# Patient Record
Sex: MALE | Race: WHITE | NOT HISPANIC OR LATINO | Employment: FULL TIME | URBAN - METROPOLITAN AREA
[De-identification: names, ages, dates, MRNs, and addresses within clinical notes are randomized per-mention and may not be internally consistent; named-entity substitution may affect disease eponyms.]

---

## 2018-03-05 ENCOUNTER — TELEPHONE (OUTPATIENT)
Dept: HISTORICAL DEPT | Facility: CLINIC | Age: 38
End: 2018-03-05

## 2018-03-12 PROBLEM — Z91.89 RISK FACTORS FOR OBSTRUCTIVE SLEEP APNEA: Status: ACTIVE | Noted: 2017-03-23

## 2018-03-12 PROBLEM — M19.90 ARTHRITIS: Status: ACTIVE | Noted: 2017-03-23

## 2018-03-12 PROBLEM — I10 ESSENTIAL HYPERTENSION: Status: ACTIVE | Noted: 2017-03-23

## 2018-03-12 PROBLEM — E88.810 METABOLIC SYNDROME: Status: ACTIVE | Noted: 2017-04-20

## 2018-03-12 PROBLEM — E78.2 MIXED HYPERLIPIDEMIA: Status: ACTIVE | Noted: 2017-04-20

## 2018-03-14 ENCOUNTER — OFFICE VISIT (OUTPATIENT)
Dept: FAMILY MEDICINE | Facility: CLINIC | Age: 38
End: 2018-03-14
Attending: FAMILY MEDICINE
Payer: COMMERCIAL

## 2018-03-14 VITALS
TEMPERATURE: 98.7 F | SYSTOLIC BLOOD PRESSURE: 130 MMHG | BODY MASS INDEX: 36.65 KG/M2 | OXYGEN SATURATION: 97 % | RESPIRATION RATE: 18 BRPM | HEART RATE: 75 BPM | DIASTOLIC BLOOD PRESSURE: 96 MMHG | HEIGHT: 70 IN | WEIGHT: 256 LBS

## 2018-03-14 DIAGNOSIS — I10 ESSENTIAL HYPERTENSION: Primary | ICD-10-CM

## 2018-03-14 DIAGNOSIS — E88.810 METABOLIC SYNDROME: ICD-10-CM

## 2018-03-14 DIAGNOSIS — J45.30 MILD PERSISTENT ASTHMATIC BRONCHITIS WITHOUT COMPLICATION: ICD-10-CM

## 2018-03-14 PROCEDURE — 99213 OFFICE O/P EST LOW 20 MIN: CPT | Performed by: FAMILY MEDICINE

## 2018-03-14 RX ORDER — OSELTAMIVIR PHOSPHATE 75 MG/1
75 CAPSULE ORAL
Refills: 0 | COMMUNITY
Start: 2018-01-22 | End: 2018-11-19 | Stop reason: ENTERED-IN-ERROR

## 2018-03-14 ASSESSMENT — ENCOUNTER SYMPTOMS
BLOOD IN STOOL: 0
FATIGUE: 0
WEAKNESS: 0
UNEXPECTED WEIGHT CHANGE: 0
APPETITE CHANGE: 0
ABDOMINAL PAIN: 0
CHEST TIGHTNESS: 0
PALPITATIONS: 0
EYE PAIN: 0
COUGH: 0
HEADACHES: 0
DYSURIA: 0
DIZZINESS: 0
DIARRHEA: 0
FLANK PAIN: 0
ACTIVITY CHANGE: 0
ARTHRALGIAS: 0
DIAPHORESIS: 0
CONSTIPATION: 0
SORE THROAT: 0
MYALGIAS: 0
ADENOPATHY: 0
NAUSEA: 0
FEVER: 0
SHORTNESS OF BREATH: 0
VOMITING: 0
EYE REDNESS: 0

## 2018-03-14 NOTE — PATIENT INSTRUCTIONS
Although  blood pressures are high today, he was out of medication for 7 days and just resumed last night.  Based on his regular home blood pressure monitoring I believe that the current Edarbychlor dosage is effective and will continue same, but advised patient that if diastolics creep up above 82 on a consistent basis we could add an additional blood pressure lowering agent to the current regimen.    Reviewed dietary guidelines and encourage additional weight loss.  Follow-up in 6 months if all goes well.  Proceed with comprehensive labs as ordered to assess triglycerides/HDL, metabolic syndrome, LFTs.    Body Mass index (BMI) is calculated from height and weight.  It is an easy method of screening for weight categories that may lead to health problems.  It is accurate for most people 20 years old and older.  The standard weight status categories are:    BMI Weight Category   Below 18.5 Underweight   18.5 - 24.9 Normal   25.0 - 29.9 Overweight   Above 30 Obese       An abnormal BMI can increase your risk for chronic illnesses.  If you already have one of these conditions, abnormal BMI can make them worse.  These conditions include: high blood pressure, diabetes, heart disease, sleep apnea, lung disease and certain cancers. Working toward a normal BMI can improve your overall health.     Your PCP can help you make a wellness plan to improve your BMI.  Eating the right foods - in the right amounts - and exercising regularly can help you achieve and maintain a normal BMI.  [facility] also offers specialized weight management services supervised by doctors.  These programs include nutrition counseling, the [facility] Program (a meal replacement program) and bariatric surgery.  Even if you don’t have a specific medical problem related to your BMI, [facility] offers free community education seminars and demonstrations in our hospitals, health centers and other convenient locations in your community.  You  can obtain more information about these programs by calling 4.717.HONP.[facility] ([phone]).    Please talk to your PCP about what treatment approach is right for you.

## 2018-03-14 NOTE — PROGRESS NOTES
"Subjective    Pt returns for BP check: outpt Bps are well controlled 120s/low 80s.(avg diastolic is 82).    He ran out of [employer] all of last week and only resumed it last night, so today's readings are up accordingly.  He remains on Fish oil 2 caps daily, low salt diet, weight is stable.    He remains on Fish oil due tpo metabolic syndrome  Patient ID: Andrew Thrasher is a 37 y.o. male.    HPI    The following have been reviewed and updated as appropriate in this visit:  Tobacco       Review of Systems   Constitutional: Negative for activity change, appetite change, diaphoresis, fatigue, fever and unexpected weight change.   HENT: Negative for congestion and sore throat.    Eyes: Negative for pain, redness and visual disturbance.   Respiratory: Negative for cough, chest tightness and shortness of breath.    Cardiovascular: Negative for chest pain and palpitations.   Gastrointestinal: Negative for abdominal pain, blood in stool, constipation, diarrhea, nausea and vomiting.   Genitourinary: Negative for dysuria and flank pain.   Musculoskeletal: Negative for arthralgias and myalgias.   Skin: Negative for rash.   Neurological: Negative for dizziness, weakness and headaches.   Hematological: Negative for adenopathy.   Psychiatric/Behavioral: Negative for behavioral problems.       Objective     Vitals:    03/14/18 1405 03/14/18 1419 03/14/18 1420   BP: 122/88 (!) 130/92 (!) 130/96   BP Location: Left forearm     Patient Position: Sitting     Pulse: 75     Resp: 18     Temp: 37.1 °C (98.7 °F)     TempSrc: Oral     SpO2: 97%     Weight: 116 kg (256 lb)     Height: 1.778 m (5' 10\")       Body mass index is 36.73 kg/m².    Physical Exam   Constitutional: He appears well-developed and well-nourished. No distress.   HENT:   Head: Normocephalic and atraumatic.   Mouth/Throat: No oropharyngeal exudate.   Otic Canals clear, Tympanic membranes intact  Eyes: HOSSEIN, EOMI, no conjunctival injection  Nose and throat clear, no " inflammation or exudate, no oral mucosal lesions  Neck supple, with intact range of motion, no mass, no lymphadenopathy, no thyromegaly, no thyroid nodule, no carotid bruit, no JVD.   Eyes: Conjunctivae and EOM are normal. Pupils are equal, round, and reactive to light.   Neck: Normal range of motion. Neck supple.   Cardiovascular: Normal rate, regular rhythm and normal heart sounds.  Exam reveals no gallop and no friction rub.    No murmur heard.  Pulmonary/Chest: Effort normal and breath sounds normal. No respiratory distress. He has no wheezes. He has no rales.   Abdominal: Soft. Bowel sounds are normal. There is no tenderness.   Musculoskeletal: He exhibits no edema.   Back no CVA tenderness    Legs: no edema, no lacy venous insufficiency changes   Lymphadenopathy:     He has no cervical adenopathy.   Skin: No rash noted.   Psychiatric: He has a normal mood and affect.       Assessment/Plan   Problem List Items Addressed This Visit     Essential hypertension - Primary      Other Visit Diagnoses     Mild persistent asthmatic bronchitis without complication

## 2018-04-18 LAB
ALBUMIN SERPL-MCNC: 4.2 G/DL (ref 3.6–5.1)
ALBUMIN/GLOB SERPL: 2.1 (CALC) (ref 1–2.5)
ALP SERPL-CCNC: 53 U/L (ref 40–115)
ALT SERPL-CCNC: 54 U/L (ref 9–46)
AST SERPL-CCNC: 22 U/L (ref 10–40)
BILIRUB SERPL-MCNC: 0.3 MG/DL (ref 0.2–1.2)
BUN SERPL-MCNC: 22 MG/DL (ref 7–25)
BUN/CREAT SERPL: ABNORMAL (CALC) (ref 6–22)
CALCIUM SERPL-MCNC: 8.8 MG/DL (ref 8.6–10.3)
CHLORIDE SERPL-SCNC: 108 MMOL/L (ref 98–110)
CHOLEST SERPL-MCNC: 197 MG/DL
CHOLEST/HDLC SERPL: 6.2 (CALC)
CO2 SERPL-SCNC: 29 MMOL/L (ref 20–31)
CREAT SERPL-MCNC: 1.07 MG/DL (ref 0.6–1.35)
GFR SERPL CREATININE-BSD FRML MDRD: 88 ML/MIN/1.73M2
GLOBULIN SER CALC-MCNC: 2 G/DL (CALC) (ref 1.9–3.7)
GLUCOSE SERPL-MCNC: 93 MG/DL (ref 65–99)
HDLC SERPL-MCNC: 32 MG/DL
LDLC SERPL CALC-MCNC: 128 MG/DL (CALC)
NONHDLC SERPL-MCNC: 165 MG/DL (CALC)
POTASSIUM SERPL-SCNC: 4.1 MMOL/L (ref 3.5–5.3)
PROT SERPL-MCNC: 6.2 G/DL (ref 6.1–8.1)
SODIUM SERPL-SCNC: 142 MMOL/L (ref 135–146)
TRIGL SERPL-MCNC: 231 MG/DL

## 2018-04-24 ENCOUNTER — TELEPHONE (OUTPATIENT)
Dept: FAMILY MEDICINE | Facility: CLINIC | Age: 38
End: 2018-04-24

## 2018-04-24 NOTE — TELEPHONE ENCOUNTER
Fwd to alcon    ----- Message from Benja Smith MD sent at 4/24/2018  7:54 AM EDT -----  Notify patient  (age 37) of lab results.  Normal glucose and kidney testing, liver enzymes have actually improved compared to last 2 lab studies with ALT down to 54 (normal is under 46 but previous readings were 81 year ago    Lipid levels are abnormal with high triglycerides at 231, borderline high LDL at 128, but dangerously low HDL at 32, less than 1 6 of the total cholesterol.  This increase increases long-term heart risk substantially.   Cut way down on starches/ in the diet (bread, pasta, pizza, p[otatoes, sweets) to get the triglycerides down and prevent future diabets.       The following measures should help bring the HDL up: Increased cardio exercise 3-6 sessions per week, increase intake of all of oil fish oil and avocado oil in the diet and consider adding 1 or 2 capsules of over-the-counter fish oil, 1000 mg, per day as a long-term supplement.  I would recommend he repeat the lipid panel in 3-6 months to see how these changes are working.

## 2018-10-15 ENCOUNTER — TRANSCRIBE ORDERS (OUTPATIENT)
Dept: SCHEDULING | Age: 38
End: 2018-10-15

## 2018-10-15 DIAGNOSIS — M51.26 OTHER INTERVERTEBRAL DISC DISPLACEMENT, LUMBAR REGION: Primary | ICD-10-CM

## 2018-10-17 ENCOUNTER — HOSPITAL ENCOUNTER (OUTPATIENT)
Dept: RADIOLOGY | Facility: HOSPITAL | Age: 38
Discharge: HOME | End: 2018-10-17
Attending: NURSE PRACTITIONER
Payer: COMMERCIAL

## 2018-10-17 DIAGNOSIS — M51.26 OTHER INTERVERTEBRAL DISC DISPLACEMENT, LUMBAR REGION: ICD-10-CM

## 2018-11-13 ENCOUNTER — TRANSCRIBE ORDERS (OUTPATIENT)
Dept: SCHEDULING | Age: 38
End: 2018-11-13

## 2018-11-13 DIAGNOSIS — M76.62 ACHILLES TENDINITIS OF LEFT LOWER EXTREMITY: Primary | ICD-10-CM

## 2018-11-14 ENCOUNTER — OFFICE VISIT (OUTPATIENT)
Dept: FAMILY MEDICINE | Facility: CLINIC | Age: 38
End: 2018-11-14
Payer: COMMERCIAL

## 2018-11-14 VITALS
HEIGHT: 70 IN | TEMPERATURE: 98.1 F | WEIGHT: 257 LBS | OXYGEN SATURATION: 97 % | SYSTOLIC BLOOD PRESSURE: 125 MMHG | BODY MASS INDEX: 36.79 KG/M2 | HEART RATE: 71 BPM | RESPIRATION RATE: 15 BRPM | DIASTOLIC BLOOD PRESSURE: 75 MMHG

## 2018-11-14 DIAGNOSIS — E88.810 METABOLIC SYNDROME: ICD-10-CM

## 2018-11-14 DIAGNOSIS — J45.30 MILD PERSISTENT ASTHMATIC BRONCHITIS WITHOUT COMPLICATION: ICD-10-CM

## 2018-11-14 DIAGNOSIS — I10 ESSENTIAL HYPERTENSION: Primary | ICD-10-CM

## 2018-11-14 PROBLEM — J45.909 ASTHMATIC BRONCHITIS: Status: ACTIVE | Noted: 2018-11-14

## 2018-11-14 PROCEDURE — 99214 OFFICE O/P EST MOD 30 MIN: CPT | Performed by: FAMILY MEDICINE

## 2018-11-14 RX ORDER — MONTELUKAST SODIUM 10 MG/1
10 TABLET ORAL NIGHTLY
Qty: 90 TABLET | Refills: 1 | Status: SHIPPED | OUTPATIENT
Start: 2018-11-14 | End: 2019-05-08 | Stop reason: SDUPTHER

## 2018-11-14 RX ORDER — ALBUTEROL SULFATE 90 UG/1
2 INHALANT RESPIRATORY (INHALATION) 3 TIMES DAILY PRN
Qty: 1 INHALER | Refills: 3 | Status: SHIPPED | OUTPATIENT
Start: 2018-11-14 | End: 2019-06-13 | Stop reason: SDUPTHER

## 2018-11-14 ASSESSMENT — ENCOUNTER SYMPTOMS
ADENOPATHY: 0
FEVER: 0
FATIGUE: 0
UNEXPECTED WEIGHT CHANGE: 0
HEADACHES: 0
VOMITING: 0
CHEST TIGHTNESS: 0
NAUSEA: 0
DYSURIA: 0
PALPITATIONS: 0
SHORTNESS OF BREATH: 0

## 2018-11-14 NOTE — PATIENT INSTRUCTIONS
Stay on same meds.    Labs soon.    Followup 6 months.    Essential hypertension  Excellent control: Continue current meds follow-up 6 months.    Asthmatic bronchitis  Stable on current management: Refilled meds.    Metabolic syndrome  We discussed his metabolic syndrome lab findings.  Stick with a low-carb diet, low-fat diet.  Reviewed guidelines in details with patient

## 2018-11-14 NOTE — PROGRESS NOTES
"Subjective      Home BPS remain well controlled  at 120s over 80- average.    He remains on a low carb diet.  Cut way down on bread/ starches.    Request an9cjfr asthma meds including prevciously unlisted Singul;air which he finds beneficial.  Escape inhaler use increases to several times weekly (not daily) in winter months.      Patient ID: Andrew Thrasher is a 38 y.o. male.  1980      HPI    The following have been reviewed and updated as appropriate in this visit:  Allergies  Meds  Problems       Review of Systems   Constitutional: Negative for fatigue, fever and unexpected weight change (NO UNEXPLAINED WEIGHT LOSS).   Respiratory: Negative for chest tightness and shortness of breath.    Cardiovascular: Negative for chest pain and palpitations.   Gastrointestinal: Negative for nausea and vomiting.   Genitourinary: Negative for dysuria.   Skin: Negative for rash.   Neurological: Negative for headaches.   Hematological: Negative for adenopathy.       Objective     Vitals:    11/14/18 0858 11/14/18 0917   BP: 120/80 125/75   BP Location: Left upper arm    Patient Position: Sitting    Pulse: 71    Resp: 15    Temp: 36.7 °C (98.1 °F)    SpO2: 97%    Weight: 117 kg (257 lb)    Height: 1.778 m (5' 10\")      Body mass index is 36.88 kg/m².    Physical Exam   Constitutional: He appears well-developed and well-nourished. No distress.   HENT:   Otic Canals clear, Tympanic membranes intact  Eyes: HOSSEIN, EOMI, no conjunctival injection  Nose and throat clear, no inflammation or exudate, no oral mucosal lesions  Neck supple, with intact range of motion, no mass, no lymphadenopathy, no thyromegaly, no thyroid nodule, no carotid bruit, no JVD.   Cardiovascular: Normal rate, regular rhythm and normal heart sounds.  Exam reveals no gallop and no friction rub.    No murmur heard.  Pulmonary/Chest: Effort normal and breath sounds normal. He has no wheezes. He has no rales.   Musculoskeletal: He exhibits no edema.   Back no " CVA tenderness    Legs: no edema, no lacy venous insufficiency changes   Skin: No rash noted.   Psychiatric: He has a normal mood and affect.       Assessment/Plan   Problem List Items Addressed This Visit     Essential hypertension - Primary     Excellent control: Continue current meds follow-up 6 months.         Relevant Medications    azilsartan med-chlorthalidone (EDARBYCLOR) 40-12.5 mg tablet    Metabolic syndrome     We discussed his metabolic syndrome lab findings.  Stick with a low-carb diet, low-fat diet.  Reviewed guidelines in details with patient         Relevant Orders    Lipid panel    ALT    Asthmatic bronchitis     Stable on current management: Refilled meds.         Relevant Medications    albuterol HFA (PROAIR HFA) 90 mcg/actuation inhaler    montelukast (SINGULAIR) 10 mg tablet

## 2018-11-15 NOTE — ASSESSMENT & PLAN NOTE
We discussed his metabolic syndrome lab findings.  Stick with a low-carb diet, low-fat diet.  Reviewed guidelines in details with patient

## 2018-11-19 ENCOUNTER — APPOINTMENT (OUTPATIENT)
Dept: LAB | Facility: HOSPITAL | Age: 38
End: 2018-11-19
Attending: ORTHOPAEDIC SURGERY
Payer: COMMERCIAL

## 2018-11-19 ENCOUNTER — APPOINTMENT (OUTPATIENT)
Dept: PREADMISSION TESTING | Facility: HOSPITAL | Age: 38
End: 2018-11-19
Attending: ORTHOPAEDIC SURGERY
Payer: COMMERCIAL

## 2018-11-19 ENCOUNTER — HOSPITAL ENCOUNTER (OUTPATIENT)
Dept: CARDIOLOGY | Facility: HOSPITAL | Age: 38
Discharge: HOME | End: 2018-11-19
Attending: ORTHOPAEDIC SURGERY
Payer: COMMERCIAL

## 2018-11-19 ENCOUNTER — TRANSCRIBE ORDERS (OUTPATIENT)
Dept: REGISTRATION | Facility: HOSPITAL | Age: 38
End: 2018-11-19

## 2018-11-19 ENCOUNTER — OFFICE VISIT (OUTPATIENT)
Dept: CARDIOLOGY | Facility: CLINIC | Age: 38
End: 2018-11-19
Payer: COMMERCIAL

## 2018-11-19 ENCOUNTER — HOSPITAL ENCOUNTER (OUTPATIENT)
Dept: RADIOLOGY | Facility: HOSPITAL | Age: 38
Discharge: HOME | End: 2018-11-19
Attending: ORTHOPAEDIC SURGERY
Payer: COMMERCIAL

## 2018-11-19 VITALS
HEART RATE: 75 BPM | DIASTOLIC BLOOD PRESSURE: 81 MMHG | BODY MASS INDEX: 34.27 KG/M2 | WEIGHT: 253 LBS | TEMPERATURE: 97.9 F | HEIGHT: 72 IN | SYSTOLIC BLOOD PRESSURE: 118 MMHG

## 2018-11-19 VITALS
BODY MASS INDEX: 34.27 KG/M2 | HEIGHT: 72 IN | DIASTOLIC BLOOD PRESSURE: 81 MMHG | WEIGHT: 253 LBS | HEART RATE: 75 BPM | SYSTOLIC BLOOD PRESSURE: 118 MMHG | OXYGEN SATURATION: 97 %

## 2018-11-19 DIAGNOSIS — M43.17 SPONDYLOLISTHESIS OF LUMBOSACRAL REGION: ICD-10-CM

## 2018-11-19 DIAGNOSIS — Z91.89 RISK FACTORS FOR OBSTRUCTIVE SLEEP APNEA: ICD-10-CM

## 2018-11-19 DIAGNOSIS — E88.810 METABOLIC SYNDROME: ICD-10-CM

## 2018-11-19 DIAGNOSIS — M54.16 RADICULOPATHY OF LUMBAR REGION: ICD-10-CM

## 2018-11-19 DIAGNOSIS — M47.26 OSTEOARTHRITIS OF SPINE WITH RADICULOPATHY, LUMBAR REGION: ICD-10-CM

## 2018-11-19 DIAGNOSIS — I10 ESSENTIAL HYPERTENSION: ICD-10-CM

## 2018-11-19 DIAGNOSIS — Z01.810 PREOP CARDIOVASCULAR EXAM: Primary | ICD-10-CM

## 2018-11-19 DIAGNOSIS — J45.20 MILD INTERMITTENT ASTHMATIC BRONCHITIS WITHOUT COMPLICATION: ICD-10-CM

## 2018-11-19 DIAGNOSIS — M43.17 SPONDYLOLISTHESIS OF LUMBOSACRAL REGION: Primary | ICD-10-CM

## 2018-11-19 PROBLEM — R94.31 ABNORMAL EKG: Status: ACTIVE | Noted: 2018-11-19

## 2018-11-19 PROBLEM — R94.31 ABNORMAL EKG: Status: RESOLVED | Noted: 2018-11-19 | Resolved: 2018-11-19

## 2018-11-19 LAB
ABO + RH BLD: NORMAL
ALBUMIN SERPL-MCNC: 5 G/DL (ref 3.4–5)
ALP SERPL-CCNC: 64 IU/L (ref 35–126)
ALT SERPL-CCNC: 62 IU/L (ref 16–63)
ANION GAP SERPL CALC-SCNC: 10 MEQ/L (ref 3–15)
APTT PPP: 33 SEC (ref 23–35)
AST SERPL-CCNC: 41 IU/L (ref 15–41)
ATRIAL RATE: 66
BASOPHILS # BLD: 0.03 K/UL (ref 0.01–0.1)
BASOPHILS NFR BLD: 0.4 %
BILIRUB SERPL-MCNC: 0.8 MG/DL (ref 0.3–1.2)
BLD GP AB SCN SERPL QL: NEGATIVE
BUN SERPL-MCNC: 26 MG/DL (ref 8–20)
CALCIUM SERPL-MCNC: 9.7 MG/DL (ref 8.9–10.3)
CHLORIDE SERPL-SCNC: 102 MEQ/L (ref 98–109)
CHOLEST SERPL-MCNC: 216 MG/DL
CO2 SERPL-SCNC: 28 MEQ/L (ref 22–32)
CREAT SERPL-MCNC: 1.1 MG/DL (ref 0.8–1.3)
D AG BLD QL: POSITIVE
DIFFERENTIAL METHOD BLD: NORMAL
EOSINOPHIL # BLD: 0.15 K/UL (ref 0.04–0.54)
EOSINOPHIL NFR BLD: 1.9 %
ERYTHROCYTE [DISTWIDTH] IN BLOOD BY AUTOMATED COUNT: 12.4 % (ref 11.6–14.4)
GFR SERPL CREATININE-BSD FRML MDRD: >60 ML/MIN/1.73M*2
GLUCOSE SERPL-MCNC: 93 MG/DL (ref 70–99)
HCT VFR BLDCO AUTO: 44.4 % (ref 40.1–51)
HDLC SERPL-MCNC: 39 MG/DL
HDLC SERPL: 5.5 {RATIO}
HGB BLD-MCNC: 15.6 G/DL (ref 13.7–17.5)
IMM GRANULOCYTES # BLD AUTO: 0.03 K/UL (ref 0–0.08)
IMM GRANULOCYTES NFR BLD AUTO: 0.4 %
INR PPP: 1 INR
LABORATORY COMMENT REPORT: NORMAL
LDLC SERPL CALC-MCNC: 132 MG/DL
LYMPHOCYTES # BLD: 2.23 K/UL (ref 1.2–3.5)
LYMPHOCYTES NFR BLD: 28.3 %
MCH RBC QN AUTO: 29.3 PG (ref 28–33.2)
MCHC RBC AUTO-ENTMCNC: 35.1 G/DL (ref 32.2–36.5)
MCV RBC AUTO: 83.3 FL (ref 83–98)
MONOCYTES # BLD: 0.57 K/UL (ref 0.3–1)
MONOCYTES NFR BLD: 7.2 %
NEUTROPHILS # BLD: 4.88 K/UL (ref 1.7–7)
NEUTS SEG NFR BLD: 61.8 %
NONHDLC SERPL-MCNC: 177 MG/DL
NRBC BLD-RTO: 0 %
P AXIS: -3
PDW BLD AUTO: 9.5 FL (ref 9.4–12.4)
PLATELET # BLD AUTO: 202 K/UL (ref 150–350)
POTASSIUM SERPL-SCNC: 4.3 MEQ/L (ref 3.6–5.1)
PR INTERVAL: 140
PROT SERPL-MCNC: 7.7 G/DL (ref 6–8.2)
PROTHROMBIN TIME: 12.6 SEC (ref 12.2–14.5)
QRS DURATION: 104
QT INTERVAL: 406
QTC CALCULATION(BAZETT): 425
R AXIS: 71
RBC # BLD AUTO: 5.33 M/UL (ref 4.5–5.8)
SODIUM SERPL-SCNC: 140 MEQ/L (ref 136–144)
T WAVE AXIS: 22
TRIGL SERPL-MCNC: 227 MG/DL (ref 30–149)
VENTRICULAR RATE: 66
WBC # BLD AUTO: 7.89 K/UL (ref 3.8–10.5)

## 2018-11-19 PROCEDURE — 85025 COMPLETE CBC W/AUTO DIFF WBC: CPT

## 2018-11-19 PROCEDURE — 99204 OFFICE O/P NEW MOD 45 MIN: CPT | Performed by: INTERNAL MEDICINE

## 2018-11-19 PROCEDURE — 85610 PROTHROMBIN TIME: CPT

## 2018-11-19 PROCEDURE — 36415 COLL VENOUS BLD VENIPUNCTURE: CPT

## 2018-11-19 PROCEDURE — 80053 COMPREHEN METABOLIC PANEL: CPT

## 2018-11-19 PROCEDURE — 80061 LIPID PANEL: CPT

## 2018-11-19 PROCEDURE — 86850 RBC ANTIBODY SCREEN: CPT

## 2018-11-19 PROCEDURE — 93005 ELECTROCARDIOGRAM TRACING: CPT

## 2018-11-19 PROCEDURE — 85730 THROMBOPLASTIN TIME PARTIAL: CPT

## 2018-11-19 PROCEDURE — 71046 X-RAY EXAM CHEST 2 VIEWS: CPT

## 2018-11-19 ASSESSMENT — ENCOUNTER SYMPTOMS
BACK PAIN: 1
FEVER: 0
ABDOMINAL PAIN: 0
VOMITING: 0
FEVER: 0
SHORTNESS OF BREATH: 0
APNEA: 0
PND: 0
DECREASED APPETITE: 0
BRUISES/BLEEDS EASILY: 0
HEMATURIA: 0
NAUSEA: 0
ENDOCRINE NEGATIVE: 1
HEMATURIA: 0
HEMATOLOGIC/LYMPHATIC NEGATIVE: 1
NEAR-SYNCOPE: 0
CHEST TIGHTNESS: 0
BACK PAIN: 1
HEARTBURN: 0
INSOMNIA: 0
DYSURIA: 0
WEAKNESS: 0
SHORTNESS OF BREATH: 0
ORTHOPNEA: 0
LIGHT-HEADEDNESS: 0
DIZZINESS: 0
WOUND: 0
HEMOPTYSIS: 0
PALPITATIONS: 0
PSYCHIATRIC NEGATIVE: 1
SNORING: 1
DYSPNEA ON EXERTION: 0
NUMBNESS: 1
SYNCOPE: 0
COUGH: 0
CHILLS: 0
PALPITATIONS: 0

## 2018-11-19 ASSESSMENT — PAIN SCALES - GENERAL: PAINLEVEL: 3

## 2018-11-19 NOTE — ASSESSMENT & PLAN NOTE
The patient is somewhat obese and is at risk for obstructive sleep apnea.  He should have special attention paid to his respiratory status during the immediate postoperative period.

## 2018-11-19 NOTE — ASSESSMENT & PLAN NOTE
The patient is for lumbar spine surgery by Dr. John Cavanaugh at [facility] on November 21, 2018.

## 2018-11-19 NOTE — ASSESSMENT & PLAN NOTE
The patient has a history of asthma since early childhood.  It is currently quiesced and well controlled on his current medications.  He will continue his current medical regimen throughout the perioperative course.

## 2018-11-19 NOTE — H&P
Chief complaint: low back pain  HPI: 39 yo male with low back pain radiating to both legs for 3 years after lifting something heavy, he had physical therapy and injections with no relief, he saw a chiropractor which gave some relief initially then stopped working, pain has been worse since August 2018, he also has numbness and burning sensation in his feet, he took Aleve or Ibuprofen for pain but stopped for surgery, MRIs x 2 show herniated discs  Allergies:   Allergies   Allergen Reactions   • No Known Allergies      Patient's Meds:   Current Outpatient Prescriptions:   •  ADVAIR DISKUS 250-50 mcg/dose diskus inhaler, INHALE 1 PUFF BY MOUTH TWICE A DAY EVERY DAY IN THE MORNING AND IN THE EVENING APPROX 12HRS APART, Disp: 1 each, Rfl: 2  •  albuterol HFA (PROAIR HFA) 90 mcg/actuation inhaler, Inhale 2 puffs 3 (three) times a day as needed for wheezing., Disp: 1 Inhaler, Rfl: 3  •  azilsartan med-chlorthalidone (EDARBYCLOR) 40-12.5 mg tablet, Take 1 tablet by mouth once daily., Disp: 90 tablet, Rfl: 1  •  montelukast (SINGULAIR) 10 mg tablet, Take 1 tablet (10 mg total) by mouth nightly., Disp: 90 tablet, Rfl: 1  Medical History:   Past Medical History:   Diagnosis Date   • Asthma    • Hypertension    • Lumbar pain with radiation down both legs    • Numbness and tingling of both feet      Surgical History:   Past Surgical History:   Procedure Laterality Date   • EYE SURGERY Right     cataract   • KNEE ARTHROSCOPY Right    • SHOULDER ARTHROSCOPY Left      Social History:   Social History     Social History   • Marital status:      Spouse name: N/A   • Number of children: N/A   • Years of education: N/A     Social History Main Topics   • Smoking status: Never Smoker   • Smokeless tobacco: Never Used   • Alcohol use Yes      Comment: rarely   • Drug use: No   • Sexual activity: Not Asked     Other Topics Concern   • None     Social History Narrative   • None     Family History:   Family History   Problem Relation  "Age of Onset   • Hyperlipidemia Mother    • Hypertension Mother    • Stroke Mother    • Hypertension Father    • Arthritis Father    • Asthma Brother    • Hypertension Paternal Grandmother    • Other Maternal Grandmother         complications of hip fracture   • Throat cancer Maternal Grandmother    • Parkinsonism Maternal Grandfather      Review of Systems   Constitutional: Negative for chills and fever.   HENT: Negative for dental problem and hearing loss.    Eyes: Negative for visual disturbance.        Glasses   Respiratory: Negative for apnea, chest tightness and shortness of breath.         Asthma- controlled by Advair, triggered by exercise and seasonal allergies  Snoring- no known observed apnea, no sleep study   Cardiovascular: Negative for chest pain, palpitations and leg swelling.   Gastrointestinal: Negative for abdominal pain, nausea and vomiting.   Endocrine: Negative.    Genitourinary: Negative for dysuria and hematuria.   Musculoskeletal: Positive for back pain (see HPI).        Left hip \"locked up\" and he limps   Skin: Negative for wound.   Neurological: Positive for numbness (feet). Negative for weakness.   Hematological: Negative.    Psychiatric/Behavioral: Negative.      Vitals:   Vitals:    11/19/18 1301   BP: 118/81   Pulse: 75   Temp: 36.6 °C (97.9 °F)     Body mass index is 34.31 kg/m².  Physical Exam   Constitutional: He is oriented to person, place, and time. He appears well-developed and well-nourished.   HENT:   Head: Normocephalic and atraumatic.   Mouth/Throat: Oropharynx is clear and moist.   Eyes: Conjunctivae are normal. Pupils are equal, round, and reactive to light.   Neck: Normal range of motion. Neck supple. No thyromegaly present.   Cardiovascular: Normal rate, regular rhythm, normal heart sounds and intact distal pulses.    No murmur heard.  +2 pedal pulses   Pulmonary/Chest: Effort normal and breath sounds normal. No respiratory distress.   Abdominal: Soft. Bowel sounds are " normal. He exhibits no mass. There is no tenderness.   Musculoskeletal: Normal range of motion. He exhibits no edema or deformity.   Lymphadenopathy:     He has no cervical adenopathy.   Neurological: He is alert and oriented to person, place, and time.   Skin: Skin is warm and dry.   Psychiatric: He has a normal mood and affect. His behavior is normal. Thought content normal.   Vitals reviewed.  labs CXR pending  EKG  Patient Active Problem List   Diagnosis   • Arthritis   • Essential hypertension   • Metabolic syndrome   • Mixed hyperlipidemia   • Risk factors for obstructive sleep apnea   • Asthmatic bronchitis         Assessment/Plan:  L5-S1 posterior lumbar decompression fusion L 4-5 decompession  Cardiac clearance  NPO for surgery, [facility]

## 2018-11-19 NOTE — PRE-PROCEDURE INSTRUCTIONS
1. We will call you between 4pm to 7pm on the date before your surgery to determine that arrival time for your procedure.   2. Please report to (the hospital tells you when they call) on the day of your procedure.   3. Please follow the following fasting guidelines:   · Nothing to eat or drink 8 hours prior to arrival   4. Early on the morning of the procedure please take your usual dose of the listed medications with a sip of water:  Advair   5. Other Instructions: complete the body wash as directed   6. If you develop a cold, cough, fever, rash, or other symptom prior to the data of the procedure, please report it to your physician immediately.   7. If you need to cancel the procedure for any reason, please contact your physician or call the unit listed above.   8. Make arrangements to have someone drive you home from the procedure. If you have not arranged for transportation home, your surgery may be cancelled.    9. You may not take public transportation unless accompanied by a responsible person.   10. You may not drive a car or operate complex or potentially dangerous machinery for 24 hours following anesthesia and/or sedation.   11. If it is medically necessary for you to have a longer stay, you will be informed as soon as the decision is made.   12. Do not wear or being anything of value to the hospital including jewelry of any kind. Do not wear make-up or contact lenses. DO bring your glasses and hearing aid.   13. Dress in comfortable clothes.   14.  If instructed, please bring a copy of your Advanced Directive (Living Will/Durable Power of ) on the day of your procedure.      Pre operative instructions given as per protocol.  Form explained by: SRUTHI Barreto     I have read and understand the above information. I have had sufficient opportunity to ask questions I might have and they have been answered to my satisfaction. I agree to comply with the Patient Responsibilities listed above  and have received a copy of this form.

## 2018-11-19 NOTE — ASSESSMENT & PLAN NOTE
The patient's blood pressure is well controlled on his current regimen.  He will not take his blood pressure medicine the morning of surgery but he will bring his medication with him, and we will be happy to manage his pressure during the hospitalization

## 2018-11-19 NOTE — PROGRESS NOTES
Pre-Operative   Consult Note         Reason for visit:   Chief Complaint   Patient presents with   • preoperative clearance       HPI     Andrew Thrasher comes to the office today for preoperative cardiac evaluation prior to L5-S1 PLDF surgery with Dr. John Cavanaugh on 11/21/18.    He has had lower back pain for 3 years after lifting a heavy object. It progressively worsened since August. He noticed that his strength has decreased. He continues to work but has had limit his activity due to increased pain. He is not using any assistive devices.      He is able to walk a block and climb a flight of stairs without having any chest pain or shortness of breath.           Outside records reviewed..    Past Medical History:   Diagnosis Date   • Asthma    • Hypertension    • Lumbar pain with radiation down both legs    • Numbness and tingling of both feet        Past Surgical History:   Procedure Laterality Date   • EYE SURGERY Right     cataract   • KNEE ARTHROSCOPY Right    • SHOULDER ARTHROSCOPY Left    • WISDOM TOOTH EXTRACTION         History   Smoking Status   • Never Smoker     Social History   Substance Use Topics   • Smoking status: Never Smoker   • Smokeless tobacco: Never Used   • Alcohol use Yes      Comment: rarely       Family History   Problem Relation Age of Onset   • Hyperlipidemia Mother    • Hypertension Mother    • Stroke Mother    • Heart attack Mother    • Hypertension Father    • Arthritis Father    • Asthma Brother    • Hypertension Paternal Grandmother    • Other Paternal Grandmother         complications from hip fracture   • Throat cancer Maternal Grandmother    • Parkinsonism Maternal Grandfather        Allergies:  No known allergies    Current Outpatient Prescriptions   Medication Sig Dispense Refill   • ADVAIR DISKUS 250-50 mcg/dose diskus inhaler INHALE 1 PUFF BY MOUTH TWICE A DAY EVERY DAY IN THE MORNING AND IN THE EVENING APPROX 12HRS APART 1 each 2   • albuterol HFA (PROAIR HFA) 90  mcg/actuation inhaler Inhale 2 puffs 3 (three) times a day as needed for wheezing. 1 Inhaler 3   • azilsartan med-chlorthalidone (EDARBYCLOR) 40-12.5 mg tablet Take 1 tablet by mouth once daily. 90 tablet 1   • montelukast (SINGULAIR) 10 mg tablet Take 1 tablet (10 mg total) by mouth nightly. 90 tablet 1     No current facility-administered medications for this visit.        Review of Systems   Constitution: Negative for decreased appetite and fever.   Cardiovascular: Negative for chest pain, dyspnea on exertion, leg swelling, near-syncope, orthopnea, palpitations, paroxysmal nocturnal dyspnea and syncope.   Respiratory: Positive for snoring. Negative for cough, hemoptysis and shortness of breath.    Hematologic/Lymphatic: Does not bruise/bleed easily.   Musculoskeletal: Positive for back pain.   Gastrointestinal: Negative for heartburn and melena.   Genitourinary: Negative for hematuria and nocturia.   Neurological: Negative for dizziness and light-headedness.   Psychiatric/Behavioral: The patient does not have insomnia.        Objective     Vitals:    11/19/18 1404   BP: 118/81   Pulse: 75   SpO2: 97%       Wt Readings from Last 1 Encounters:   11/19/18 115 kg (253 lb)       Physical Exam   Constitutional: He is oriented to person, place, and time. He appears well-nourished.   Eyes: Lids are normal.   Wears glasses   Neck: No JVD present.   Cardiovascular: Normal rate, regular rhythm and normal heart sounds.    Pulmonary/Chest: Effort normal and breath sounds normal.   Abdominal: Soft. Bowel sounds are normal.   Musculoskeletal: He exhibits no edema.   Neurological: He is alert and oriented to person, place, and time.   Skin: Skin is warm and dry.   Psychiatric: He has a normal mood and affect. His behavior is normal.        ECG 11/19/18: Normal sinus rhythm, rate 66 bpm. Normal    Labs   Lab Results   Component Value Date    WBC 7.89 11/19/2018    HGB 15.6 11/19/2018    HCT 44.4 11/19/2018     11/19/2018     ALT 62 11/19/2018    AST 41 11/19/2018     11/19/2018    K 4.3 11/19/2018     11/19/2018    CREATININE 1.1 11/19/2018    BUN 26 (H) 11/19/2018    CO2 28 11/19/2018    PT 12.6 11/19/2018       Assessment/Plan     Preop cardiovascular exam  There have been no recent episodes of angina, CHF, or clinical arrhythmias. The overall risk is intermediate but acceptable. Further testing or interventions are unlikely to improve the patient's risk. Routine perioperative care. The patient was instructed to take no medications the morning of surgery. We will resume the routine medications when oral intake is tolerated post-operatively. DVT prophylaxis according to the surgical service's protocol. We will assist you with the management of any medical problems during the hospitalization.        Osteoarthritis of spine with radiculopathy, lumbar region  The patient is for lumbar spine surgery by Dr. John Cavanaugh at [facility] on November 21, 2018.    Asthmatic bronchitis  The patient has a history of asthma since early childhood.  It is currently quiesced and well controlled on his current medications.  He will continue his current medical regimen throughout the perioperative course.    Essential hypertension  The patient's blood pressure is well controlled on his current regimen.  He will not take his blood pressure medicine the morning of surgery but he will bring his medication with him, and we will be happy to manage his pressure during the hospitalization    Risk factors for obstructive sleep apnea  The patient is somewhat obese and is at risk for obstructive sleep apnea.  He should have special attention paid to his respiratory status during the immediate postoperative period.             Luke Melton MD  11/19/2018

## 2018-11-19 NOTE — ASSESSMENT & PLAN NOTE
There have been no recent episodes of angina, CHF, or clinical arrhythmias. The overall risk is intermediate but acceptable. Further testing or interventions are unlikely to improve the patient's risk. Routine perioperative care. The patient was instructed to take no medications the morning of surgery. We will resume the routine medications when oral intake is tolerated post-operatively. DVT prophylaxis according to the surgical service's protocol. We will assist you with the management of any medical problems during the hospitalization.

## 2018-11-21 ENCOUNTER — APPOINTMENT (INPATIENT)
Dept: PHYSICAL THERAPY | Facility: HOSPITAL | Age: 38
DRG: 455 | End: 2018-11-21
Attending: NURSE PRACTITIONER
Payer: COMMERCIAL

## 2018-11-21 ENCOUNTER — APPOINTMENT (OUTPATIENT)
Dept: RADIOLOGY | Facility: HOSPITAL | Age: 38
Setting detail: SURGERY ADMIT
DRG: 455 | End: 2018-11-21
Attending: ORTHOPAEDIC SURGERY
Payer: COMMERCIAL

## 2018-11-21 ENCOUNTER — HOSPITAL ENCOUNTER (INPATIENT)
Facility: HOSPITAL | Age: 38
LOS: 3 days | Discharge: HOME | DRG: 455 | End: 2018-11-24
Attending: ORTHOPAEDIC SURGERY | Admitting: ORTHOPAEDIC SURGERY
Payer: COMMERCIAL

## 2018-11-21 ENCOUNTER — ANESTHESIA (OUTPATIENT)
Dept: OPERATING ROOM | Facility: HOSPITAL | Age: 38
Setting detail: SURGERY ADMIT
DRG: 455 | End: 2018-11-21
Payer: COMMERCIAL

## 2018-11-21 PROBLEM — Z98.1 S/P LUMBAR FUSION: Status: ACTIVE | Noted: 2018-11-21

## 2018-11-21 LAB
ABO + RH BLD: NORMAL
BLD GP AB SCN SERPL QL: NEGATIVE
D AG BLD QL: POSITIVE
LABORATORY COMMENT REPORT: NORMAL

## 2018-11-21 PROCEDURE — 63600000 HC DRUGS/DETAIL CODE: Performed by: ANESTHESIOLOGY

## 2018-11-21 PROCEDURE — 63600000 HC DRUGS/DETAIL CODE: Performed by: ORTHOPAEDIC SURGERY

## 2018-11-21 PROCEDURE — 01NB0ZZ RELEASE LUMBAR NERVE, OPEN APPROACH: ICD-10-PCS | Performed by: ORTHOPAEDIC SURGERY

## 2018-11-21 PROCEDURE — 97161 PT EVAL LOW COMPLEX 20 MIN: CPT | Mod: GP

## 2018-11-21 PROCEDURE — 72100 X-RAY EXAM L-S SPINE 2/3 VWS: CPT

## 2018-11-21 PROCEDURE — 25800000 HC PHARMACY IV SOLUTIONS: Performed by: NURSE PRACTITIONER

## 2018-11-21 PROCEDURE — 86900 BLOOD TYPING SEROLOGIC ABO: CPT

## 2018-11-21 PROCEDURE — 63700000 HC SELF-ADMINISTRABLE DRUG: Performed by: NURSE PRACTITIONER

## 2018-11-21 PROCEDURE — 20600000 HC ROOM AND CARE INTERMEDIATE/TELEMETRY

## 2018-11-21 PROCEDURE — 97116 GAIT TRAINING THERAPY: CPT | Mod: GP

## 2018-11-21 PROCEDURE — 99232 SBSQ HOSP IP/OBS MODERATE 35: CPT | Performed by: INTERNAL MEDICINE

## 2018-11-21 PROCEDURE — 0SG30AJ FUSION OF LUMBOSACRAL JOINT WITH INTERBODY FUSION DEVICE, POSTERIOR APPROACH, ANTERIOR COLUMN, OPEN APPROACH: ICD-10-PCS | Performed by: ORTHOPAEDIC SURGERY

## 2018-11-21 PROCEDURE — 63600000 HC DRUGS/DETAIL CODE: Performed by: NURSE PRACTITIONER

## 2018-11-21 PROCEDURE — 63600000 HC DRUGS/DETAIL CODE: Performed by: NURSE ANESTHETIST, CERTIFIED REGISTERED

## 2018-11-21 PROCEDURE — 72020 X-RAY EXAM OF SPINE 1 VIEW: CPT

## 2018-11-21 PROCEDURE — 36415 COLL VENOUS BLD VENIPUNCTURE: CPT | Performed by: ORTHOPAEDIC SURGERY

## 2018-11-21 PROCEDURE — 0SG3071 FUSION OF LUMBOSACRAL JOINT WITH AUTOLOGOUS TISSUE SUBSTITUTE, POSTERIOR APPROACH, POSTERIOR COLUMN, OPEN APPROACH: ICD-10-PCS | Performed by: ORTHOPAEDIC SURGERY

## 2018-11-21 PROCEDURE — 36000015 HC OR LEVEL 5 EA ADDL MIN: Performed by: ORTHOPAEDIC SURGERY

## 2018-11-21 PROCEDURE — 71000001 HC PACU PHASE 1 INITIAL 30MIN: Performed by: ORTHOPAEDIC SURGERY

## 2018-11-21 PROCEDURE — 27200000 HC STERILE SUPPLY: Performed by: ORTHOPAEDIC SURGERY

## 2018-11-21 PROCEDURE — 25000000 HC PHARMACY GENERAL: Performed by: NURSE ANESTHETIST, CERTIFIED REGISTERED

## 2018-11-21 PROCEDURE — C1713 ANCHOR/SCREW BN/BN,TIS/BN: HCPCS | Performed by: ORTHOPAEDIC SURGERY

## 2018-11-21 PROCEDURE — 36000014 HC OR LEVEL 4 EA ADDL MIN: Performed by: ORTHOPAEDIC SURGERY

## 2018-11-21 PROCEDURE — 27800000 HC SUPPLY/IMPLANTS: Performed by: ORTHOPAEDIC SURGERY

## 2018-11-21 PROCEDURE — 36000004 HC OR LEVEL 4 INITIAL 30MIN: Performed by: ORTHOPAEDIC SURGERY

## 2018-11-21 PROCEDURE — 36000005 HC OR LEVEL 5 INITIAL 30MIN: Performed by: ORTHOPAEDIC SURGERY

## 2018-11-21 PROCEDURE — 71000011 HC PACU PHASE 1 EA ADDL MIN: Performed by: ORTHOPAEDIC SURGERY

## 2018-11-21 PROCEDURE — 25000000 HC PHARMACY GENERAL: Performed by: ORTHOPAEDIC SURGERY

## 2018-11-21 PROCEDURE — 37000001 HC ANESTHESIA GENERAL: Performed by: ORTHOPAEDIC SURGERY

## 2018-11-21 DEVICE — IMPLANTABLE DEVICE: Type: IMPLANTABLE DEVICE | Status: FUNCTIONAL

## 2018-11-21 DEVICE — SCREW EXPEDIUM 7.0 X 45MM: Type: IMPLANTABLE DEVICE | Status: FUNCTIONAL

## 2018-11-21 DEVICE — OSTEOSPONGE 3DEMIN 50X25MM: Type: IMPLANTABLE DEVICE | Status: FUNCTIONAL

## 2018-11-21 DEVICE — ROD EXPEDIUM 40MM: Type: IMPLANTABLE DEVICE | Status: FUNCTIONAL

## 2018-11-21 DEVICE — ROD EXPEDIUM 35MM: Type: IMPLANTABLE DEVICE | Status: FUNCTIONAL

## 2018-11-21 DEVICE — SET SCREWS EXPEDIUM: Type: IMPLANTABLE DEVICE | Status: FUNCTIONAL

## 2018-11-21 DEVICE — BONE CHIPS 30CC FINE FILLER: Type: IMPLANTABLE DEVICE | Status: FUNCTIONAL

## 2018-11-21 RX ORDER — DIPHENHYDRAMINE HCL 50 MG/ML
12.5 VIAL (ML) INJECTION
Status: DISCONTINUED | OUTPATIENT
Start: 2018-11-21 | End: 2018-11-21

## 2018-11-21 RX ORDER — APREPITANT 40 MG/1
40 CAPSULE ORAL
Status: COMPLETED | OUTPATIENT
Start: 2018-11-21 | End: 2018-11-21

## 2018-11-21 RX ORDER — FENTANYL CITRATE 50 UG/ML
50 INJECTION, SOLUTION INTRAMUSCULAR; INTRAVENOUS
Status: DISCONTINUED | OUTPATIENT
Start: 2018-11-21 | End: 2018-11-21

## 2018-11-21 RX ORDER — ONDANSETRON HYDROCHLORIDE 2 MG/ML
4 INJECTION, SOLUTION INTRAVENOUS EVERY 8 HOURS PRN
Status: DISCONTINUED | OUTPATIENT
Start: 2018-11-21 | End: 2018-11-24 | Stop reason: HOSPADM

## 2018-11-21 RX ORDER — LIDOCAINE HCL/PF 100 MG/5ML
SYRINGE (ML) INTRAVENOUS AS NEEDED
Status: DISCONTINUED | OUTPATIENT
Start: 2018-11-21 | End: 2018-11-21 | Stop reason: SURG

## 2018-11-21 RX ORDER — ROCURONIUM BROMIDE 10 MG/ML
INJECTION, SOLUTION INTRAVENOUS AS NEEDED
Status: DISCONTINUED | OUTPATIENT
Start: 2018-11-21 | End: 2018-11-21 | Stop reason: SURG

## 2018-11-21 RX ORDER — AMOXICILLIN 250 MG
1 CAPSULE ORAL 2 TIMES DAILY
Status: DISCONTINUED | OUTPATIENT
Start: 2018-11-22 | End: 2018-11-24 | Stop reason: HOSPADM

## 2018-11-21 RX ORDER — SODIUM CHLORIDE, SODIUM LACTATE, POTASSIUM CHLORIDE, CALCIUM CHLORIDE 600; 310; 30; 20 MG/100ML; MG/100ML; MG/100ML; MG/100ML
INJECTION, SOLUTION INTRAVENOUS CONTINUOUS
Status: DISCONTINUED | OUTPATIENT
Start: 2018-11-21 | End: 2018-11-21

## 2018-11-21 RX ORDER — MONTELUKAST SODIUM 10 MG/1
10 TABLET ORAL NIGHTLY
Status: DISCONTINUED | OUTPATIENT
Start: 2018-11-21 | End: 2018-11-24 | Stop reason: HOSPADM

## 2018-11-21 RX ORDER — BISACODYL 10 MG/1
10 SUPPOSITORY RECTAL DAILY PRN
Status: DISCONTINUED | OUTPATIENT
Start: 2018-11-21 | End: 2018-11-24 | Stop reason: HOSPADM

## 2018-11-21 RX ORDER — FENTANYL CITRATE 50 UG/ML
INJECTION, SOLUTION INTRAMUSCULAR; INTRAVENOUS AS NEEDED
Status: DISCONTINUED | OUTPATIENT
Start: 2018-11-21 | End: 2018-11-21 | Stop reason: SURG

## 2018-11-21 RX ORDER — SODIUM CHLORIDE 9 MG/ML
125 INJECTION, SOLUTION INTRAVENOUS CONTINUOUS
Status: DISCONTINUED | OUTPATIENT
Start: 2018-11-21 | End: 2018-11-22

## 2018-11-21 RX ORDER — MIDAZOLAM HYDROCHLORIDE 2 MG/2ML
INJECTION, SOLUTION INTRAMUSCULAR; INTRAVENOUS AS NEEDED
Status: DISCONTINUED | OUTPATIENT
Start: 2018-11-21 | End: 2018-11-21 | Stop reason: SURG

## 2018-11-21 RX ORDER — DEXAMETHASONE SODIUM PHOSPHATE 4 MG/ML
INJECTION, SOLUTION INTRA-ARTICULAR; INTRALESIONAL; INTRAMUSCULAR; INTRAVENOUS; SOFT TISSUE AS NEEDED
Status: DISCONTINUED | OUTPATIENT
Start: 2018-11-21 | End: 2018-11-21 | Stop reason: SURG

## 2018-11-21 RX ORDER — SODIUM CHLORIDE, SODIUM LACTATE, POTASSIUM CHLORIDE, CALCIUM CHLORIDE 600; 310; 30; 20 MG/100ML; MG/100ML; MG/100ML; MG/100ML
INJECTION, SOLUTION INTRAVENOUS CONTINUOUS
Status: DISCONTINUED | OUTPATIENT
Start: 2018-11-21 | End: 2018-11-21 | Stop reason: HOSPADM

## 2018-11-21 RX ORDER — HYDROMORPHONE HYDROCHLORIDE 1 MG/ML
INJECTION, SOLUTION INTRAMUSCULAR; INTRAVENOUS; SUBCUTANEOUS AS NEEDED
Status: DISCONTINUED | OUTPATIENT
Start: 2018-11-21 | End: 2018-11-21 | Stop reason: SURG

## 2018-11-21 RX ORDER — CEFAZOLIN SODIUM 2 G/50ML
2 SOLUTION INTRAVENOUS
Status: DISCONTINUED | OUTPATIENT
Start: 2018-11-21 | End: 2018-11-24 | Stop reason: HOSPADM

## 2018-11-21 RX ORDER — ONDANSETRON HYDROCHLORIDE 2 MG/ML
INJECTION, SOLUTION INTRAVENOUS AS NEEDED
Status: DISCONTINUED | OUTPATIENT
Start: 2018-11-21 | End: 2018-11-21 | Stop reason: SURG

## 2018-11-21 RX ORDER — ONDANSETRON HYDROCHLORIDE 2 MG/ML
4 INJECTION, SOLUTION INTRAVENOUS
Status: DISCONTINUED | OUTPATIENT
Start: 2018-11-21 | End: 2018-11-21

## 2018-11-21 RX ORDER — CEFAZOLIN SODIUM 2 G/50ML
2 SOLUTION INTRAVENOUS
Status: DISCONTINUED | OUTPATIENT
Start: 2018-11-21 | End: 2018-11-21

## 2018-11-21 RX ORDER — DIAZEPAM 5 MG/1
5 TABLET ORAL EVERY 6 HOURS PRN
Status: DISCONTINUED | OUTPATIENT
Start: 2018-11-21 | End: 2018-11-24 | Stop reason: HOSPADM

## 2018-11-21 RX ORDER — ACETAMINOPHEN 325 MG/1
650 TABLET ORAL EVERY 4 HOURS PRN
Status: DISCONTINUED | OUTPATIENT
Start: 2018-11-21 | End: 2018-11-24 | Stop reason: HOSPADM

## 2018-11-21 RX ORDER — PROPOFOL 10 MG/ML
INJECTION, EMULSION INTRAVENOUS AS NEEDED
Status: DISCONTINUED | OUTPATIENT
Start: 2018-11-21 | End: 2018-11-21 | Stop reason: SURG

## 2018-11-21 RX ORDER — OXYCODONE HYDROCHLORIDE 5 MG/1
10 TABLET ORAL EVERY 4 HOURS PRN
Status: DISCONTINUED | OUTPATIENT
Start: 2018-11-21 | End: 2018-11-24 | Stop reason: HOSPADM

## 2018-11-21 RX ORDER — HYDROMORPHONE HYDROCHLORIDE 1 MG/ML
0.5 INJECTION, SOLUTION INTRAMUSCULAR; INTRAVENOUS; SUBCUTANEOUS
Status: DISCONTINUED | OUTPATIENT
Start: 2018-11-21 | End: 2018-11-21

## 2018-11-21 RX ORDER — ALUMINUM HYDROXIDE, MAGNESIUM HYDROXIDE, AND SIMETHICONE 1200; 120; 1200 MG/30ML; MG/30ML; MG/30ML
30 SUSPENSION ORAL EVERY 4 HOURS PRN
Status: DISCONTINUED | OUTPATIENT
Start: 2018-11-21 | End: 2018-11-24 | Stop reason: HOSPADM

## 2018-11-21 RX ORDER — PHENYLEPHRINE HYDROCHLORIDE 10 MG/ML
INJECTION INTRAVENOUS AS NEEDED
Status: DISCONTINUED | OUTPATIENT
Start: 2018-11-21 | End: 2018-11-21 | Stop reason: SURG

## 2018-11-21 RX ORDER — EPHEDRINE SULFATE 50 MG/ML
INJECTION, SOLUTION INTRAVENOUS AS NEEDED
Status: DISCONTINUED | OUTPATIENT
Start: 2018-11-21 | End: 2018-11-21 | Stop reason: SURG

## 2018-11-21 RX ORDER — OXYCODONE HYDROCHLORIDE 5 MG/1
5 TABLET ORAL EVERY 4 HOURS PRN
Status: DISCONTINUED | OUTPATIENT
Start: 2018-11-21 | End: 2018-11-24 | Stop reason: HOSPADM

## 2018-11-21 RX ORDER — VANCOMYCIN HYDROCHLORIDE 1 G/20ML
INJECTION, POWDER, LYOPHILIZED, FOR SOLUTION INTRAVENOUS AS NEEDED
Status: DISCONTINUED | OUTPATIENT
Start: 2018-11-21 | End: 2018-11-21 | Stop reason: HOSPADM

## 2018-11-21 RX ADMIN — DIAZEPAM 5 MG: 5 TABLET ORAL at 12:25

## 2018-11-21 RX ADMIN — FENTANYL CITRATE 150 MCG: 50 INJECTION, SOLUTION INTRAMUSCULAR; INTRAVENOUS at 08:45

## 2018-11-21 RX ADMIN — FENTANYL CITRATE 50 MCG: 50 INJECTION, SOLUTION INTRAMUSCULAR; INTRAVENOUS at 08:33

## 2018-11-21 RX ADMIN — FENTANYL CITRATE 50 MCG: 50 INJECTION, SOLUTION INTRAMUSCULAR; INTRAVENOUS at 11:56

## 2018-11-21 RX ADMIN — ROCURONIUM BROMIDE 10 MG: 10 INJECTION, SOLUTION INTRAVENOUS at 09:07

## 2018-11-21 RX ADMIN — PROPOFOL 200 MG: 10 INJECTION, EMULSION INTRAVENOUS at 08:45

## 2018-11-21 RX ADMIN — FENTANYL CITRATE 100 MCG: 50 INJECTION, SOLUTION INTRAMUSCULAR; INTRAVENOUS at 08:55

## 2018-11-21 RX ADMIN — EPHEDRINE SULFATE 10 MG: 50 INJECTION, SOLUTION INTRAVENOUS at 09:15

## 2018-11-21 RX ADMIN — ROCURONIUM BROMIDE 40 MG: 10 INJECTION, SOLUTION INTRAVENOUS at 09:25

## 2018-11-21 RX ADMIN — APREPITANT 40 MG: 40 CAPSULE ORAL at 07:44

## 2018-11-21 RX ADMIN — SUGAMMADEX 453.6 MG: 100 INJECTION, SOLUTION INTRAVENOUS at 10:21

## 2018-11-21 RX ADMIN — SODIUM CHLORIDE, SODIUM LACTATE, POTASSIUM CHLORIDE, CALCIUM CHLORIDE: 600; 310; 30; 20 INJECTION, SOLUTION INTRAVENOUS at 07:59

## 2018-11-21 RX ADMIN — PHENYLEPHRINE HYDROCHLORIDE 100 MCG: 10 INJECTION INTRAVENOUS at 09:04

## 2018-11-21 RX ADMIN — MONTELUKAST SODIUM 10 MG: 10 TABLET, FILM COATED ORAL at 21:09

## 2018-11-21 RX ADMIN — ROCURONIUM BROMIDE 20 MG: 10 INJECTION, SOLUTION INTRAVENOUS at 09:15

## 2018-11-21 RX ADMIN — CEFAZOLIN SODIUM 2 G: 2 SOLUTION INTRAVENOUS at 17:12

## 2018-11-21 RX ADMIN — FENTANYL CITRATE 100 MCG: 50 INJECTION, SOLUTION INTRAMUSCULAR; INTRAVENOUS at 09:10

## 2018-11-21 RX ADMIN — HYDROMORPHONE HYDROCHLORIDE 0.5 MG: 1 INJECTION, SOLUTION INTRAMUSCULAR; INTRAVENOUS; SUBCUTANEOUS at 12:21

## 2018-11-21 RX ADMIN — ROCURONIUM BROMIDE 50 MG: 10 INJECTION, SOLUTION INTRAVENOUS at 08:46

## 2018-11-21 RX ADMIN — SODIUM CHLORIDE 125 ML/HR: 9 INJECTION, SOLUTION INTRAVENOUS at 17:14

## 2018-11-21 RX ADMIN — CEFAZOLIN SODIUM 2 G: 2 SOLUTION INTRAVENOUS at 08:55

## 2018-11-21 RX ADMIN — SODIUM CHLORIDE, SODIUM LACTATE, POTASSIUM CHLORIDE, CALCIUM CHLORIDE: 600; 310; 30; 20 INJECTION, SOLUTION INTRAVENOUS at 09:25

## 2018-11-21 RX ADMIN — Medication 1 MG: at 10:06

## 2018-11-21 RX ADMIN — DEXAMETHASONE SODIUM PHOSPHATE 10 MG: 4 INJECTION, SOLUTION INTRA-ARTICULAR; INTRALESIONAL; INTRAMUSCULAR; INTRAVENOUS; SOFT TISSUE at 09:11

## 2018-11-21 RX ADMIN — MIDAZOLAM HYDROCHLORIDE 2 MG: 1 INJECTION, SOLUTION INTRAMUSCULAR; INTRAVENOUS at 08:33

## 2018-11-21 RX ADMIN — LIDOCAINE HYDROCHLORIDE 5 ML: 20 INJECTION, SOLUTION INTRAVENOUS at 08:44

## 2018-11-21 RX ADMIN — SODIUM CHLORIDE, SODIUM LACTATE, POTASSIUM CHLORIDE, CALCIUM CHLORIDE: 600; 310; 30; 20 INJECTION, SOLUTION INTRAVENOUS at 10:15

## 2018-11-21 RX ADMIN — EPHEDRINE SULFATE 15 MG: 50 INJECTION, SOLUTION INTRAVENOUS at 10:23

## 2018-11-21 RX ADMIN — FENTANYL CITRATE 100 MCG: 50 INJECTION, SOLUTION INTRAMUSCULAR; INTRAVENOUS at 09:19

## 2018-11-21 RX ADMIN — EPHEDRINE SULFATE 10 MG: 50 INJECTION, SOLUTION INTRAVENOUS at 10:02

## 2018-11-21 RX ADMIN — VANCOMYCIN HYDROCHLORIDE 1000 MG: 1 INJECTION, POWDER, LYOPHILIZED, FOR SOLUTION INTRAVENOUS at 07:59

## 2018-11-21 RX ADMIN — ONDANSETRON 4 MG: 2 INJECTION INTRAMUSCULAR; INTRAVENOUS at 09:11

## 2018-11-21 RX ADMIN — Medication: at 12:34

## 2018-11-21 RX ADMIN — PROPOFOL 50 MG: 10 INJECTION, EMULSION INTRAVENOUS at 08:47

## 2018-11-21 RX ADMIN — ROCURONIUM BROMIDE 40 MG: 10 INJECTION, SOLUTION INTRAVENOUS at 09:33

## 2018-11-21 RX ADMIN — FENTANYL CITRATE 50 MCG: 50 INJECTION, SOLUTION INTRAMUSCULAR; INTRAVENOUS at 11:41

## 2018-11-21 ASSESSMENT — COGNITIVE AND FUNCTIONAL STATUS - GENERAL
WALKING IN HOSPITAL ROOM: 3 - A LITTLE
HELP NEEDED FOR BATHING: 3 - A LITTLE
CLIMB 3 TO 5 STEPS WITH RAILING: 3 - A LITTLE
WALKING IN HOSPITAL ROOM: 3 - A LITTLE
HELP NEEDED FOR PERSONAL GROOMING: 3 - A LITTLE
CLIMB 3 TO 5 STEPS WITH RAILING: 3 - A LITTLE
EATING MEALS: 4 - NONE
STANDING UP FROM CHAIR USING ARMS: 3 - A LITTLE
STANDING UP FROM CHAIR USING ARMS: 3 - A LITTLE
TOILETING: 3 - A LITTLE
DRESSING REGULAR UPPER BODY CLOTHING: 4 - NONE
MOVING TO AND FROM BED TO CHAIR: 3 - A LITTLE
DRESSING REGULAR LOWER BODY CLOTHING: 3 - A LITTLE
MOVING TO AND FROM BED TO CHAIR: 3 - A LITTLE

## 2018-11-21 NOTE — H&P (VIEW-ONLY)
Pre-Operative   Consult Note         Reason for visit:   Chief Complaint   Patient presents with   • preoperative clearance       HPI     Andrew Thrasher comes to the office today for preoperative cardiac evaluation prior to L5-S1 PLDF surgery with Dr. John Cavanaugh on 11/21/18.    He has had lower back pain for 3 years after lifting a heavy object. It progressively worsened since August. He noticed that his strength has decreased. He continues to work but has had limit his activity due to increased pain. He is not using any assistive devices.      He is able to walk a block and climb a flight of stairs without having any chest pain or shortness of breath.           Outside records reviewed..    Past Medical History:   Diagnosis Date   • Asthma    • Hypertension    • Lumbar pain with radiation down both legs    • Numbness and tingling of both feet        Past Surgical History:   Procedure Laterality Date   • EYE SURGERY Right     cataract   • KNEE ARTHROSCOPY Right    • SHOULDER ARTHROSCOPY Left    • WISDOM TOOTH EXTRACTION         History   Smoking Status   • Never Smoker     Social History   Substance Use Topics   • Smoking status: Never Smoker   • Smokeless tobacco: Never Used   • Alcohol use Yes      Comment: rarely       Family History   Problem Relation Age of Onset   • Hyperlipidemia Mother    • Hypertension Mother    • Stroke Mother    • Heart attack Mother    • Hypertension Father    • Arthritis Father    • Asthma Brother    • Hypertension Paternal Grandmother    • Other Paternal Grandmother         complications from hip fracture   • Throat cancer Maternal Grandmother    • Parkinsonism Maternal Grandfather        Allergies:  No known allergies    Current Outpatient Prescriptions   Medication Sig Dispense Refill   • ADVAIR DISKUS 250-50 mcg/dose diskus inhaler INHALE 1 PUFF BY MOUTH TWICE A DAY EVERY DAY IN THE MORNING AND IN THE EVENING APPROX 12HRS APART 1 each 2   • albuterol HFA (PROAIR HFA) 90  mcg/actuation inhaler Inhale 2 puffs 3 (three) times a day as needed for wheezing. 1 Inhaler 3   • azilsartan med-chlorthalidone (EDARBYCLOR) 40-12.5 mg tablet Take 1 tablet by mouth once daily. 90 tablet 1   • montelukast (SINGULAIR) 10 mg tablet Take 1 tablet (10 mg total) by mouth nightly. 90 tablet 1     No current facility-administered medications for this visit.        Review of Systems   Constitution: Negative for decreased appetite and fever.   Cardiovascular: Negative for chest pain, dyspnea on exertion, leg swelling, near-syncope, orthopnea, palpitations, paroxysmal nocturnal dyspnea and syncope.   Respiratory: Positive for snoring. Negative for cough, hemoptysis and shortness of breath.    Hematologic/Lymphatic: Does not bruise/bleed easily.   Musculoskeletal: Positive for back pain.   Gastrointestinal: Negative for heartburn and melena.   Genitourinary: Negative for hematuria and nocturia.   Neurological: Negative for dizziness and light-headedness.   Psychiatric/Behavioral: The patient does not have insomnia.        Objective     Vitals:    11/19/18 1404   BP: 118/81   Pulse: 75   SpO2: 97%       Wt Readings from Last 1 Encounters:   11/19/18 115 kg (253 lb)       Physical Exam   Constitutional: He is oriented to person, place, and time. He appears well-nourished.   Eyes: Lids are normal.   Wears glasses   Neck: No JVD present.   Cardiovascular: Normal rate, regular rhythm and normal heart sounds.    Pulmonary/Chest: Effort normal and breath sounds normal.   Abdominal: Soft. Bowel sounds are normal.   Musculoskeletal: He exhibits no edema.   Neurological: He is alert and oriented to person, place, and time.   Skin: Skin is warm and dry.   Psychiatric: He has a normal mood and affect. His behavior is normal.        ECG 11/19/18: Normal sinus rhythm, rate 66 bpm. Normal    Labs   Lab Results   Component Value Date    WBC 7.89 11/19/2018    HGB 15.6 11/19/2018    HCT 44.4 11/19/2018     11/19/2018     ALT 62 11/19/2018    AST 41 11/19/2018     11/19/2018    K 4.3 11/19/2018     11/19/2018    CREATININE 1.1 11/19/2018    BUN 26 (H) 11/19/2018    CO2 28 11/19/2018    PT 12.6 11/19/2018       Assessment/Plan     Preop cardiovascular exam  There have been no recent episodes of angina, CHF, or clinical arrhythmias. The overall risk is intermediate but acceptable. Further testing or interventions are unlikely to improve the patient's risk. Routine perioperative care. The patient was instructed to take no medications the morning of surgery. We will resume the routine medications when oral intake is tolerated post-operatively. DVT prophylaxis according to the surgical service's protocol. We will assist you with the management of any medical problems during the hospitalization.        Osteoarthritis of spine with radiculopathy, lumbar region  The patient is for lumbar spine surgery by Dr. John Cavanaugh at [facility] on November 21, 2018.    Asthmatic bronchitis  The patient has a history of asthma since early childhood.  It is currently quiesced and well controlled on his current medications.  He will continue his current medical regimen throughout the perioperative course.    Essential hypertension  The patient's blood pressure is well controlled on his current regimen.  He will not take his blood pressure medicine the morning of surgery but he will bring his medication with him, and we will be happy to manage his pressure during the hospitalization    Risk factors for obstructive sleep apnea  The patient is somewhat obese and is at risk for obstructive sleep apnea.  He should have special attention paid to his respiratory status during the immediate postoperative period.             Luke Melton MD  11/19/2018

## 2018-11-21 NOTE — PLAN OF CARE
Problem: Patient Care Overview  Goal: Plan of Care Review  Outcome: Ongoing (interventions implemented as appropriate)   11/21/18 9762   Coping/Psychosocial   Plan Of Care Reviewed With patient   Plan of Care Review   Progress no change

## 2018-11-21 NOTE — ASSESSMENT & PLAN NOTE
- Azilsartan/Chlorthalidone not available on formulary - resume upon d/c as BP requires  - Na level improving  - Monitor BMP  - K is better, follow with PCP

## 2018-11-21 NOTE — OR SURGEON
Pre-Procedure patient identification:  I am the primary operating surgeon/proceduralist and I have identified the patient on 11/21/18 at 7:48 AM John Cavanaugh MD  Phone Number: [phone]

## 2018-11-21 NOTE — ANESTHESIA PREPROCEDURE EVALUATION
Anesthesia ROS/MED HX    Anesthesia History    Previous anesthetics  No history of anesthetic complications  Pulmonary    asthma  Cardiovascular   hypertension   ECG reviewed  Comments: T  Endo/Other  Body Habitus: Obese    EKG: Normal sinus rhythm  Normal ECG  No previous ECGs available  Confirmed by NANNETTE ARSHAD MD ([id number]) on 11/19/2018 3:54:35 PM  .  Patient Active Problem List   Diagnosis   • Arthritis   • Essential hypertension   • Metabolic syndrome   • Mixed hyperlipidemia   • Risk factors for obstructive sleep apnea   • Asthmatic bronchitis   • Preop cardiovascular exam   • Osteoarthritis of spine with radiculopathy, lumbar region      Past Medical History:   Diagnosis Date   • Asthma    • Hypertension    • Lumbar pain with radiation down both legs    • Numbness and tingling of both feet      Past Surgical History:   Procedure Laterality Date   • EYE SURGERY Right     cataract   • KNEE ARTHROSCOPY Right    • SHOULDER ARTHROSCOPY Left    • WISDOM TOOTH EXTRACTION       Prior to Admission medications    Medication Sig Start Date End Date Taking? Authorizing Provider   ADVAIR DISKUS 250-50 mcg/dose diskus inhaler INHALE 1 PUFF BY MOUTH TWICE A DAY EVERY DAY IN THE MORNING AND IN THE EVENING APPROX 12HRS APART 8/16/18  Yes Michelle Miranda CRNP   albuterol HFA (PROAIR HFA) 90 mcg/actuation inhaler Inhale 2 puffs 3 (three) times a day as needed for wheezing. 11/14/18  Yes Benja Smith MD   azilsartan med-chlorthalidone (EDARBYCLOR) 40-12.5 mg tablet Take 1 tablet by mouth once daily. 11/14/18 5/13/19 Yes Benja Smith MD   montelukast (SINGULAIR) 10 mg tablet Take 1 tablet (10 mg total) by mouth nightly. 11/14/18 5/13/19 Yes Benja Smith MD       CBC Results       11/19/18 03/27/17                       1343           WBC 7.89 6.5          RBC 5.33 5.58          HGB 15.6 15.9          HCT 44.4 45.3          MCV 83.3 81          MCH 29.3 28.5          MCHC 35.1 35.1           153                          BMP Results       11/19/18 04/17/18 03/27/17                    1343 0650           142 140         K 4.3 4.1 4.3         Cl 102 108 99         CO2 28 29 22         Glucose 93 93 93         BUN 26 (H) - 21 (H)         Creatinine 1.1 1.07 0.93         Calcium 9.7 8.8 -         Anion Gap 10 - -         EGFR &gt;60.0 88 105           102 122            Results from last 7 days  Lab Units 11/19/18  1343   INR INR 1.0   PTT sec 33     Physical Exam    Airway   Mallampati: III   TM distance: >3 FB   Neck ROM: full  Cardiovascular    Rhythm: regular   Rate: normal  Pulmonary - normal   clear to auscultation  Other Findings   T  Dental - normal    Anesthesia  Exam Comments:   Exam Airway: Thick neck       Anesthesia Plan    Plan: general    Technique: general endotracheal     Lines and Monitors: PIV     Airway: direct visual laryngoscopy and oral intubation   ASA 2  Blood Products:     Use of Blood Products Discussed: Yes     Consented to blood products  Anesthetic plan and risks discussed with: patient  Postop Plan:   Patient Disposition: inpatient floor planned admission   Pain Management: IV analgesics

## 2018-11-21 NOTE — ASSESSMENT & PLAN NOTE
- DVT prophylaxis and pain management per ortho service  - Pulmonary toilet  - Ambulate  - Bowel regimen  - Hold all natural supplements. Resume post op when ok to do so per ortho     Stable from medical standpoint to d/c, pending clearance from PT and ortho.

## 2018-11-21 NOTE — ANESTHESIA PROCEDURE NOTES
Airway  Urgency: elective    Start Time: 11/21/2018 8:49 AM    General Information and Staff    Patient location during procedure: OR  Anesthesiologist: CHRISSY VAZQUEZ  Resident/CRNA: JOVI JAMES    Indications and Patient Condition  Indications for airway management: anesthesia  Sedation level: deep  Preoxygenated: yes  Patient position: sniffing      Final Airway Details  Final airway type: endotracheal airway      Successful airway: ETT    Successful intubation technique: direct laryngoscopy  Facilitating devices/methods: intubating stylet  Endotracheal tube insertion site: oral  Blade: Jose  Blade size: #4  ETT size: 8.0 mm  Cormack-Lehane Classification: grade IIb - view of arytenoids or posterior of glottis only  Placement verified by: chest auscultation and capnometry   Measured from: lips  ETT to lips (cm): 23  Number of attempts at approach: 1

## 2018-11-21 NOTE — DISCHARGE INSTRUCTIONS
DISCHARGE INSTRUCTIONS:  LUMBAR FUSION  ANTONINA CAVANAUGH MD    [place]  Please make sure your incision is checked at least twice daily for signs and symptoms of infection. If any of the following should occur, please call the office: draining of incisional site, opening of incision,  fevers greater than 101.5 (low grade fevers post op are normal),  flu-like symptoms,  increased redness and/or tenderness around the incision    The incisional sutures will be removed 2 weeks following your surgery at your office follow-up visit.    SHOWERING / DRESSING  Change the gauze dressing over your incision every day and keep the incision  covered with dry sterile gauze and tape.You may stop covering the incision with gauze five days after your surgery, as long as the incision site is not leaking or draining fluid.    You may shower starting five days after your surgery (if incision is dry and intact). After showering, gently dry the incision site.    Hair washing is permissible while in the shower. No tub baths, hot tubs or whirlpools  until seen in the office and ok'd by Dr Cavanaugh. Do not rub cream or lotion on the incision.    EXERCISE  Only lift objects weighing less than 10-15 lbs.Do not bend or twist at the waist. Always bend your knees!!    Limit your sitting to 20-30 minute intervals. You should lie down or walk in between  sitting periods. There are no limitations for sitting in a recliner chair.    Walk as much as possible-let discomfort be your guide.  You may also go up and down  stairs as much as you can tolerate. Walking outside (as long as it is nice weather) or walking on a treadmill is permitted (no incline).    PAIN  Pain is expected after surgery. Take your pain medication as directed. As your pain  level decreases, you may begin to take over-the­ counter Extra Strength Tylenol  (3000mg/day maximum).    DO NOT take any anti-inflammatories (like Motrin, Ibuprofen, Advil, Aleve, Celebrex,  etc) for 12 weeks after  surgery. Taking anti-inflammatories can interfere with fusion  Healing.    Do not resume taking Fosamax or Actonel for 12 weeks after your fusion surgery.    Avoid nicotine exposure for at least 12 weeks after surgery including smoking, the patch, nicotine gum and second hand smoke.        BLOODCLOTS  Some swelling if normal post operatively. If you notice swelling in one or both of your  legs that is painful and/or hot to the touch and/or you have shortness of breath - please  give us a call.    CONSTIPATION  It is normal to be constipated after surgery due to anesthesia and narcotics. Stay  hydrated, eat fiber, get up and walk and use the Colace that is prescribed. You can also  use warm prune juice and get over the counter Miralax, suppositories, enemas and  Magnesium Citrate if constipation persists.    DRIVING  You may not drive a car until told otherwise by your physician. You may be a passenger  for short distances (20-30 minutes).    If you must take a longer trip make sure to make several stops so that you can walk  around and stretch your legs. Reclining the passenger seat seems to be the most  comfortable position for most patients.    FOLLOW-UP APPOINTMENTS  Your first post-op visit will be with Kate Hernandez DNP, ANGELI, ONP-C. This  appointment was made for you when you signed up for surgery. If you do not have a  post op appointment, please call to make one.    If you have any additional questions/concerns please contact Kate Hernandez DNP,  ANGELI, ONP-C., or PRASANTH Zamorano at [phone].

## 2018-11-21 NOTE — ASSESSMENT & PLAN NOTE
- Dyslipidemia, HTN, BMI 34. Glucose appears controlled upon review of labs  .  - BP management as above  - Defer statin initiation to outpatient primary care setting  - Lifestyle modification  - EtC02 monitoring per protocol   - Monitor for signs or symptoms of hypercapnea   - Aggressive pulmonary toilet  - GERD/aspiration precautions

## 2018-11-21 NOTE — PROGRESS NOTES
Pt without new complaints, doing well  AFVSS THIERNO in place  5/5 motor BLE all muscle groups  SILT BLE all sens dist  Dressing dry    A/P Stable post op  -pt, oob  -pain control  -abx/drain  -scds  -med management

## 2018-11-21 NOTE — PROGRESS NOTES
Patient without complaints    VSS; AAO; NAD  Postop dsg CDI, THIERNO intact  BLLE 5/5 motor and SILT  SCDs intact    AP/ Per Dr Cavanaugh:  - admit to med surg unit  - PT/OT per protocol  - dvt prophylax: scds when in bed, mobilize as tolerated  - medical management per [facility]  - see orders

## 2018-11-21 NOTE — PLAN OF CARE
Problem: Patient Care Overview  Goal: Plan of Care Review  Outcome: Ongoing (interventions implemented as appropriate)   11/21/18 6856   Coping/Psychosocial   Plan Of Care Reviewed With patient   Plan of Care Review   Progress progress toward functional goals as expected   Outcome Summary Pt cl S/min A with mobility, will benefit from PT while in the hospital.       Problem: Acute Therapy Services Goal & Intervention Plan  Goal: Bed Mobility Goal  Outcome: Ongoing (interventions implemented as appropriate)    Goal: Gait Training Goal  Outcome: Ongoing (interventions implemented as appropriate)    Goal: Stairs Goal  Outcome: Ongoing (interventions implemented as appropriate)    Goal: Transfer Training Goal  Outcome: Ongoing (interventions implemented as appropriate)

## 2018-11-21 NOTE — ANESTHESIA POSTPROCEDURE EVALUATION
Patient: Andrew Thrasher    Procedure Summary     Date:  11/21/18 Room / Location:   OR 5 / [facility] OR    Anesthesia Start:  0836 Anesthesia Stop:  1126    Procedure:  L5-S1 Posterior Lumbar Decompression Posterior Interbody Fusion Instrumentation Cage Allograft/Autograft L4-5 Decompression (N/A ) Diagnosis:       Acquired spondylolisthesis of lumbosacral region      Lumbar radiculitis      Spinal stenosis of lumbar region without neurogenic claudication      (Spondylolisthesis Neuritis Radiculitis Lumbosacral stenosis)    Surgeon:  John Cavanaugh MD Responsible Provider:  Hira Varma MD    Anesthesia Type:  general ASA Status:  2          Anesthesia Type: general  PACU Vitals  11/21/2018 1117 - 11/21/2018 1141      11/21/2018 1125             BP: 133/76    Temp: 36.2 °C (97.1 °F)    Pulse: (!)  113    Resp: (!)  11    SpO2: 95 %            Anesthesia Post Evaluation    Pain management: adequate  Mode of pain management: IV medication  Patient location during evaluation: PACU  Patient participation: complete - patient participated  Level of consciousness: awake  Cardiovascular status: acceptable  Airway Patency: adequate  Respiratory status: acceptable  Hydration status: acceptable  Anesthetic complications: no

## 2018-11-21 NOTE — PROGRESS NOTES
Inpatient Cardiology   Daily Progress Note       Overview:    - S/P L5- S1 PLDF on 18     Assessment/Plan   Essential hypertension   Assessment & Plan    - Azilsartan/Chlorthalidone not available on formulary.   .  - Hold for now. Resume post op as BP requires, would be able to use his home supply when indicated.        Metabolic syndrome   Assessment & Plan    - Dyslipidemia, HTN, BMI 34. Glucose appears controlled upon review of labs  .  - BP management as above  - Defer statin initiation to outpatient primary care setting  - Lifestyle modification  - EtC02 monitoring per protocol   - Monitor for signs or symptoms of hypercapnea   - Aggressive pulmonary toilet  - GERD/aspiration precautions           * S/P lumbar fusion   Overview    - S/P L5- S1 PLDF on 18. EBL 300cc     Assessment & Plan    - DVT prophylaxis and pain management per ortho service  - Pulmonary toilet  - Ambulate  - Bowel regimen  - Hold all natural supplements. Resume post op when ok to do so per ortho                 Subjective   Pain controlled when seen in PACU. No cp, dyspnea, nausea, dizziness otherwise ROS neg. Has inhalers with him.       Current Medications:    Scheduled:    ceFAZolin in dextrose (iso-os) 2 g intravenous 30 min Pre-Op       Infusions:    lactated ringer's     lactated ringer's  Last Rate: 125 mL/hr at 18 0759   morphine in 0.9 % NaCl         PRN:  diazePAM  •  diphenhydrAMINE  •  fentaNYL  •  HYDROmorphone  •  morphine in 0.9 % NaCl **AND** naloxone (NARCAN) IV syringe 0.04 mg/mL **AND** Vital Signs, Pulse Oximetry, Sedation Score, and End Tidal CO2 (not required for Comfort Care patients) **AND** End Tidal CO2 Monitoring **AND** Notify physician (specify):Systolic blood pressure less than: 100; Respiratory rate less than: 6 **AND** [] For PCA discontinuation: reassess patient and document within a minimum of 1 hour (refer to Patient Controlled Analgesia Policy) **AND** PCA Nursing  Documentation  •  ondansetron **OR** promethazine     Objective   Vital signs in last 24 hours:  Temp:  [36.2 °C (97.1 °F)-36.6 °C (97.8 °F)] 36.2 °C (97.1 °F)  Heart Rate:  [] 66  Resp:  [6-20] 6  BP: (126-146)/(74-90) 131/74    Wt Readings from Last 3 Encounters:   11/21/18 113 kg (250 lb)   11/19/18 115 kg (253 lb)   11/19/18 115 kg (253 lb)       Physical Exam   Constitutional: He appears well-developed and well-nourished.   obese   HENT:   Head: Normocephalic.   Eyes: EOM are normal.   Neck: No JVD present.   Cardiovascular: Normal rate and regular rhythm.  Exam reveals no friction rub.    No murmur heard.  + pedal pulses   Pulmonary/Chest: Effort normal. He has no wheezes. He has no rales.   diminished   Abdominal: Soft. Bowel sounds are normal.   Genitourinary:   Genitourinary Comments: + Chandler    Musculoskeletal: He exhibits no edema.   Neurological:   Drowsy but arousable   Skin: Skin is warm and dry. He is not diaphoretic.   Lumbar drsg not visualized  THIERNO drain bloody    Psychiatric: He has a normal mood and affect.          Labs and Data:      Hematology  Lab Results   Component Value Date    WBC 7.89 11/19/2018    HGB 15.6 11/19/2018    HCT 44.4 11/19/2018     11/19/2018    INR 1.0 11/19/2018       Chemistries  Lab Results   Component Value Date     11/19/2018    K 4.3 11/19/2018     11/19/2018    CREATININE 1.1 11/19/2018    BUN 26 (H) 11/19/2018    CO2 28 11/19/2018    CALCIUM 9.7 11/19/2018    ALT 62 11/19/2018    AST 41 11/19/2018       Biomarkers  No results found for: CKTOTAL, TROPONINI, BNP    Cholesterol  Lab Results   Component Value Date    CHOL 216 (H) 11/19/2018    TRIG 227 (H) 11/19/2018    HDL 39 (L) 11/19/2018    LDLCALC 132 (H) 11/19/2018    NONHDLCALC 177 11/19/2018       Endocrine  No results found for: TSH, FREET4, HGBA1C   Radiology:  I have independently reviewed the pertinent imaging from the last 24 hrs.    X-ray Chest 2 Views    Result Date:  11/19/2018  IMPRESSION: No acute disease.    X-ray Spine 1 View    Result Date: 11/21/2018  IMPRESSION: Intraoperative radiograph showing localizer posteriorly at the level of L5.    X-ray Lumbar Spine 2 Or 3 Views    Result Date: 11/21/2018  IMPRESSION: Postoperative changes related to lumbar fusion at L5-S1.       Cardiology:    Telemetry  sinus rhythm                SRUTHI Pope  11/21/2018

## 2018-11-22 ENCOUNTER — APPOINTMENT (INPATIENT)
Dept: PHYSICAL THERAPY | Facility: HOSPITAL | Age: 38
DRG: 455 | End: 2018-11-22
Payer: COMMERCIAL

## 2018-11-22 LAB
ANION GAP SERPL CALC-SCNC: 10 MEQ/L (ref 3–15)
BUN SERPL-MCNC: 28 MG/DL (ref 8–20)
CALCIUM SERPL-MCNC: 8.4 MG/DL (ref 8.9–10.3)
CHLORIDE SERPL-SCNC: 98 MEQ/L (ref 98–109)
CO2 SERPL-SCNC: 24 MEQ/L (ref 22–32)
CREAT SERPL-MCNC: 1.1 MG/DL (ref 0.8–1.3)
ERYTHROCYTE [DISTWIDTH] IN BLOOD BY AUTOMATED COUNT: 12.7 % (ref 11.6–14.4)
GFR SERPL CREATININE-BSD FRML MDRD: >60 ML/MIN/1.73M*2
GLUCOSE SERPL-MCNC: 121 MG/DL (ref 70–99)
HCT VFR BLDCO AUTO: 34.6 % (ref 40.1–51)
HGB BLD-MCNC: 12.4 G/DL (ref 13.7–17.5)
MCH RBC QN AUTO: 29.9 PG (ref 28–33.2)
MCHC RBC AUTO-ENTMCNC: 35.8 G/DL (ref 32.2–36.5)
MCV RBC AUTO: 83.4 FL (ref 83–98)
PDW BLD AUTO: 10.1 FL (ref 9.4–12.4)
PLATELET # BLD AUTO: 191 K/UL (ref 150–350)
POTASSIUM SERPL-SCNC: 4.3 MEQ/L (ref 3.6–5.1)
RBC # BLD AUTO: 4.15 M/UL (ref 4.5–5.8)
SODIUM SERPL-SCNC: 132 MEQ/L (ref 136–144)
WBC # BLD AUTO: 14.88 K/UL (ref 3.8–10.5)

## 2018-11-22 PROCEDURE — 63700000 HC SELF-ADMINISTRABLE DRUG: Performed by: NURSE PRACTITIONER

## 2018-11-22 PROCEDURE — 99232 SBSQ HOSP IP/OBS MODERATE 35: CPT | Performed by: INTERNAL MEDICINE

## 2018-11-22 PROCEDURE — 63600000 HC DRUGS/DETAIL CODE: Performed by: NURSE PRACTITIONER

## 2018-11-22 PROCEDURE — 20600000 HC ROOM AND CARE INTERMEDIATE/TELEMETRY

## 2018-11-22 PROCEDURE — 97165 OT EVAL LOW COMPLEX 30 MIN: CPT | Mod: GO

## 2018-11-22 PROCEDURE — 97116 GAIT TRAINING THERAPY: CPT | Mod: GP | Performed by: PHYSICAL THERAPIST

## 2018-11-22 PROCEDURE — 97530 THERAPEUTIC ACTIVITIES: CPT | Mod: GP | Performed by: PHYSICAL THERAPIST

## 2018-11-22 PROCEDURE — 85027 COMPLETE CBC AUTOMATED: CPT | Performed by: NURSE PRACTITIONER

## 2018-11-22 PROCEDURE — 80048 BASIC METABOLIC PNL TOTAL CA: CPT | Performed by: NURSE PRACTITIONER

## 2018-11-22 RX ADMIN — OXYCODONE HYDROCHLORIDE 10 MG: 5 TABLET ORAL at 19:52

## 2018-11-22 RX ADMIN — DIAZEPAM 5 MG: 5 TABLET ORAL at 17:29

## 2018-11-22 RX ADMIN — CEFAZOLIN SODIUM 2 G: 2 SOLUTION INTRAVENOUS at 08:35

## 2018-11-22 RX ADMIN — CEFAZOLIN SODIUM 2 G: 2 SOLUTION INTRAVENOUS at 02:32

## 2018-11-22 RX ADMIN — SENNOSIDES AND DOCUSATE SODIUM 1 TABLET: 8.6; 5 TABLET ORAL at 19:52

## 2018-11-22 RX ADMIN — SENNOSIDES AND DOCUSATE SODIUM 1 TABLET: 8.6; 5 TABLET ORAL at 08:35

## 2018-11-22 RX ADMIN — OXYCODONE HYDROCHLORIDE 5 MG: 5 TABLET ORAL at 06:11

## 2018-11-22 RX ADMIN — MONTELUKAST SODIUM 10 MG: 10 TABLET, FILM COATED ORAL at 21:59

## 2018-11-22 RX ADMIN — CEFAZOLIN SODIUM 2 G: 2 SOLUTION INTRAVENOUS at 17:30

## 2018-11-22 RX ADMIN — OXYCODONE HYDROCHLORIDE 10 MG: 5 TABLET ORAL at 16:00

## 2018-11-22 RX ADMIN — OXYCODONE HYDROCHLORIDE 10 MG: 5 TABLET ORAL at 11:29

## 2018-11-22 ASSESSMENT — COGNITIVE AND FUNCTIONAL STATUS - GENERAL
HELP NEEDED FOR BATHING: 3 - A LITTLE
CLIMB 3 TO 5 STEPS WITH RAILING: 4 - NONE
MOVING TO AND FROM BED TO CHAIR: 4 - NONE
WALKING IN HOSPITAL ROOM: 4 - NONE
DRESSING REGULAR UPPER BODY CLOTHING: 4 - NONE
EATING MEALS: 4 - NONE
STANDING UP FROM CHAIR USING ARMS: 4 - NONE
DRESSING REGULAR LOWER BODY CLOTHING: 3 - A LITTLE
TOILETING: 4 - NONE
HELP NEEDED FOR PERSONAL GROOMING: 4 - NONE

## 2018-11-22 NOTE — PLAN OF CARE
Problem: Patient Care Overview  Goal: Plan of Care Review  Outcome: Ongoing (interventions implemented as appropriate)   11/22/18 0523   Coping/Psychosocial   Plan Of Care Reviewed With patient   Plan of Care Review   Progress improving   Outcome Summary Pt comfortable; pain well managed. Pt ambulates w/ supervision. Pt slept well through night.       Problem: Laminectomy/Foraminotomy/Discectomy (Adult)  Goal: Signs and Symptoms of Listed Potential Problems Will be Absent, Minimized or Managed (Laminectomy/Foraminotomy/Discectomy)  Outcome: Ongoing (interventions implemented as appropriate)    Goal: Anesthesia/Sedation Recovery  Outcome: Outcome(s) Achieved Date Met: 11/22/18

## 2018-11-22 NOTE — CONSULTS
Received consult for case management.       Met with patient and his mother at bedside.  Patient lives with spouse and children in a ranch style home with 2-3 steps to enter.        Patient is independent with ADL's.       DME: none; is able to borrow a cane, walker and commode if needed.      Anticipate discharge to home. No skilled needs identified at this time. Cleared by PT.      Will continue to follow

## 2018-11-22 NOTE — PLAN OF CARE
Problem: Patient Care Overview  Goal: Discharge Needs Assessment   11/22/18 1201   DC Needs Assessment   Concerns To Be Addressed no discharge needs identified   Readmission Within The Last 30 Days no previous admission in last 30 days   Anticipated Discharge Disposition home without services   Equipment Needed After Discharge none   Activity/Self Care ROS   Equipment Currently Used at Home none  (can borrow a cane, walker, commode)

## 2018-11-22 NOTE — OP NOTE
REPORT TYPE:  Operative Note    DATE OF OPERATION:  11/21/2018      PREOPERATIVE DIAGNOSES:  Right side L4-L5 and bilateral L5-S1 stenosis with radiculopathy.    POSTOPERATIVE DIAGNOSES:  Right side L4-L5 and bilateral L5-S1 stenosis with radiculopathy.    PROCEDURE:  Right side L4-L5 and bilateral L5-S1 laminectomy, left side L5-S1 facetectomy, partial right side L5-S1 facetectomy, posterior spinal fusion and transforaminal lumbar interbody fusion L5-S1, use of posterior nonsegmental instrumentation, use   of interbody cage, use of local autograft bone, crushed cancellous Allograft bone and Bacterin bone matrix.    SURGEON:  John Cavanaugh MD    ASSISTANT:  Violette Romo.    ANESTHESIA:  General endotracheal anesthesia.    COMPLICATIONS:  None.    INSTRUMENTATION:  DePuy Expedium screws and [place] cage.    SPECIMENS:  None.    INDICATIONS:  For complete discussion of indications of procedure as well as discussion I had with the patient in my office regarding risks, benefits, alternative to treatment, please refer to my office notes and the consent form, which was signed.    DESCRIPTION OF PROCEDURE:  Patient was identified in the holding area.  Operative site was marked.  Patient was taken to the operating room.  Neurophysiologic monitor leads were applied.  General anesthesia administered.  Patient was then prepped and   draped in a prone position on Ronaldo table.  All bony and soft tissue protuberances were well padded throughout the duration of procedure.  Throughout the procedure, the patient had SCDs on his bilateral lower extremities.  Prior to skin incision,   intravenous antibiotics were administered.  A formal timeout was performed.  At the end of procedure, sponge and needle counts were correct x2.    After prepping and draping, an incision was made posteriorly with L4-S1 levels in the midline.  Dissection was carried down through subcutaneous tissue down to the level of the deep fascia.  Deep fascia  incised with electrocautery in subperiosteal   fashion.  Posterior elements on the right side L4-L5, bilateral L5-S1 were exposed.  Intraoperative x-ray was taken to confirm level.  Following this, soft tissue retractors were placed.  A large rongeur was then used to remove the spinous processes of   L5 and S1, a small amount of spinous process of L4.  A large rongeur was then used to thin the lamina at these levels.  A 3 mm Kerrison rongeur was used to remove ligamentum flavum and perform a laminectomy, removing the superior 1/2 of the S1 lamina,   the entire L5 lamina and the inferior portion of the right-side L4 lamina.  Lateral recess decompression was performed using a 3 mm Kerrison rongeur, removing ligamentum flavum and performing a partial medial facetectomy on the right side at L4-L5 and   L5-S1 and on the left side L5-S1.  There is severe foraminal narrowing on the left side L5-S1.  Foraminotomies were performed on bilateral L5-S1 on the right side at L4-L5.  Osteotome and a mallet was used to osteotomize to the left L5 pars.  The left L5   pars and inferior articular process of L5 and the left side was removed using a large rongeur.  A 15 blade scalpel was used to sharply incise the annulus and the left side L5-S1 through the annulotomy.  The L5-S1 interspace was completely debrided free   of all disk and cartilaginous material back to bleeding endplate bone using a series of curved and straight curettes, pituitary rongeurs, and endplate pietro.  The interspace was irrigated and inspected to ensure debris and bony endplates were exposed.    Local autograft bone from laminectomy was morcellized, combined with crushed cancellous allograft bone and Bacterin bone matrix.  This bone graft combination was impacted using a tamp and mallet in the L5-S1 interspace.  Additional amounts of this bone   graft combination were placed within DePuy [place] cage of appropriate size.  The cage was impacted using tamp and  mallet in the L5-S1 interspace.  Secure fit was noted at the cage.  Attention was turned to the instrumentation.  The pedicles were   identified within the spinal canal bilaterally at L5 and S1.  A high speed bur was used to make starting point for the pedicle screws followed by advancement of pedicle probe through the isthmus of the pedicle into the vertebral bodies to an appropriate   depth.  The holes were tapped and felt with a ball tip probe to ensure there were no breaches.  Following this, screws of appropriate width and length were placed bilaterally at L5 and S1.  Secure fixation was noted with each screw purchase.  All screws   were tested well with EMG without a medial breach.  Following this, bilateral transverse processes at L5, bilateral sacral ala and the right side L5-S1 facet joints were decorticated using a high-speed bur.  The remainder of the bone graft combination   was packed over the decorticated bony surfaces bilaterally at L5-S1.  Precut and contoured rods were placed in the screws bilaterally at L5-S1.  Caps were placed over the rods into the screws and finally tightened into place according to the   's recommendation.  Intraoperative AP and lateral x-rays were taken which showed adequate positioning of instrumentation and interbody cage.  The adequacy of decompression was confirmed with a Carlos, which was passed out the foramen along   lateral recess in the right side of L4-L5 and bilaterally at L5-S1.  There is no remaining stenosis or compression noted.  The exiting traversing nerves in the right side L4-L5 and bilateral L5-S1 were fully decompressed.  Hemostasis was obtained using   electrocautery, bipolar and FloSeal.  On a Valsalva maneuver, there was no significant bleeding noted and no evidence of any spinal fluid leakage.  The wound was copiously irrigated with antibiotic-impregnated solution.  Hemostasis was felt to be   adequate.  A drain was placed deep to the deep  fascia.  Deep fascia was closed in interrupted fashion.  Subcutaneous tissue was closed in interrupted fashion.  Skin was closed in running fashion followed by dry dressing and tape.  The patient was turned   supine on the hospital bed, extubated and transferred to the PACU in stable condition.  There were no complications.      ANTONINA SY MD        CC:     DD: 11/22/2018 06:42  DT: 11/22/2018 09:09  Voice ID: [id number]/Report ID: [id number]  [email or web address]

## 2018-11-22 NOTE — PROGRESS NOTES
Patient: Andrew Thrasher  Location: [address]  MRN: [id number]  Today's date: 11/22/2018  Pt returned to room by transport        Therapy Pain/Vitals - 11/22/18 1000        Pain/Comfort/Sleep    Pain Charting Type Pain Assessment    Presence of Pain denies       Vital Signs    Pulse 66    SpO2 98 %    /61          Prior Living Environment  Lives With: spouse, child(bisi), dependent  Living Arrangements: house  Home Accessibility: stairs to enter home  Living Environment Comment: 1SH, 2 PRINCESS with B railing Equipment Currently Used at Home: none       Prior Level of Function  Ambulation: independent  Transferring: independent  Toileting: independent  Bathing: independent  Dressing: independent  Eating: independent  Communication: understands/communicates without difficulty  Swallowing: swallows foods/liquids without difficulty  Equipment Currently Used at Home: none           PT Treatment Summary - 11/22/18 1000        Session Details    Document Type daily treatment    Mode of Treatment individual therapy;physical therapy       Time Calculation    Start Time 0615    Stop Time 0645    Time Calculation (min) 30 min       General Information    Patient Profile Reviewed? yes    Onset of Illness/Injury or Date of Surgery 11/21/18    Referring Physician Mao    Pertinent History of Current Functional Problem PLDF    Existing Precautions/Restrictions fall       Coping Strategies    Trust Relationship/Rapport care explained;questions answered       Orientation Log    City 3-->spontaneous/free recall    Kind of Place 3-->spontaneous/free recall    Name of Hospital 3-->spontaneous/free recall    Month 3-->spontaneous/free recall    Date 3-->spontaneous/free recall    Year 3-->spontaneous/free recall    Day of Week 3-->spontaneous/free recall    Clock Time 3-->spontaneous/free recall    Etiology/Event 3-->spontaneous/free recall    Pathology Deficits 3-->spontaneous/free recall    Total Score 30        Bed Mobility    Bed Mobility bed mobility activities    [place], All Bed Mobility Activities independent       Sit to Stand Transfer    [place], Sit to Stand Transfer independent       Stand to Sit Transfer    [place], Stand to Sit Transfer independent       Gait Training    [place], Gait independent    Distance in Feet 300 feet    Gait Pattern Utilized step-through       Stairs Training    [place], Stairs independent    Assistive Device railing    Handrail Location right side (descending)    Number of Stairs 12    Ascending Stairs Technique step-to-step    Descending Stairs Technique step-to-step    Comment car transfer indep       AM-PAC (TM) - Mobility (Current Function)    Turning from your back to your side while in a flat bed without using bedrails? 4 - None    Moving from lying on your back to sitting on the side of a flat bed without using bedrails? 4 - None    Moving to and from a bed to a chair? 4 - None    Standing up from a chair using your arms? 4 - None    To walk in a hospital room? 4 - None    Climbing 3-5 steps with a railing? 4 - None    AM-PAC (TM) Mobility Score 24       PT Clinical Impression    Plan For Care Reviewed: Physical Therapy PT plan for care discussed with patient       SLP Clinical Impression    Daily Outcome Statement indep for fxn mobility, cleared PT          Pain Assessment/Intervention  Pain Charting Type: Pain Assessment             Education provided this session. See the Patient Education summary report for full details.    PT Care Plan Goals      Most Recent Value   Stair Goal, PT   PT STG: Stairs  modified independence   PT STG: Number of Stairs  4   PT STG Duration: Stairs  3 days or less   PT STG Outcome: Stairs  goal met      PT Care Plan Goals      Most Recent Value   Bed Mobility Goal   Date Goal Established: Bed Mobility  11/21/18   Time to Achieve Goal: Bed Mobility  2 days   Goal Activity: Bed Mobility  all bed mobility activities   Level of  [place] Goal: Bed Mobility  independent   Goal Outcome: Bed Mobility  goal met   Gait Goal   Date Goal Established: Gait Training  11/21/18   Time to Achieve Goal: Gait Training  2 days   Level of [place]  independent   Distance Goal: Gait Training (feet)  200 feet   Goal Outcome: Gait Training  goal met   Transfer Goal   Date Goal Established: Transfer Training  11/21/18   Time to Achieve Goal: Transfer Training  2 days   Goal Activity: Transfer Training  all transfers   Level of [place] Goal: Transfer Training  modified independence   Goal Outcome: Transfer Training  goal met

## 2018-11-22 NOTE — PLAN OF CARE
Problem: Patient Care Overview  Goal: Plan of Care Review  Outcome: Ongoing (interventions implemented as appropriate)   11/22/18 1018   Coping/Psychosocial   Plan Of Care Reviewed With patient   Plan of Care Review   Progress progress toward functional goals as expected   Outcome Summary indep for fxn mobility       Problem: Acute Therapy Services Goal & Intervention Plan  Goal: Bed Mobility Goal  Outcome: Outcome(s) Achieved Date Met: 11/22/18    Goal: Gait Training Goal  Outcome: Outcome(s) Achieved Date Met: 11/22/18    Goal: Stairs Goal  Outcome: Outcome(s) Achieved Date Met: 11/22/18    Goal: Transfer Training Goal  Outcome: Outcome(s) Achieved Date Met: 11/22/18

## 2018-11-22 NOTE — PROGRESS NOTES
Inpatient Cardiology   Daily Progress Note       Overview:    - S/P L5- S1 PLDF on 11/21/18     Assessment/Plan   Metabolic syndrome   Assessment & Plan    - Dyslipidemia, HTN, BMI 34. Glucose appears controlled upon review of labs  .  - BP management as above  - Defer statin initiation to outpatient primary care setting  - Lifestyle modification  - EtC02 monitoring per protocol   - Monitor for signs or symptoms of hypercapnea   - Aggressive pulmonary toilet  - GERD/aspiration precautions           Essential hypertension   Assessment & Plan    - Azilsartan/Chlorthalidone not available on formulary.   .  - Hold for now. Resume post op as BP requires, would be able to use his home supply when indicated.  - Na level 132 from 140, patient drinking large amounts of fluids - I counseled patient to minimize intake of fluids  - Monitor BMP        * S/P lumbar fusion   Overview    - S/P L5- S1 PLDF on 11/21/18. EBL 300cc     Assessment & Plan    - DVT prophylaxis and pain management per ortho service  - Pulmonary toilet  - Ambulate  - Bowel regimen  - Hold all natural supplements. Resume post op when ok to do so per ortho                 Subjective   Denies any CP, SOB, dizziness; otherwise negative ROS.      Current Medications:    Scheduled:    ceFAZolin 2 g intravenous q8h INT   montelukast 10 mg oral Nightly   NON FORMULARY REQUEST 250 mcg inhalation BID   sennosides-docusate sodium 1 tablet oral BID       Infusions:       PRN:  •  acetaminophen  •  alum-mag hydroxide-simeth  •  bisacodyl  •  diazePAM  •  NON FORMULARY REQUEST  •  ondansetron  •  oxyCODONE  •  oxyCODONE     Objective   Vital signs in last 24 hours:  Temp:  [36.1 °C (97 °F)-36.9 °C (98.5 °F)] 36.7 °C (98 °F)  Heart Rate:  [] 73  Resp:  [6-18] 16  BP: (108-146)/() 131/74    Wt Readings from Last 3 Encounters:   11/21/18 113 kg (250 lb)   11/19/18 115 kg (253 lb)   11/19/18 115 kg (253 lb)       Physical Exam   Constitutional: He is  oriented to person, place, and time. He appears well-developed and well-nourished.   obese   HENT:   Head: Normocephalic.   Eyes: EOM are normal.   Neck: No JVD present.   Cardiovascular: Normal rate and regular rhythm.  Exam reveals no friction rub.    No murmur heard.  + pedal pulses   Pulmonary/Chest: Effort normal. He has no wheezes. He has no rales.   Abdominal: Soft. Bowel sounds are normal.   Musculoskeletal: He exhibits no edema.   Neurological: He is alert and oriented to person, place, and time.   Skin: Skin is warm and dry. He is not diaphoretic.   Psychiatric: He has a normal mood and affect.          Labs and Data:      Hematology  Lab Results   Component Value Date    WBC 14.88 (H) 11/22/2018    HGB 12.4 (L) 11/22/2018    HCT 34.6 (L) 11/22/2018     11/22/2018    INR 1.0 11/19/2018       Chemistries  Lab Results   Component Value Date     (L) 11/22/2018    K 4.3 11/22/2018    CL 98 11/22/2018    CREATININE 1.1 11/22/2018    BUN 28 (H) 11/22/2018    CO2 24 11/22/2018    CALCIUM 8.4 (L) 11/22/2018    ALT 62 11/19/2018    AST 41 11/19/2018       Biomarkers  No results found for: CKTOTAL, TROPONINI, BNP    Cholesterol  Lab Results   Component Value Date    CHOL 216 (H) 11/19/2018    TRIG 227 (H) 11/19/2018    HDL 39 (L) 11/19/2018    LDLCALC 132 (H) 11/19/2018    NONHDLCALC 177 11/19/2018       Endocrine  No results found for: TSH, FREET4, HGBA1C   Radiology:  I have independently reviewed the pertinent imaging from the last 24 hrs.    X-ray Chest 2 Views    Result Date: 11/19/2018  IMPRESSION: No acute disease.    X-ray Spine 1 View    Result Date: 11/21/2018  IMPRESSION: Intraoperative radiograph showing localizer posteriorly at the level of L5.    X-ray Lumbar Spine 2 Or 3 Views    Result Date: 11/21/2018  IMPRESSION: Postoperative changes related to lumbar fusion at L5-S1.                 Efrain Gonzáles MD  11/22/2018

## 2018-11-22 NOTE — PROGRESS NOTES
Patient: Andrew Thrasher  Location: [address]  MRN: [id number]  Today's date: 11/22/2018    Pt received and left in chair on sheet and incontinence pad.  Needs in place and call bell in reach.  RN aware.          Therapy Pain/Vitals - 11/22/18 1000        Pain/Comfort/Sleep    Pain Charting Type Pain Assessment    Presence of Pain denies       Vital Signs    Pulse 66    SpO2 98 %    /61          Prior Living Environment  Lives With: child(bisi), dependent  Living Arrangements: house  Home Accessibility: stairs to enter home  Living Environment Comment: 1SH, 2STE, tub/shower Equipment Currently Used at Home: none       Prior Level of Function  Ambulation: independent  Transferring: independent  Toileting: independent  Bathing: independent  Dressing: independent  Eating: independent  Communication: understands/communicates without difficulty  Swallowing: swallows foods/liquids without difficulty  Equipment Currently Used at Home: none           OT Evaluation - 11/22/18 1008        Session Details    Document Type initial evaluation    Mode of Treatment occupational therapy       Time Calculation    Start Time 1000    Stop Time 1020    Time Calculation (min) 20 min       General Information    Patient Profile Reviewed? yes    Onset of Illness/Injury or Date of Surgery 11/21/18    Referring Physician Dr. Cavanaugh    Pertinent History of Current Functional Problem 38 y.o. male s/p PLDF    Existing Precautions/Restrictions fall;spinal       Coping Strategies    Trust Relationship/Rapport care explained       Orientation Log    City 3-->spontaneous/free recall    Kind of Place 3-->spontaneous/free recall    Name of [facility] 3-->spontaneous/free recall    Month 3-->spontaneous/free recall    Date 3-->spontaneous/free recall    Year 3-->spontaneous/free recall    Day of Week 3-->spontaneous/free recall    Clock Time 3-->spontaneous/free recall    Etiology/Event 3-->spontaneous/free recall    Pathology  Deficits 3-->spontaneous/free recall    Total Score 30       Cognition/Psychosocial    Safety Awareness intact       Attention    Behavioral Observations WFL       Sensory    Sensory General Assessment no sensation deficits identified   BUEs WFL       Range of Motion (ROM)    General Range of Motion no range of motion deficits identified    Comment, General Range of Motion BUEs WFL       Manual Muscle Testing (MMT)    General MMT Assessment no strength deficits identified    Comment BUEs WFL       Sit to Stand Transfer    [place], Sit to Stand Transfer independent    Verbal Cues safety;technique       Stand to Sit Transfer    [place], Stand to Sit Transfer independent       Toilet Transfer    [place], Toilet Transfer modified independence    Assistive Device grab bars/safety frame    Transfer Techniques standing pivot       Tub Transfer Training    [place], Tub Transfer modified independence       Lower Body Dressing    Lower Body Dressing [place] not tested    Comment Pt declined and reports wife will assist at home       AM-PAC (TM) - ADL (Current Function)    Putting on and taking off regular lower body clothing? 3 - A Little    Bathing? 3 - A Little    Toileting? 4 - None    Putting on/taking off regular upper body clothing? 4 - None    How much help for taking care of personal grooming? 4 - None    Eating meals? 4 - None    AM-PAC (TM) ADL Score 22       OT Clinical Impression    Patient's Goals For Discharge return home    Daily Outcome Statement Pt is able to complete all transfers and functional mobility with [place]/Mod I without AD taking an increased amount of time to complete.  Pt reports [place] with UB ADLs and declines further education on LB ADLs reporting wife will assist.  Pt has no further OT needs.  D/C OT and rec home when medically stable.                        Education provided this session. See the Patient Education summary report for full details.    OT  Care Plan Goals      Most Recent Value   Bed Mobility Goal   Date Goal Established: Bed Mobility  11/21/18   Time to Achieve Goal: Bed Mobility  2 days   Goal Activity: Bed Mobility  all bed mobility activities   Level of [place] Goal: Bed Mobility  independent   Goal Outcome: Bed Mobility  goal met   Transfer Goal   Date Goal Established: Transfer Training  11/21/18   Time to Achieve Goal: Transfer Training  2 days   Goal Activity: Transfer Training  all transfers   Level of [place] Goal: Transfer Training  modified independence   Goal Outcome: Transfer Training  goal met

## 2018-11-22 NOTE — PLAN OF CARE
Problem: Patient Care Overview  Goal: Plan of Care Review  Outcome: Ongoing (interventions implemented as appropriate)   11/22/18 1043   Coping/Psychosocial   Plan Of Care Reviewed With patient   Plan of Care Review   Progress progress toward functional goals as expected   Outcome Summary Pt is able to complete all transfers and functional mobility with [place]/Mod I without AD taking an increased amount of time to complete. Pt reports [place] with UB ADLs and declines further education on LB ADLs reporting wife will assist. Pt has no further OT needs. D/C OT and rec home when medically stable.       Problem: Laminectomy/Foraminotomy/Discectomy (Adult)  Goal: Signs and Symptoms of Listed Potential Problems Will be Absent, Minimized or Managed (Laminectomy/Foraminotomy/Discectomy)  Outcome: Ongoing (interventions implemented as appropriate)

## 2018-11-22 NOTE — PROGRESS NOTES
Pt without new complaints, doing well  AFVSS THIERNO in place  5/5 motor BLE all muscle groups  SILT BLE all sens dist  Dressing dry     A/P Stable post op day 1  -pt, oob  -pain control  -abx/drain  -scds  -med management

## 2018-11-23 LAB
ANION GAP SERPL CALC-SCNC: 7 MEQ/L (ref 3–15)
BUN SERPL-MCNC: 23 MG/DL (ref 8–20)
CALCIUM SERPL-MCNC: 8.6 MG/DL (ref 8.9–10.3)
CHLORIDE SERPL-SCNC: 96 MEQ/L (ref 98–109)
CO2 SERPL-SCNC: 30 MEQ/L (ref 22–32)
CREAT SERPL-MCNC: 1 MG/DL (ref 0.8–1.3)
ERYTHROCYTE [DISTWIDTH] IN BLOOD BY AUTOMATED COUNT: 12.6 % (ref 11.6–14.4)
GFR SERPL CREATININE-BSD FRML MDRD: >60 ML/MIN/1.73M*2
GLUCOSE SERPL-MCNC: 108 MG/DL (ref 70–99)
HCT VFR BLDCO AUTO: 34 % (ref 40.1–51)
HGB BLD-MCNC: 12.1 G/DL (ref 13.7–17.5)
MCH RBC QN AUTO: 29.6 PG (ref 28–33.2)
MCHC RBC AUTO-ENTMCNC: 35.6 G/DL (ref 32.2–36.5)
MCV RBC AUTO: 83.1 FL (ref 83–98)
PDW BLD AUTO: 9.8 FL (ref 9.4–12.4)
PLATELET # BLD AUTO: 158 K/UL (ref 150–350)
POTASSIUM SERPL-SCNC: 3.5 MEQ/L (ref 3.6–5.1)
RBC # BLD AUTO: 4.09 M/UL (ref 4.5–5.8)
SODIUM SERPL-SCNC: 133 MEQ/L (ref 136–144)
WBC # BLD AUTO: 10.49 K/UL (ref 3.8–10.5)

## 2018-11-23 PROCEDURE — 80048 BASIC METABOLIC PNL TOTAL CA: CPT | Performed by: NURSE PRACTITIONER

## 2018-11-23 PROCEDURE — 85027 COMPLETE CBC AUTOMATED: CPT | Performed by: NURSE PRACTITIONER

## 2018-11-23 PROCEDURE — 63600000 HC DRUGS/DETAIL CODE: Performed by: NURSE PRACTITIONER

## 2018-11-23 PROCEDURE — 63700000 HC SELF-ADMINISTRABLE DRUG: Performed by: INTERNAL MEDICINE

## 2018-11-23 PROCEDURE — 20600000 HC ROOM AND CARE INTERMEDIATE/TELEMETRY

## 2018-11-23 PROCEDURE — 99232 SBSQ HOSP IP/OBS MODERATE 35: CPT | Performed by: INTERNAL MEDICINE

## 2018-11-23 PROCEDURE — 63700000 HC SELF-ADMINISTRABLE DRUG: Performed by: NURSE PRACTITIONER

## 2018-11-23 RX ORDER — POTASSIUM CHLORIDE 20 MEQ/1
40 TABLET, EXTENDED RELEASE ORAL ONCE
Status: COMPLETED | OUTPATIENT
Start: 2018-11-23 | End: 2018-11-23

## 2018-11-23 RX ORDER — SYRING-NEEDL,DISP,INSUL,0.3 ML 29 G X1/2"
148 SYRINGE, EMPTY DISPOSABLE MISCELLANEOUS ONCE
Status: COMPLETED | OUTPATIENT
Start: 2018-11-23 | End: 2018-11-23

## 2018-11-23 RX ADMIN — OXYCODONE HYDROCHLORIDE 5 MG: 5 TABLET ORAL at 21:53

## 2018-11-23 RX ADMIN — SENNOSIDES AND DOCUSATE SODIUM 1 TABLET: 8.6; 5 TABLET ORAL at 20:03

## 2018-11-23 RX ADMIN — CEFAZOLIN SODIUM 2 G: 2 SOLUTION INTRAVENOUS at 09:17

## 2018-11-23 RX ADMIN — SENNOSIDES AND DOCUSATE SODIUM 1 TABLET: 8.6; 5 TABLET ORAL at 07:49

## 2018-11-23 RX ADMIN — CEFAZOLIN SODIUM 2 G: 2 SOLUTION INTRAVENOUS at 02:31

## 2018-11-23 RX ADMIN — OXYCODONE HYDROCHLORIDE 10 MG: 5 TABLET ORAL at 00:33

## 2018-11-23 RX ADMIN — OXYCODONE HYDROCHLORIDE 5 MG: 5 TABLET ORAL at 17:53

## 2018-11-23 RX ADMIN — MAGNESIUM CITRATE 148 ML: 1.75 LIQUID ORAL at 09:17

## 2018-11-23 RX ADMIN — MONTELUKAST SODIUM 10 MG: 10 TABLET, FILM COATED ORAL at 21:54

## 2018-11-23 RX ADMIN — OXYCODONE HYDROCHLORIDE 5 MG: 5 TABLET ORAL at 07:49

## 2018-11-23 RX ADMIN — DIAZEPAM 5 MG: 5 TABLET ORAL at 00:33

## 2018-11-23 RX ADMIN — CEFAZOLIN SODIUM 2 G: 2 SOLUTION INTRAVENOUS at 17:52

## 2018-11-23 RX ADMIN — POTASSIUM CHLORIDE 40 MEQ: 1500 TABLET, EXTENDED RELEASE ORAL at 07:49

## 2018-11-23 NOTE — PLAN OF CARE
Problem: Patient Care Overview  Goal: Plan of Care Review  Outcome: Ongoing (interventions implemented as appropriate)   11/23/18 0252   Coping/Psychosocial   Plan Of Care Reviewed With patient   Plan of Care Review   Progress progress toward functional goals as expected       Problem: Laminectomy/Foraminotomy/Discectomy (Adult)  Goal: Signs and Symptoms of Listed Potential Problems Will be Absent, Minimized or Managed (Laminectomy/Foraminotomy/Discectomy)  Outcome: Ongoing (interventions implemented as appropriate)

## 2018-11-23 NOTE — PROGRESS NOTES
Discussed plan of care during care progression rounds, THIERNO still in, plan is d/c to home in am after BM. No skilled care needs are anticipated at this time.

## 2018-11-23 NOTE — PLAN OF CARE
Problem: Patient Care Overview  Goal: Plan of Care Review  Outcome: Ongoing (interventions implemented as appropriate)   11/23/18 1807   Coping/Psychosocial   Plan Of Care Reviewed With patient   Plan of Care Review   Progress improving       Problem: Laminectomy/Foraminotomy/Discectomy (Adult)  Goal: Signs and Symptoms of Listed Potential Problems Will be Absent, Minimized or Managed (Laminectomy/Foraminotomy/Discectomy)  Outcome: Ongoing (interventions implemented as appropriate)   11/23/18 1807   Laminectomy/Foraminotomy/Discectomy   Problems Assessed (Laminectomy/Laminotomy/Discectomy) all   Problems Present (Laminectomy/Laminotomy/Discectomy) pain;fluid/electrolyte imbalance

## 2018-11-23 NOTE — PROGRESS NOTES
Inpatient Cardiology   Daily Progress Note       Overview:    - S/P L5- S1 PLDF on 11/21/18     Assessment/Plan   Metabolic syndrome   Assessment & Plan    - Dyslipidemia, HTN, BMI 34. Glucose appears controlled upon review of labs  .  - BP management as above  - Defer statin initiation to outpatient primary care setting  - Lifestyle modification  - EtC02 monitoring per protocol   - Monitor for signs or symptoms of hypercapnea   - Aggressive pulmonary toilet  - GERD/aspiration precautions           Essential hypertension   Assessment & Plan    - Azilsartan/Chlorthalidone not available on formulary.   .  - Hold for now. Resume post op as BP requires, would be able to use his home supply when indicated.  - Na level improving - I counseled patient to continue to minimize intake of fluids  - Monitor BMP  - K 3.5 today - will give KCL 40 mEq x 1        * S/P lumbar fusion   Overview    - S/P L5- S1 PLDF on 11/21/18. EBL 300cc     Assessment & Plan    - DVT prophylaxis and pain management per ortho service  - Pulmonary toilet  - Ambulate  - Bowel regimen  - Hold all natural supplements. Resume post op when ok to do so per ortho                 Subjective   Denies any CP, SOB, dizziness; otherwise negative ROS.      Current Medications:    Scheduled:    ceFAZolin 2 g intravenous q8h INT   montelukast 10 mg oral Nightly   NON FORMULARY REQUEST 250 mcg inhalation BID   sennosides-docusate sodium 1 tablet oral BID       Infusions:       PRN:  •  acetaminophen  •  alum-mag hydroxide-simeth  •  bisacodyl  •  diazePAM  •  NON FORMULARY REQUEST  •  ondansetron  •  oxyCODONE  •  oxyCODONE     Objective   Vital signs in last 24 hours:  Temp:  [36.8 °C (98.3 °F)-37.4 °C (99.4 °F)] 36.8 °C (98.3 °F)  Heart Rate:  [66-86] 86  Resp:  [16-18] 18  BP: (113-151)/(59-74) 122/74    Wt Readings from Last 3 Encounters:   11/21/18 113 kg (250 lb)   11/19/18 115 kg (253 lb)   11/19/18 115 kg (253 lb)       Physical Exam   Constitutional:  He is oriented to person, place, and time. He appears well-developed and well-nourished.   obese   HENT:   Head: Normocephalic.   Eyes: EOM are normal.   Neck: No JVD present.   Cardiovascular: Normal rate and regular rhythm.  Exam reveals no friction rub.    No murmur heard.  + pedal pulses   Pulmonary/Chest: Effort normal. He has no wheezes. He has no rales.   Abdominal: Soft. Bowel sounds are normal.   Musculoskeletal: He exhibits no edema.   Neurological: He is alert and oriented to person, place, and time.   Skin: Skin is warm and dry. He is not diaphoretic.   Psychiatric: He has a normal mood and affect.          Labs and Data:      Hematology  Lab Results   Component Value Date    WBC 10.49 11/23/2018    HGB 12.1 (L) 11/23/2018    HCT 34.0 (L) 11/23/2018     11/23/2018    INR 1.0 11/19/2018       Chemistries  Lab Results   Component Value Date     (L) 11/23/2018    K 3.5 (L) 11/23/2018    CL 96 (L) 11/23/2018    CREATININE 1.0 11/23/2018    BUN 23 (H) 11/23/2018    CO2 30 11/23/2018    CALCIUM 8.6 (L) 11/23/2018    ALT 62 11/19/2018    AST 41 11/19/2018       Biomarkers  No results found for: CKTOTAL, TROPONINI, BNP    Cholesterol  Lab Results   Component Value Date    CHOL 216 (H) 11/19/2018    TRIG 227 (H) 11/19/2018    HDL 39 (L) 11/19/2018    LDLCALC 132 (H) 11/19/2018    NONHDLCALC 177 11/19/2018       Endocrine  No results found for: TSH, FREET4, HGBA1C   Radiology:  I have independently reviewed the pertinent imaging from the last 24 hrs.    X-ray Chest 2 Views    Result Date: 11/19/2018  IMPRESSION: No acute disease.    X-ray Spine 1 View    Result Date: 11/21/2018  IMPRESSION: Intraoperative radiograph showing localizer posteriorly at the level of L5.    X-ray Lumbar Spine 2 Or 3 Views    Result Date: 11/21/2018  IMPRESSION: Postoperative changes related to lumbar fusion at L5-S1.                 Efrain Gonzáles MD  11/23/2018

## 2018-11-23 NOTE — PLAN OF CARE
Problem: Patient Care Overview  Goal: Plan of Care Review  Outcome: Ongoing (interventions implemented as appropriate)   11/23/18 0822   Coping/Psychosocial   Plan Of Care Reviewed With patient   Plan of Care Review   Progress improving       Problem: Laminectomy/Foraminotomy/Discectomy (Adult)  Goal: Signs and Symptoms of Listed Potential Problems Will be Absent, Minimized or Managed (Laminectomy/Foraminotomy/Discectomy)  Outcome: Ongoing (interventions implemented as appropriate)   11/23/18 0822   Laminectomy/Foraminotomy/Discectomy   Problems Assessed (Laminectomy/Laminotomy/Discectomy) all   Problems Present (Laminectomy/Laminotomy/Discectomy) fluid/electrolyte imbalance;pain      11/23/18 0822   Laminectomy/Foraminotomy/Discectomy   Problems Assessed (Laminectomy/Laminotomy/Discectomy) all   Problems Present (Laminectomy/Laminotomy/Discectomy) fluid/electrolyte imbalance;pain

## 2018-11-23 NOTE — PROGRESS NOTES
Patient without complaints; +flatus; no BM yet postop     VSS; AAO; NAD  Postop dsg changed->surgical site normal; THIERNO intact=40cc/8 hrs  BLLE 5/5 motor and SILT  Abdomen soft/ND  SCDs intact     AP/ 38YOM stable POD#2    - continue bowel regimen (add mag citrate)-see orders  - PT/OT/OOB per protocol  - dvt prophylax:per Dr Cavanaugh, maintain scds when in bed and mobilize as tolerated  - medical management per [facility]  - see orders for detail  - plan for discharge home tomorrow if BM

## 2018-11-24 VITALS
SYSTOLIC BLOOD PRESSURE: 132 MMHG | HEIGHT: 72 IN | OXYGEN SATURATION: 100 % | RESPIRATION RATE: 16 BRPM | WEIGHT: 250 LBS | BODY MASS INDEX: 33.86 KG/M2 | HEART RATE: 86 BPM | DIASTOLIC BLOOD PRESSURE: 76 MMHG | TEMPERATURE: 97.6 F

## 2018-11-24 LAB
ANION GAP SERPL CALC-SCNC: 10 MEQ/L (ref 3–15)
BUN SERPL-MCNC: 20 MG/DL (ref 8–20)
CALCIUM SERPL-MCNC: 8.6 MG/DL (ref 8.9–10.3)
CHLORIDE SERPL-SCNC: 95 MEQ/L (ref 98–109)
CO2 SERPL-SCNC: 29 MEQ/L (ref 22–32)
CREAT SERPL-MCNC: 1.1 MG/DL (ref 0.8–1.3)
ERYTHROCYTE [DISTWIDTH] IN BLOOD BY AUTOMATED COUNT: 12.6 % (ref 11.6–14.4)
GFR SERPL CREATININE-BSD FRML MDRD: >60 ML/MIN/1.73M*2
GLUCOSE SERPL-MCNC: 107 MG/DL (ref 70–99)
HCT VFR BLDCO AUTO: 36.1 % (ref 40.1–51)
HGB BLD-MCNC: 12.7 G/DL (ref 13.7–17.5)
MCH RBC QN AUTO: 29.5 PG (ref 28–33.2)
MCHC RBC AUTO-ENTMCNC: 35.2 G/DL (ref 32.2–36.5)
MCV RBC AUTO: 83.8 FL (ref 83–98)
PDW BLD AUTO: 9.7 FL (ref 9.4–12.4)
PLATELET # BLD AUTO: 206 K/UL (ref 150–350)
POTASSIUM SERPL-SCNC: 3.7 MEQ/L (ref 3.6–5.1)
RBC # BLD AUTO: 4.31 M/UL (ref 4.5–5.8)
SODIUM SERPL-SCNC: 134 MEQ/L (ref 136–144)
WBC # BLD AUTO: 10.69 K/UL (ref 3.8–10.5)

## 2018-11-24 PROCEDURE — 63700000 HC SELF-ADMINISTRABLE DRUG: Performed by: NURSE PRACTITIONER

## 2018-11-24 PROCEDURE — 63600000 HC DRUGS/DETAIL CODE: Performed by: NURSE PRACTITIONER

## 2018-11-24 PROCEDURE — 85027 COMPLETE CBC AUTOMATED: CPT | Performed by: NURSE PRACTITIONER

## 2018-11-24 PROCEDURE — 80048 BASIC METABOLIC PNL TOTAL CA: CPT | Performed by: NURSE PRACTITIONER

## 2018-11-24 PROCEDURE — 99232 SBSQ HOSP IP/OBS MODERATE 35: CPT | Performed by: INTERNAL MEDICINE

## 2018-11-24 RX ORDER — OXYCODONE HYDROCHLORIDE 5 MG/1
5 TABLET ORAL EVERY 4 HOURS PRN
Qty: 30 TABLET | Refills: 0 | Status: SHIPPED | OUTPATIENT
Start: 2018-11-24 | End: 2019-05-28

## 2018-11-24 RX ORDER — DIAZEPAM 5 MG/1
5 TABLET ORAL EVERY 6 HOURS PRN
Qty: 30 TABLET | Refills: 0 | Status: SHIPPED | OUTPATIENT
Start: 2018-11-24 | End: 2019-05-28

## 2018-11-24 RX ADMIN — CEFAZOLIN SODIUM 2 G: 2 SOLUTION INTRAVENOUS at 02:37

## 2018-11-24 RX ADMIN — OXYCODONE HYDROCHLORIDE 5 MG: 5 TABLET ORAL at 03:56

## 2018-11-24 RX ADMIN — CEFAZOLIN SODIUM 2 G: 2 SOLUTION INTRAVENOUS at 11:33

## 2018-11-24 RX ADMIN — SENNOSIDES AND DOCUSATE SODIUM 1 TABLET: 8.6; 5 TABLET ORAL at 08:35

## 2018-11-24 NOTE — PROGRESS NOTES
Inpatient Cardiology   Daily Progress Note       Overview:    - S/P L5- S1 PLDF on 11/21/18     Assessment/Plan   Metabolic syndrome   Assessment & Plan    - Dyslipidemia, HTN, BMI 34. Glucose appears controlled upon review of labs  .  - BP management as above  - Defer statin initiation to outpatient primary care setting  - Lifestyle modification  - EtC02 monitoring per protocol   - Monitor for signs or symptoms of hypercapnea   - Aggressive pulmonary toilet  - GERD/aspiration precautions           Essential hypertension   Assessment & Plan    - Azilsartan/Chlorthalidone not available on formulary - resume upon d/c as BP requires  - Na level improving  - Monitor BMP  - K is better, follow with PCP         * S/P lumbar fusion   Overview    - S/P L5- S1 PLDF on 11/21/18. EBL 300cc     Assessment & Plan    - DVT prophylaxis and pain management per ortho service  - Pulmonary toilet  - Ambulate  - Bowel regimen  - Hold all natural supplements. Resume post op when ok to do so per ortho     Stable from medical standpoint to d/c, pending clearance from PT and ortho.                Subjective   Denies any CP, SOB, dizziness; had a few BMs; otherwise negative ROS.      Current Medications:    Scheduled:    ceFAZolin 2 g intravenous q8h INT   montelukast 10 mg oral Nightly   NON FORMULARY REQUEST 250 mcg inhalation BID   sennosides-docusate sodium 1 tablet oral BID       Infusions:       PRN:  •  acetaminophen  •  alum-mag hydroxide-simeth  •  bisacodyl  •  diazePAM  •  NON FORMULARY REQUEST  •  ondansetron  •  oxyCODONE  •  oxyCODONE     Objective   Vital signs in last 24 hours:  Temp:  [36.4 °C (97.6 °F)-37.1 °C (98.7 °F)] 36.4 °C (97.6 °F)  Heart Rate:  [81-93] 86  Resp:  [16-18] 16  BP: (129-149)/(63-76) 132/76    Wt Readings from Last 3 Encounters:   11/21/18 113 kg (250 lb)   11/19/18 115 kg (253 lb)   11/19/18 115 kg (253 lb)       Physical Exam   Constitutional: He is oriented to person, place, and time. He  appears well-developed and well-nourished.   obese   HENT:   Head: Normocephalic.   Eyes: EOM are normal.   Neck: No JVD present.   Cardiovascular: Normal rate and regular rhythm.  Exam reveals no friction rub.    No murmur heard.  + pedal pulses   Pulmonary/Chest: Effort normal. He has no wheezes. He has no rales.   Abdominal: Soft. Bowel sounds are normal.   Musculoskeletal: He exhibits no edema.   Neurological: He is alert and oriented to person, place, and time.   Skin: Skin is warm and dry. He is not diaphoretic.   Psychiatric: He has a normal mood and affect.          Labs and Data:      Hematology  Lab Results   Component Value Date    WBC 10.69 (H) 11/24/2018    HGB 12.7 (L) 11/24/2018    HCT 36.1 (L) 11/24/2018     11/24/2018    INR 1.0 11/19/2018       Chemistries  Lab Results   Component Value Date     (L) 11/24/2018    K 3.7 11/24/2018    CL 95 (L) 11/24/2018    CREATININE 1.1 11/24/2018    BUN 20 11/24/2018    CO2 29 11/24/2018    CALCIUM 8.6 (L) 11/24/2018    ALT 62 11/19/2018    AST 41 11/19/2018       Biomarkers  No results found for: CKTOTAL, TROPONINI, BNP    Cholesterol  Lab Results   Component Value Date    CHOL 216 (H) 11/19/2018    TRIG 227 (H) 11/19/2018    HDL 39 (L) 11/19/2018    LDLCALC 132 (H) 11/19/2018    NONHDLCALC 177 11/19/2018       Endocrine  No results found for: TSH, FREET4, HGBA1C   Radiology:  I have independently reviewed the pertinent imaging from the last 24 hrs.    X-ray Chest 2 Views    Result Date: 11/19/2018  IMPRESSION: No acute disease.    X-ray Spine 1 View    Result Date: 11/21/2018  IMPRESSION: Intraoperative radiograph showing localizer posteriorly at the level of L5.    X-ray Lumbar Spine 2 Or 3 Views    Result Date: 11/21/2018  IMPRESSION: Postoperative changes related to lumbar fusion at L5-S1.                 Efrain Gonzáles MD  11/24/2018

## 2018-11-24 NOTE — DISCHARGE SUMMARY
Inpatient Discharge Summary    BRIEF OVERVIEW  Admitting Provider:  H&P Notes 10/25/2018 to 11/24/2018     Date of Service Author Author Type Status Note Type File Time    11/19/18 1215 Peggy Gallego CRNP Nurse Practitioner Signed H&P 11/19/18 1328          Attending Provider: John Cavanaugh MD Attending phys phone: [phone]  Primary Care Physician at Discharge: Benja Smith [phone]    Admission Date: 11/21/2018     Discharge Date: 11/24/2018    Primary Discharge Diagnosis  S/P lumbar fusion    Secondary Discharge Diagnosis  Active Hospital Problems    Diagnosis Date Noted   • S/P lumbar fusion 11/21/2018   • Asthmatic bronchitis 11/14/2018   • Metabolic syndrome 04/20/2017   • Essential hypertension 03/23/2017      Resolved Hospital Problems    Diagnosis Date Noted Date Resolved   No resolved problems to display.       DETAILS OF HOSPITAL STAY    Operative Procedures Performed  Procedure(s):  L5-S1 Posterior Lumbar Decompression Posterior Interbody Fusion Instrumentation Cage Allograft/Autograft L4-5 Decompression    Consults:   Consult Notes 10/25/2018 to 11/24/2018     Date of Service Author Author Type Status Note Type File Time    11/22/18 1222 Violette Tayolr RN Case Manager Signed Consults 11/22/18 1224          Consult Orders During Admission:  IP CONSULT TO CARDIOLOGY  IP CONSULT TO CASE MANAGEMENT     Imaging  X-ray Chest 2 Views    Result Date: 11/19/2018  IMPRESSION: No acute disease.    X-ray Spine 1 View    Result Date: 11/21/2018  IMPRESSION: Intraoperative radiograph showing localizer posteriorly at the level of L5.    X-ray Lumbar Spine 2 Or 3 Views    Result Date: 11/21/2018  IMPRESSION: Postoperative changes related to lumbar fusion at L5-S1.        Presenting Problem/History of Present Illness  S/P lumbar fusion       Exam on Day of Discharge  Patient seen and examined on day of discharge.     Hospital Course    Pt is a 38 y.o. male who presented a history of back and  leg pain. Workup revealed Right side L4-L5 and bilateral L5-S1 stenosis with radiculopathy. Dr. Cavanaugh recommended L4-S1 decompression, and L5-S1 fusion. The patient underwent the procedure on 11/21/2018. Patient tolerated the procedure well. The patient's post-operative course was unremarkable. The patient was seen by the orthopaedic and hospital medicine service and progressed to the point that on the day of discharge they were voiding without difficulty, tolerating a house diet, and was comfortable with all post-operative activity restrictions. At discharge the patient's vital signs were stable and the surgical incision site was clean, dry and intact. The patient was discharged on 11/24/2018 and told to continue pain medications and bowel regimen as needed, resume home medications except for those marked as held on discharge instructions, and to follow up with their surgeon. The patient was advised to call the office with any problems, including drainage fromthe incision, redness around the incision, worsening pain, fever greater than 101 degrees F, new numbness, weakness, or tingling in the extremities, numbness of the genital or saddle region, or incontinence of bowel or bladder.        Discharge Orders     Medication List      CONTINUE taking these medications    ADVAIR DISKUS 250-50 mcg/dose diskus inhaler  Generic drug:  fluticasone-salmeterol  INHALE 1 PUFF BY MOUTH TWICE A DAY EVERY DAY IN THE MORNING AND IN THE EVENING APPROX 12HRS APART     albuterol HFA 90 mcg/actuation inhaler  Commonly known as:  PROAIR HFA  Inhale 2 puffs 3 (three) times a day as needed for wheezing.     azilsartan med-chlorthalidone 40-12.5 mg tablet  Commonly known as:  EDARBYCLOR  Take 1 tablet by mouth once daily.     montelukast 10 mg tablet  Commonly known as:  SINGULAIR  Take 1 tablet (10 mg total) by mouth nightly.              Outpatient Follow-Ups            In 3 weeks [facility] US2 IP [facility] Radiology - Ultrasound     In 5 months Benja Smith MD [facility] in [place]        Referrals:  No orders of the defined types were placed in this encounter.      Active Issues Requiring Follow-up  Issue: NA  What is Needed: NA      Test Results Pending at Discharge  Unresulted Labs     Start     Ordered    11/22/18 0600  Basic metabolic panel  Daily     Start Status   11/25/18 0600 Scheduled   11/26/18 0600 Scheduled       11/21/18 1455    11/22/18 0600  CBC  Daily     Start Status   11/25/18 0600 Scheduled   11/26/18 0600 Scheduled       11/21/18 1455            Discharge Disposition  Home   Code Status at Discharge: Full Code  Physician Order for Life-Sustaining Treatment Document Status      No documents found

## 2018-11-24 NOTE — PROGRESS NOTES
Patient without complaints     VSS; AAO; NAD  Postop dsg changed->surgical site normal; THIERNO intact=40cc/8 hrs  BLLE 5/5 motor and SILT  Abdomen soft/ND  SCDs intact     AP/ 38YOM stable POD#3    - continue bowel regimen (add mag citrate)-see orders  - PT/OT/OOB per protocol  - dvt prophylax:per Dr Cavanaugh, maintain scds when in bed and mobilize as tolerated  - medical management per [facility]  - see orders for detail  - plan for discharge home today after THIERNO discontinued    Dante Kramer MD

## 2018-11-24 NOTE — PLAN OF CARE
Problem: Patient Care Overview  Goal: Plan of Care Review  Outcome: Ongoing (interventions implemented as appropriate)   11/23/18 1807 11/23/18 2004 11/24/18 0140   Coping/Psychosocial   Plan Of Care Reviewed With --  patient --    Plan of Care Review   Progress improving --  --    Outcome Summary --  --  Pain well controlled. Patient OOB idependently. SCDs in place. Will continue to monitor.       Problem: Laminectomy/Foraminotomy/Discectomy (Adult)  Goal: Signs and Symptoms of Listed Potential Problems Will be Absent, Minimized or Managed (Laminectomy/Foraminotomy/Discectomy)  Outcome: Ongoing (interventions implemented as appropriate)

## 2018-12-06 ENCOUNTER — TELEPHONE (OUTPATIENT)
Dept: FAMILY MEDICINE | Facility: CLINIC | Age: 38
End: 2018-12-06

## 2018-12-06 NOTE — TELEPHONE ENCOUNTER
One should never, ever do a lipid panel  On anything but an empty stomach: eating renders the test useless.  I will leave it up to him whether to repeat this or just wait until next year's physical!!

## 2018-12-06 NOTE — TELEPHONE ENCOUNTER
Spoke to pt states these lab results were from testing he did last month for pre op ordered and done at [facility]. States they told him he did not have to fast and he didn't so labs are not accurate.  Pt wondering when he should go get your order done since they already letitia lipids and pt does not want a bill

## 2018-12-06 NOTE — TELEPHONE ENCOUNTER
----- Message from Benja Smith MD sent at 12/6/2018 12:49 PM EST -----  Notify patient of lab results.  Triglycerides remain high at 227, cholesterol is borderline high with low HDL at 39, though improved from 34 1 year ago.  Try to increase exercise, increase intake of all of oil and fish oil to help boost the HDL while reducing all white starches especially bread pasta potatoes sugary drinks and alcohol to get the triglycerides down.

## 2018-12-07 NOTE — TELEPHONE ENCOUNTER
Lmom for pt with advice. Told pt to call back if he decides to have test repeated and we would order

## 2018-12-10 RX ORDER — FLUTICASONE PROPIONATE AND SALMETEROL 50; 250 UG/1; UG/1
POWDER RESPIRATORY (INHALATION)
Qty: 60 EACH | Refills: 2 | Status: SHIPPED | OUTPATIENT
Start: 2018-12-10 | End: 2019-04-04 | Stop reason: SDUPTHER

## 2019-04-05 RX ORDER — FLUTICASONE PROPIONATE AND SALMETEROL 250; 50 UG/1; UG/1
POWDER RESPIRATORY (INHALATION)
Qty: 60 EACH | Refills: 2 | Status: SHIPPED | OUTPATIENT
Start: 2019-04-05 | End: 2019-09-29 | Stop reason: SDUPTHER

## 2019-05-28 ENCOUNTER — OFFICE VISIT (OUTPATIENT)
Dept: FAMILY MEDICINE | Facility: CLINIC | Age: 39
End: 2019-05-28
Payer: COMMERCIAL

## 2019-05-28 VITALS
OXYGEN SATURATION: 98 % | HEIGHT: 72 IN | SYSTOLIC BLOOD PRESSURE: 121 MMHG | BODY MASS INDEX: 33.18 KG/M2 | DIASTOLIC BLOOD PRESSURE: 76 MMHG | RESPIRATION RATE: 18 BRPM | WEIGHT: 245 LBS | TEMPERATURE: 99.3 F | HEART RATE: 84 BPM

## 2019-05-28 DIAGNOSIS — J45.30 MILD PERSISTENT ASTHMATIC BRONCHITIS WITHOUT COMPLICATION: ICD-10-CM

## 2019-05-28 DIAGNOSIS — I10 ESSENTIAL HYPERTENSION: Primary | ICD-10-CM

## 2019-05-28 DIAGNOSIS — L91.8 SKIN TAG: ICD-10-CM

## 2019-05-28 PROCEDURE — 11200 RMVL SKIN TAGS UP TO&INC 15: CPT | Performed by: FAMILY MEDICINE

## 2019-05-28 PROCEDURE — 99213 OFFICE O/P EST LOW 20 MIN: CPT | Mod: 25 | Performed by: FAMILY MEDICINE

## 2019-05-28 RX ORDER — ALBUTEROL SULFATE 90 UG/1
INHALANT RESPIRATORY (INHALATION)
COMMUNITY
Start: 2019-02-21 | End: 2019-05-28

## 2019-05-28 RX ORDER — AZILSARTAN KAMEDOXOMIL AND CHLORTHALIDONE 40; 12.5 MG/1; MG/1
1 TABLET ORAL
Qty: 90 TABLET | Refills: 1 | Status: SHIPPED | OUTPATIENT
Start: 2019-05-28 | End: 2019-11-24 | Stop reason: SDUPTHER

## 2019-05-28 RX ORDER — ALBUTEROL SULFATE 90 UG/1
INHALANT RESPIRATORY (INHALATION)
Refills: 3 | COMMUNITY
Start: 2019-02-21 | End: 2019-05-28

## 2019-05-28 RX ORDER — AZILSARTAN KAMEDOXOMIL AND CHLORTHALIDONE 40; 12.5 MG/1; MG/1
1 TABLET ORAL
Refills: 1 | COMMUNITY
Start: 2019-05-20 | End: 2019-05-28 | Stop reason: SDUPTHER

## 2019-05-28 RX ORDER — MONTELUKAST SODIUM 10 MG/1
TABLET ORAL
Qty: 90 TABLET | Refills: 1 | Status: SHIPPED | OUTPATIENT
Start: 2019-05-28 | End: 2019-12-17 | Stop reason: SDUPTHER

## 2019-05-28 ASSESSMENT — ENCOUNTER SYMPTOMS
COUGH: 0
DIAPHORESIS: 0
NAUSEA: 0
FLANK PAIN: 0
MYALGIAS: 0
HEADACHES: 0
CONSTIPATION: 0
SHORTNESS OF BREATH: 0
DIARRHEA: 0
UNEXPECTED WEIGHT CHANGE: 0
CHEST TIGHTNESS: 0
DYSURIA: 0
PALPITATIONS: 0
WEAKNESS: 0
BLOOD IN STOOL: 0
APPETITE CHANGE: 0
ADENOPATHY: 0
ARTHRALGIAS: 0
FEVER: 0
EYE REDNESS: 0
ACTIVITY CHANGE: 0
FATIGUE: 0
DIZZINESS: 0
SORE THROAT: 0
EYE PAIN: 0
ABDOMINAL PAIN: 0
VOMITING: 0

## 2019-05-28 NOTE — PROGRESS NOTES
"Subjective  Patient reports excellent outpatient blood pressure control and denies adverse side effects including cough dizziness headache fatigue or easy fatigability.    His asthma is under excellent control on Singulair plus Advair inhaler daily.  His need for escape albuterol inhaler is infrequent.    He requests elective excision of bothersome skin tag of the left lateral upper eyelid as previously discussed.     Patient ID: Andrew Thrasher is a 39 y.o. male.  1980      HPI    The following have been reviewed and updated as appropriate in this visit:  Allergies  Meds  Problems       Review of Systems   Constitutional: Negative for activity change, appetite change, diaphoresis, fatigue, fever and unexpected weight change.   HENT: Negative for congestion and sore throat.    Eyes: Negative for pain, redness and visual disturbance.   Respiratory: Negative for cough, chest tightness and shortness of breath.    Cardiovascular: Negative for chest pain and palpitations.   Gastrointestinal: Negative for abdominal pain, blood in stool, constipation, diarrhea, nausea and vomiting.   Genitourinary: Negative for dysuria and flank pain.   Musculoskeletal: Negative for arthralgias and myalgias.   Skin: Negative for rash.   Neurological: Negative for dizziness, weakness and headaches.   Hematological: Negative for adenopathy.   Psychiatric/Behavioral: Negative for behavioral problems.       Objective     Vitals:    05/28/19 1555 05/28/19 1622 05/28/19 1623   BP: 110/70 118/77 121/76   BP Location: Left upper arm Right upper arm Left upper arm   Patient Position: Sitting     Pulse: 84     Resp: 18     Temp: 37.4 °C (99.3 °F)     TempSrc: Oral     SpO2: 98%     Weight: 111 kg (245 lb)     Height: 1.829 m (6' 0.01\")       Body mass index is 33.22 kg/m².    Physical Exam   Constitutional: He appears well-developed and well-nourished. No distress.   HENT:   Otic Canals clear, Tympanic membranes intact  Eyes: KELLY CATALAN, " no conjunctival injection  Nose and throat clear, no inflammation or exudate, no oral mucosal lesions  Neck supple, with intact range of motion, no mass, no lymphadenopathy, no thyromegaly, no thyroid nodule, no carotid bruit, no JVD.   Cardiovascular: Normal rate, regular rhythm and normal heart sounds.  Exam reveals no gallop and no friction rub.    No murmur heard.  Pulmonary/Chest: Effort normal and breath sounds normal. He has no wheezes. He has no rales.    vs predicted  645   Musculoskeletal: He exhibits no edema.   Back no CVA tenderness    Legs: no edema, no lacy venous insufficiency changes   Skin: No rash noted.   There is a 4 mm long pedunculated skin tag on a 2 mm base the lateral edge of the left upper eyelid.  He requests excision today and reaffirmed the request after detailed discussion on risks and obtaining informed consent.   Psychiatric: He has a normal mood and affect.       Assessment/Plan   Diagnoses and all orders for this visit:    Essential hypertension (Primary)  Assessment & Plan:  Blood pressures are optimal.  Observe for hypotension.  Continue current meds, maintain good hydration.  Follow-up in 6 months.      Mild persistent asthmatic bronchitis without complication  Assessment & Plan:  Well-controlled, see peak flow readings under exam.  Continue current meds.      Skin tag  Assessment & Plan:  After obtaining informed consent, the skin tag was excised under local anesthesia per procedure note.  Wound dressed, patient tolerated procedure well, follow-up PRN.  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      Other orders  -     EDARBYCLOR 40-12.5 mg tablet; Take 1 tablet by mouth once daily.

## 2019-05-28 NOTE — ASSESSMENT & PLAN NOTE
Blood pressures are optimal.  Observe for hypotension.  Continue current meds, maintain good hydration.  Follow-up in 6 months.

## 2019-05-28 NOTE — ASSESSMENT & PLAN NOTE
After obtaining informed consent, the skin tag was excised under local anesthesia per procedure note.  Wound dressed, patient tolerated procedure well, follow-up PRN.  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

## 2019-05-28 NOTE — PROCEDURES
Procedures   After discussion and obtaining informed consent, the pedunculated skin tag on the left lateral margin of the upper eyelid was thoroughly prepped with Betadine and alcohol, base infiltrated with 1% Xylocaine without epinephrine, buffered, 0.2 cc, then resected the skin tag using radiofrequency loop technique.  Base was radio cauterized.  Neosporin and dressing applied, care instructed in detail.  Follow-up PRN

## 2019-05-28 NOTE — PATIENT INSTRUCTIONS
Essential hypertension  Blood pressures are optimal.  Observe for hypotension.  Continue current meds, maintain good hydration.  Follow-up in 6 months.    Asthmatic bronchitis  Well-controlled, see peak flow readings under exam.  Continue current meds.    Skin tag  After obtaining informed consent, the skin tag was excised under local anesthesia per procedure note.  Wound dressed, patient tolerated procedure well, follow-up PRN.  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

## 2019-12-17 DIAGNOSIS — J45.30 MILD PERSISTENT ASTHMATIC BRONCHITIS WITHOUT COMPLICATION: ICD-10-CM

## 2019-12-17 RX ORDER — MONTELUKAST SODIUM 10 MG/1
TABLET ORAL
Qty: 30 TABLET | Refills: 1 | Status: SHIPPED | OUTPATIENT
Start: 2019-12-17 | End: 2020-03-05

## 2019-12-26 RX ORDER — FLUTICASONE PROPIONATE AND SALMETEROL 250; 50 UG/1; UG/1
POWDER RESPIRATORY (INHALATION)
Qty: 60 EACH | Refills: 0 | Status: SHIPPED | OUTPATIENT
Start: 2019-12-26 | End: 2020-01-29

## 2019-12-26 NOTE — TELEPHONE ENCOUNTER
..  Medicine Refill Request    Last Office Visit: 5/28/2019  Next Office Visit: 1/28/2020        Current Outpatient Medications:   •  montelukast (SINGULAIR) 10 mg tablet, TAKE 1 TABLET BY MOUTH EVERY DAY AT NIGHT, Disp: 30 tablet, Rfl: 1  •  albuterol HFA (VENTOLIN HFA) 90 mcg/actuation inhaler, INHALE 2 PUFFS 3 TIMES A DAY AS NEEDED FOR WHEEZING, Disp: 1 Inhaler, Rfl: 3  •  EDARBYCLOR 40-12.5 mg tablet, TAKE 1 TABLET BY MOUTH EVERY DAY, Disp: 30 tablet, Rfl: 0  •  WIXELA INHUB 250-50 mcg/dose diskus inhaler, INHALE 1 PUFF BY MOUTH TWICE A DAY EVERY DAY IN THE MORNING & IN THE EVENING APPROX 12HRS APART, Disp: 60 each, Rfl: 0      BP Readings from Last 3 Encounters:   05/28/19 121/76   11/24/18 132/76   11/19/18 118/81       Recent Lab results:  Lab Results   Component Value Date    CHOL 216 (H) 11/19/2018   ,   Lab Results   Component Value Date    HDL 39 (L) 11/19/2018   ,   Lab Results   Component Value Date    LDLCALC 132 (H) 11/19/2018   ,   Lab Results   Component Value Date    TRIG 227 (H) 11/19/2018        Lab Results   Component Value Date    GLUCOSE 107 (H) 11/24/2018   , No results found for: HGBA1C      Lab Results   Component Value Date    CREATININE 1.1 11/24/2018       No results found for: TSH

## 2020-01-29 ENCOUNTER — OFFICE VISIT (OUTPATIENT)
Dept: FAMILY MEDICINE | Facility: CLINIC | Age: 40
End: 2020-01-29
Payer: COMMERCIAL

## 2020-01-29 VITALS
HEIGHT: 72 IN | DIASTOLIC BLOOD PRESSURE: 74 MMHG | SYSTOLIC BLOOD PRESSURE: 116 MMHG | WEIGHT: 254 LBS | HEART RATE: 80 BPM | RESPIRATION RATE: 18 BRPM | BODY MASS INDEX: 34.4 KG/M2 | TEMPERATURE: 98.2 F | OXYGEN SATURATION: 98 %

## 2020-01-29 DIAGNOSIS — N52.9 ERECTILE DYSFUNCTION, UNSPECIFIED ERECTILE DYSFUNCTION TYPE: ICD-10-CM

## 2020-01-29 DIAGNOSIS — I10 ESSENTIAL HYPERTENSION: Primary | ICD-10-CM

## 2020-01-29 PROCEDURE — 99213 OFFICE O/P EST LOW 20 MIN: CPT | Performed by: FAMILY MEDICINE

## 2020-01-29 RX ORDER — SILDENAFIL 100 MG/1
100 TABLET, FILM COATED ORAL DAILY PRN
Qty: 10 TABLET | Refills: 6 | Status: SHIPPED | OUTPATIENT
Start: 2020-01-29 | End: 2021-03-31

## 2020-01-29 ASSESSMENT — ENCOUNTER SYMPTOMS
FATIGUE: 0
HEADACHES: 0
PALPITATIONS: 0
CHEST TIGHTNESS: 0
VOMITING: 0
ADENOPATHY: 0
FEVER: 0
DYSURIA: 0
SHORTNESS OF BREATH: 0
UNEXPECTED WEIGHT CHANGE: 0
NAUSEA: 0

## 2020-01-29 NOTE — ASSESSMENT & PLAN NOTE
We had detailed discussion on his symptoms, possibly med related but cannot be certain.  For now, Rx with Viagra is appropriate, call if any side effect issues.  Had detailed discussion on risks side effects nitroglycerin interaction etc.

## 2020-01-29 NOTE — PATIENT INSTRUCTIONS
Essential hypertension  Great control: Stick with current meds follow-up 6 months.    Erectile dysfunction  We had detailed discussion on his symptoms, possibly med related but cannot be certain.  For now, Rx with Viagra is appropriate, call if any side effect issues.  Had detailed discussion on risks side effects nitroglycerin interaction etc.

## 2020-01-29 NOTE — PROGRESS NOTES
"Subjective  Patient reports ongoing good blood pressure control on Edarbi chlor.  He is feeling well overall except new complaint over the past 6 months of some early fade on erectile function and would like to try Viagra.  Denies loss and libido.  Symptoms did come on since establishing current antihypertensive regimen and could be partially or wholly side effect related.Denies any other concerns.     Patient ID: Andrew Thrasher is a 39 y.o. male.  1980      HPI    The following have been reviewed and updated as appropriate in this visit:  Allergies  Meds  Problems       Review of Systems   Constitutional: Negative for fatigue, fever and unexpected weight change (NO UNEXPLAINED WEIGHT LOSS).   Respiratory: Negative for chest tightness and shortness of breath.    Cardiovascular: Negative for chest pain and palpitations.   Gastrointestinal: Negative for nausea and vomiting.   Genitourinary: Negative for dysuria.   Skin: Negative for rash.   Neurological: Negative for headaches.   Hematological: Negative for adenopathy.       Objective     Vitals:    01/29/20 1124   BP: 116/74   BP Location: Left upper arm   Patient Position: Sitting   Pulse: 80   Resp: 18   Temp: 36.8 °C (98.2 °F)   TempSrc: Oral   SpO2: 98%   Weight: 115 kg (254 lb)   Height: 1.829 m (6' 0.01\")     Body mass index is 34.44 kg/m².    Physical Exam   Constitutional: He appears well-developed and well-nourished. No distress.   HENT:   Otic Canals clear, Tympanic membranes intact  Eyes: HOSSEIN, EOMI, no conjunctival injection  Nose and throat clear, no inflammation or exudate, no oral mucosal lesions  Neck supple, with intact range of motion, no mass, no lymphadenopathy, no thyromegaly, no thyroid nodule, no carotid bruit, no JVD.   Cardiovascular: Normal rate, regular rhythm and normal heart sounds. Exam reveals no gallop and no friction rub.   No murmur heard.  Pulmonary/Chest: Effort normal and breath sounds normal. He has no wheezes. He has " no rales.   Musculoskeletal: He exhibits no edema.   Back no CVA tenderness    Legs: no edema, no lacy venous insufficiency changes   Skin: No rash noted.   Psychiatric: He has a normal mood and affect.       Assessment/Plan   Diagnoses and all orders for this visit:    Essential hypertension (Primary)  Assessment & Plan:  Great control: Stick with current meds follow-up 6 months.    Orders:  -     azilsartan med-chlorthalidone (EDARBYCLOR) 40-12.5 mg tablet; Take 1 tablet by mouth once daily.  -     Comprehensive metabolic panel; Future  -     Lipid panel; Future  -     CBC and Differential; Future  -     Urinalysis with Reflex Culture (ED and Outpatient only); Future    Erectile dysfunction, unspecified erectile dysfunction type  Assessment & Plan:  We had detailed discussion on his symptoms, possibly med related but cannot be certain.  For now, Rx with Viagra is appropriate, call if any side effect issues.  Had detailed discussion on risks side effects nitroglycerin interaction etc.      Other orders  -     sildenafil (VIAGRA) 100 mg tablet; Take 1 tablet (100 mg total) by mouth daily as needed for erectile dysfunction.

## 2020-03-01 DIAGNOSIS — J45.30 MILD PERSISTENT ASTHMATIC BRONCHITIS WITHOUT COMPLICATION: ICD-10-CM

## 2020-03-01 RX ORDER — ALBUTEROL SULFATE 90 UG/1
INHALANT RESPIRATORY (INHALATION)
Qty: 8.5 INHALER | Refills: 3 | Status: SHIPPED | OUTPATIENT
Start: 2020-03-01 | End: 2020-07-07

## 2020-06-22 RX ORDER — FLUTICASONE PROPIONATE AND SALMETEROL 250; 50 UG/1; UG/1
1 POWDER RESPIRATORY (INHALATION) 2 TIMES DAILY
COMMUNITY
End: 2020-09-10

## 2020-06-22 RX ORDER — FLUTICASONE PROPIONATE AND SALMETEROL 250; 50 UG/1; UG/1
POWDER RESPIRATORY (INHALATION)
Qty: 60 EACH | Refills: 5 | Status: CANCELLED | OUTPATIENT
Start: 2020-06-22

## 2020-06-22 NOTE — TELEPHONE ENCOUNTER
Medicine Refill Request    Last Office Visit: 1/29/2020  Last Telemedicine Visit: Visit date not found    Next Office Visit: 7/29/2020  Next Telemedicine Visit: Visit date not found         Current Outpatient Medications:   •  fluticasone propion-salmeteroL (ADVAIR DISKUS) 250-50 mcg/dose diskus inhaler, Inhale 1 puff 2 (two) times a day. Rinse mouth with water after use to reduce aftertaste and incidence of candidiasis. Do not swallow., Disp: , Rfl:   •  albuterol HFA (VENTOLIN HFA) 90 mcg/actuation inhaler, INHALE 2 PUFFS 3 TIMES A DAY AS NEEDED FOR WHEEZING, Disp: 8.5 Inhaler, Rfl: 3  •  azilsartan med-chlorthalidone (EDARBYCLOR) 40-12.5 mg tablet, Take 1 tablet by mouth once daily., Disp: 30 tablet, Rfl: 5  •  montelukast (SINGULAIR) 10 mg tablet, TAKE 1 TABLET BY MOUTH EVERY DAY AT NIGHT, Disp: 30 tablet, Rfl: 3  •  sildenafil (VIAGRA) 100 mg tablet, Take 1 tablet (100 mg total) by mouth daily as needed for erectile dysfunction., Disp: 10 tablet, Rfl: 6  •  WIXELA INHUB 250-50 mcg/dose diskus inhaler, INHALE 1 PUFF BY MOUTH TWICE A DAY EVERY DAY IN THE MORNING & IN THE EVENING APPROX 12HRS APART, Disp: 60 each, Rfl: 5      BP Readings from Last 3 Encounters:   01/29/20 116/74   05/28/19 121/76   11/24/18 132/76       Recent Lab results:  Lab Results   Component Value Date    CHOL 216 (H) 11/19/2018   ,   Lab Results   Component Value Date    HDL 39 (L) 11/19/2018   ,   Lab Results   Component Value Date    LDLCALC 132 (H) 11/19/2018   ,   Lab Results   Component Value Date    TRIG 227 (H) 11/19/2018        Lab Results   Component Value Date    GLUCOSE 107 (H) 11/24/2018   , No results found for: HGBA1C      Lab Results   Component Value Date    CREATININE 1.1 11/24/2018       No results found for: TSH

## 2020-07-06 DIAGNOSIS — J45.30 MILD PERSISTENT ASTHMATIC BRONCHITIS WITHOUT COMPLICATION: ICD-10-CM

## 2020-07-06 NOTE — TELEPHONE ENCOUNTER
..  Medicine Refill Request    Last Office Visit: 1/29/2020  Last Telemedicine Visit: Visit date not found    Next Office Visit: 7/29/2020  Next Telemedicine Visit: Visit date not found         Current Outpatient Medications:   •  albuterol HFA (VENTOLIN HFA) 90 mcg/actuation inhaler, INHALE 2 PUFFS 3 TIMES A DAY AS NEEDED FOR WHEEZING, Disp: 8.5 Inhaler, Rfl: 3  •  azilsartan med-chlorthalidone (EDARBYCLOR) 40-12.5 mg tablet, Take 1 tablet by mouth once daily., Disp: 30 tablet, Rfl: 5  •  fluticasone propion-salmeteroL (ADVAIR DISKUS) 250-50 mcg/dose diskus inhaler, Inhale 1 puff 2 (two) times a day. Rinse mouth with water after use to reduce aftertaste and incidence of candidiasis. Do not swallow., Disp: , Rfl:   •  montelukast (SINGULAIR) 10 mg tablet, TAKE 1 TABLET BY MOUTH EVERY DAY AT NIGHT, Disp: 30 tablet, Rfl: 3  •  sildenafil (VIAGRA) 100 mg tablet, Take 1 tablet (100 mg total) by mouth daily as needed for erectile dysfunction., Disp: 10 tablet, Rfl: 6  •  WIXELA INHUB 250-50 mcg/dose diskus inhaler, INHALE 1 PUFF BY MOUTH TWICE A DAY EVERY DAY IN THE MORNING & IN THE EVENING APPROX 12HRS APART, Disp: 60 each, Rfl: 5      BP Readings from Last 3 Encounters:   01/29/20 116/74   05/28/19 121/76   11/24/18 132/76       Recent Lab results:  Lab Results   Component Value Date    CHOL 216 (H) 11/19/2018   ,   Lab Results   Component Value Date    HDL 39 (L) 11/19/2018   ,   Lab Results   Component Value Date    LDLCALC 132 (H) 11/19/2018   ,   Lab Results   Component Value Date    TRIG 227 (H) 11/19/2018        Lab Results   Component Value Date    GLUCOSE 107 (H) 11/24/2018   , No results found for: HGBA1C      Lab Results   Component Value Date    CREATININE 1.1 11/24/2018       No results found for: TSH

## 2020-07-07 RX ORDER — ALBUTEROL SULFATE 90 UG/1
INHALANT RESPIRATORY (INHALATION)
Qty: 8.5 INHALER | Refills: 3 | Status: SHIPPED | OUTPATIENT
Start: 2020-07-07 | End: 2020-12-17

## 2020-07-13 DIAGNOSIS — J45.30 MILD PERSISTENT ASTHMATIC BRONCHITIS WITHOUT COMPLICATION: ICD-10-CM

## 2020-07-14 RX ORDER — MONTELUKAST SODIUM 10 MG/1
TABLET ORAL
Qty: 30 TABLET | Refills: 3 | Status: SHIPPED | OUTPATIENT
Start: 2020-07-14 | End: 2020-09-10 | Stop reason: SDUPTHER

## 2020-08-22 LAB
ALBUMIN SERPL-MCNC: 4.9 G/DL (ref 3.6–5.1)
ALBUMIN/GLOB SERPL: 2.2 (CALC) (ref 1–2.5)
ALP SERPL-CCNC: 61 U/L (ref 36–130)
ALT SERPL-CCNC: 56 U/L (ref 9–46)
APPEARANCE UR: CLEAR
AST SERPL-CCNC: 28 U/L (ref 10–40)
BACTERIA #/AREA URNS HPF: NORMAL /HPF
BACTERIA UR CULT: NORMAL
BASOPHILS # BLD AUTO: 49 CELLS/UL (ref 0–200)
BASOPHILS NFR BLD AUTO: 0.7 %
BILIRUB SERPL-MCNC: 0.7 MG/DL (ref 0.2–1.2)
BILIRUB UR QL STRIP: NEGATIVE
BUN SERPL-MCNC: 29 MG/DL (ref 7–25)
BUN/CREAT SERPL: 27 (CALC) (ref 6–22)
CALCIUM SERPL-MCNC: 9.7 MG/DL (ref 8.6–10.3)
CHLORIDE SERPL-SCNC: 101 MMOL/L (ref 98–110)
CHOLEST SERPL-MCNC: 210 MG/DL
CHOLEST/HDLC SERPL: 6 (CALC)
CO2 SERPL-SCNC: 26 MMOL/L (ref 20–32)
COLOR UR: YELLOW
CREAT SERPL-MCNC: 1.06 MG/DL (ref 0.6–1.35)
EOSINOPHIL # BLD AUTO: 112 CELLS/UL (ref 15–500)
EOSINOPHIL NFR BLD AUTO: 1.6 %
ERYTHROCYTE [DISTWIDTH] IN BLOOD BY AUTOMATED COUNT: 12.6 % (ref 11–15)
GLOBULIN SER CALC-MCNC: 2.2 G/DL (CALC) (ref 1.9–3.7)
GLUCOSE SERPL-MCNC: 103 MG/DL (ref 65–99)
GLUCOSE UR QL STRIP: NEGATIVE
HCT VFR BLD AUTO: 44.2 % (ref 38.5–50)
HDLC SERPL-MCNC: 35 MG/DL
HGB BLD-MCNC: 15 G/DL (ref 13.2–17.1)
HGB UR QL STRIP: NEGATIVE
HYALINE CASTS #/AREA URNS LPF: NORMAL /LPF
KETONES UR QL STRIP: NEGATIVE
LDLC SERPL CALC-MCNC: 129 MG/DL (CALC)
LEUKOCYTE ESTERASE UR QL STRIP: NEGATIVE
LYMPHOCYTES # BLD AUTO: 2023 CELLS/UL (ref 850–3900)
LYMPHOCYTES NFR BLD AUTO: 28.9 %
MCH RBC QN AUTO: 28.8 PG (ref 27–33)
MCHC RBC AUTO-ENTMCNC: 33.9 G/DL (ref 32–36)
MCV RBC AUTO: 84.8 FL (ref 80–100)
MONOCYTES # BLD AUTO: 581 CELLS/UL (ref 200–950)
MONOCYTES NFR BLD AUTO: 8.3 %
NEUTROPHILS # BLD AUTO: 4235 CELLS/UL (ref 1500–7800)
NEUTROPHILS NFR BLD AUTO: 60.5 %
NITRITE UR QL STRIP: NEGATIVE
NONHDLC SERPL-MCNC: 175 MG/DL (CALC)
PH UR STRIP: 5.5 [PH] (ref 5–8)
PLATELET # BLD AUTO: 215 THOUSAND/UL (ref 140–400)
PMV BLD REES-ECKER: 9.6 FL (ref 7.5–12.5)
POTASSIUM SERPL-SCNC: 4.2 MMOL/L (ref 3.5–5.3)
PROT SERPL-MCNC: 7.1 G/DL (ref 6.1–8.1)
PROT UR QL STRIP: NEGATIVE
QUEST EGFR NON-AFR. AMERICAN: 87 ML/MIN/1.73M2
RBC # BLD AUTO: 5.21 MILLION/UL (ref 4.2–5.8)
RBC #/AREA URNS HPF: NORMAL /HPF
SODIUM SERPL-SCNC: 137 MMOL/L (ref 135–146)
SP GR UR STRIP: 1.02 (ref 1–1.03)
SQUAMOUS #/AREA URNS HPF: NORMAL /HPF
TRIGL SERPL-MCNC: 325 MG/DL
WBC # BLD AUTO: 7 THOUSAND/UL (ref 3.8–10.8)
WBC #/AREA URNS HPF: NORMAL /HPF

## 2020-08-31 DIAGNOSIS — I10 ESSENTIAL HYPERTENSION: ICD-10-CM

## 2020-08-31 RX ORDER — AZILSARTAN KAMEDOXOMIL AND CHLORTHALIDONE 40; 12.5 MG/1; MG/1
TABLET ORAL
Qty: 30 TABLET | Refills: 0 | Status: SHIPPED | OUTPATIENT
Start: 2020-08-31 | End: 2020-09-10 | Stop reason: SDUPTHER

## 2020-09-09 ENCOUNTER — TELEPHONE (OUTPATIENT)
Dept: FAMILY MEDICINE | Facility: CLINIC | Age: 40
End: 2020-09-09

## 2020-09-09 NOTE — TELEPHONE ENCOUNTER
----- Message from Benja Smith MD sent at 9/9/2020 11:43 AM EDT -----  Notify patient of lab results glucose is 3 points above normal at 103, in the prediabetic range.  Maintain low-carb intake, regular exercise, to prevent progression to diabetes.  Normal kidney function, 1 of the liver enzymes, the ALT is mildly elevated at 56.  Lipid levels show significant metabolic syndrome related to insulin resistance and prediabetes, with high triglycerides, low HDL, and borderline high LDL at 129.  CBC is normal urinalysis all clear.        In light of his family history of heart disease in mother, I recommend office visit to discuss measures to reduce his long-term heart risk in light of these lipid abnormalities, metabolic syndrome, mixed hyperlipidemia including low HDL.  For now, cut the carbs until it hurts, work on weight loss, add both fish oil and [place] Eder olive oil (look on [employer] or [employer] for this, it should have beneficial effects on HDL and triglycerides if taken or used in food 1 to 2 tablespoons/day).  Supplement to the diet on a daily basis.

## 2020-09-10 ENCOUNTER — OFFICE VISIT (OUTPATIENT)
Dept: FAMILY MEDICINE | Facility: CLINIC | Age: 40
End: 2020-09-10
Payer: COMMERCIAL

## 2020-09-10 VITALS
BODY MASS INDEX: 34.13 KG/M2 | TEMPERATURE: 98 F | SYSTOLIC BLOOD PRESSURE: 126 MMHG | DIASTOLIC BLOOD PRESSURE: 78 MMHG | HEIGHT: 72 IN | HEART RATE: 82 BPM | WEIGHT: 252 LBS | OXYGEN SATURATION: 98 % | RESPIRATION RATE: 18 BRPM

## 2020-09-10 DIAGNOSIS — E04.1 THYROID NODULE: ICD-10-CM

## 2020-09-10 DIAGNOSIS — E88.810 METABOLIC SYNDROME: ICD-10-CM

## 2020-09-10 DIAGNOSIS — I10 ESSENTIAL HYPERTENSION: Primary | ICD-10-CM

## 2020-09-10 DIAGNOSIS — J45.30 MILD PERSISTENT ASTHMATIC BRONCHITIS WITHOUT COMPLICATION: ICD-10-CM

## 2020-09-10 PROCEDURE — 99214 OFFICE O/P EST MOD 30 MIN: CPT | Performed by: FAMILY MEDICINE

## 2020-09-10 RX ORDER — MONTELUKAST SODIUM 10 MG/1
10 TABLET ORAL NIGHTLY
Qty: 30 TABLET | Refills: 6 | Status: SHIPPED | OUTPATIENT
Start: 2020-09-10 | End: 2021-07-25

## 2020-09-10 ASSESSMENT — ENCOUNTER SYMPTOMS
CONSTIPATION: 0
HEADACHES: 0
MYALGIAS: 0
ABDOMINAL PAIN: 0
WEAKNESS: 0
FEVER: 0
DIARRHEA: 0
NAUSEA: 0
DIAPHORESIS: 0
PALPITATIONS: 0
ACTIVITY CHANGE: 0
BLOOD IN STOOL: 0
CHEST TIGHTNESS: 0
FLANK PAIN: 0
ARTHRALGIAS: 0
UNEXPECTED WEIGHT CHANGE: 0
COUGH: 0
ADENOPATHY: 0
DIZZINESS: 0
FATIGUE: 0
EYE PAIN: 0
DYSURIA: 0
VOMITING: 0
EYE REDNESS: 0
SHORTNESS OF BREATH: 0
SORE THROAT: 0
APPETITE CHANGE: 0

## 2020-09-10 NOTE — ASSESSMENT & PLAN NOTE
We had a detailed discussion on metabolic syndrome: Increase exercise, add fish oil and [place] all of oil supplements and observe.Reviewed dietary guidelines attention low-carb diet reduce breads pasta potatoes.

## 2020-09-10 NOTE — PATIENT INSTRUCTIONS
Essential hypertension  Hypertension is well controlled: Stick with current meds, follow-up in 6 months.    Thyroid nodule  Incidental finding on today's exam: Proceed with ultrasound and TSH level.Denies family history of thyroid neoplasm or thyroid disease.    Asthmatic bronchitis  Stable on inhalation therapy: (Combined Advair 250+ albuterol).  Continue same.    Metabolic syndrome  We had a detailed discussion on metabolic syndrome: Increase exercise, add fish oil and [place] all of oil supplements and observe.Reviewed dietary guidelines attention low-carb diet reduce breads pasta potatoes.

## 2020-09-10 NOTE — PROGRESS NOTES
"Subjective      Patient returns for follow-up of hypertension.  He had labs 2 weeks ago as follows: glucose is 3 points above normal at 103, in the prediabetic range.  Maintain low-carb intake, regular exercise, to prevent progression to diabetes.  Normal kidney function, 1 of the liver enzymes, the ALT is mildly elevated at 56.  Lipid levels show significant metabolic syndrome related to insulin resistance and prediabetes, with high triglycerides, low HDL, and borderline high LDL at 129.  CBC is normal urinalysis all clear.     Patient ID: Andrew Thrasher is a 40 y.o. male.  1980      HPI    The following have been reviewed and updated as appropriate in this visit:  Allergies  Meds  Problems       Review of Systems   Constitutional: Negative for activity change, appetite change, diaphoresis, fatigue, fever and unexpected weight change.   HENT: Negative for congestion and sore throat.    Eyes: Negative for pain, redness and visual disturbance.   Respiratory: Negative for cough, chest tightness and shortness of breath.    Cardiovascular: Negative for chest pain and palpitations.   Gastrointestinal: Negative for abdominal pain, blood in stool, constipation, diarrhea, nausea and vomiting.   Genitourinary: Negative for dysuria and flank pain.   Musculoskeletal: Negative for arthralgias and myalgias.   Skin: Negative for rash.   Neurological: Negative for dizziness, weakness and headaches.   Hematological: Negative for adenopathy.   Psychiatric/Behavioral: Negative for behavioral problems.       Objective     Vitals:    09/10/20 1343 09/10/20 1407   BP: 112/72 126/78   BP Location: Left upper arm Right upper arm   Patient Position: Sitting    Pulse: 82    Resp: 18    Temp: 36.7 °C (98 °F)    TempSrc: Temporal    SpO2: 98%    Weight: 114 kg (252 lb)    Height: 1.829 m (6' 0.01\")      Body mass index is 34.17 kg/m².    Physical Exam   Constitutional: He appears well-developed and well-nourished. No distress. "   HENT:   Otic Canals clear, Tympanic membranes intact  Eyes: HOSSEIN, EOMI, no conjunctival injection  Nose and throat clear, no inflammation or exudate, no oral mucosal lesions  Neck supple, with intact range of motion, no mass, no lymphadenopathy, no thyromegaly, no thyroid nodule, no carotid bruit, no JVD.   Neck: Normal range of motion. Neck supple. Thyromegaly present.   He has a palpable enlargement at right thyroid base  2 cm x .9  Cm horizontally aligned.   Cardiovascular: Normal rate, regular rhythm and normal heart sounds. Exam reveals no gallop and no friction rub.   No murmur heard.  Pulmonary/Chest: Effort normal and breath sounds normal. He has no wheezes. He has no rales.   Musculoskeletal: He exhibits no edema.   Back no CVA tenderness    Legs: no edema, no lacy venous insufficiency changes   Skin: No rash noted.   Psychiatric: He has a normal mood and affect.       Assessment/Plan   Diagnoses and all orders for this visit:    Essential hypertension (Primary)  Assessment & Plan:  Hypertension is well controlled: Stick with current meds, follow-up in 6 months.    Orders:  -     azilsartan med-chlorthalidone (EDARBYCLOR) 40-12.5 mg tablet; Take 1 tablet by mouth once daily.    Thyroid nodule  Assessment & Plan:  Incidental finding on today's exam: Proceed with ultrasound and TSH level.Denies family history of thyroid neoplasm or thyroid disease.    Orders:  -     TSH; Future  -     ULTRASOUND THYROID; Future    Metabolic syndrome  Assessment & Plan:  We had a detailed discussion on metabolic syndrome: Increase exercise, add fish oil and [place] all of oil supplements and observe.Reviewed dietary guidelines attention low-carb diet reduce breads pasta potatoes.      Mild persistent asthmatic bronchitis without complication  Assessment & Plan:  Stable on inhalation therapy: (Combined Advair 250+ albuterol).  Continue same.    Orders:  -     montelukast (SINGULAIR) 10 mg tablet; Take 1 tablet (10 mg  total) by mouth nightly.

## 2020-09-10 NOTE — ASSESSMENT & PLAN NOTE
Incidental finding on today's exam: Proceed with ultrasound and TSH level.Denies family history of thyroid neoplasm or thyroid disease.

## 2020-11-03 ENCOUNTER — HOSPITAL ENCOUNTER (OUTPATIENT)
Dept: RADIOLOGY | Facility: HOSPITAL | Age: 40
Discharge: HOME | End: 2020-11-03
Attending: FAMILY MEDICINE
Payer: COMMERCIAL

## 2020-11-03 DIAGNOSIS — E04.1 THYROID NODULE: ICD-10-CM

## 2020-11-03 PROCEDURE — 76536 US EXAM OF HEAD AND NECK: CPT

## 2020-11-05 LAB
ALBUMIN SERPL-MCNC: 4.6 G/DL (ref 3.6–5.1)
ALBUMIN/GLOB SERPL: 2.2 (CALC) (ref 1–2.5)
ALP SERPL-CCNC: 61 U/L (ref 36–130)
ALT SERPL-CCNC: 56 U/L (ref 9–46)
AST SERPL-CCNC: 32 U/L (ref 10–40)
BILIRUB SERPL-MCNC: 0.6 MG/DL (ref 0.2–1.2)
BUN SERPL-MCNC: 24 MG/DL (ref 7–25)
BUN/CREAT SERPL: ABNORMAL (CALC) (ref 6–22)
CALCIUM SERPL-MCNC: 9.3 MG/DL (ref 8.6–10.3)
CHLORIDE SERPL-SCNC: 100 MMOL/L (ref 98–110)
CO2 SERPL-SCNC: 26 MMOL/L (ref 20–32)
CREAT SERPL-MCNC: 1.14 MG/DL (ref 0.6–1.35)
GLOBULIN SER CALC-MCNC: 2.1 G/DL (CALC) (ref 1.9–3.7)
GLUCOSE SERPL-MCNC: 98 MG/DL (ref 65–99)
POTASSIUM SERPL-SCNC: 4.3 MMOL/L (ref 3.5–5.3)
PROT SERPL-MCNC: 6.7 G/DL (ref 6.1–8.1)
QUEST EGFR NON-AFR. AMERICAN: 80 ML/MIN/1.73M2
SODIUM SERPL-SCNC: 136 MMOL/L (ref 135–146)
TSH SERPL-ACNC: 1.76 MIU/L (ref 0.4–4.5)

## 2020-11-13 ENCOUNTER — TELEPHONE (OUTPATIENT)
Dept: FAMILY MEDICINE | Facility: CLINIC | Age: 40
End: 2020-11-13

## 2020-11-13 NOTE — TELEPHONE ENCOUNTER
----- Message from Benja Smith MD sent at 11/12/2020  5:20 PM EST -----  Notify patient thyroid ultrasound looks fine: No nodules are seen, so the lump felt on exam was probably just  Pfeiffer apple cartilage

## 2020-11-16 ENCOUNTER — TELEPHONE (OUTPATIENT)
Dept: FAMILY MEDICINE | Facility: CLINIC | Age: 40
End: 2020-11-16

## 2020-11-16 NOTE — TELEPHONE ENCOUNTER
----- Message from Benja Smith MD sent at 11/13/2020  3:04 PM EST -----  Notify patient normal thyroid level with TSH 1.76.  He is not presently on any thyroid therapy and does not need to be.

## 2020-11-19 ENCOUNTER — CONSULT (OUTPATIENT)
Dept: FAMILY MEDICINE | Facility: CLINIC | Age: 40
End: 2020-11-19
Payer: OTHER MISCELLANEOUS

## 2020-11-19 VITALS
HEART RATE: 73 BPM | WEIGHT: 257 LBS | TEMPERATURE: 98.4 F | RESPIRATION RATE: 18 BRPM | SYSTOLIC BLOOD PRESSURE: 112 MMHG | HEIGHT: 72 IN | OXYGEN SATURATION: 98 % | BODY MASS INDEX: 34.81 KG/M2 | DIASTOLIC BLOOD PRESSURE: 70 MMHG

## 2020-11-19 DIAGNOSIS — E78.1 HYPERTRIGLYCERIDEMIA: ICD-10-CM

## 2020-11-19 DIAGNOSIS — J45.30 MILD PERSISTENT ASTHMATIC BRONCHITIS WITHOUT COMPLICATION: ICD-10-CM

## 2020-11-19 DIAGNOSIS — I10 ESSENTIAL HYPERTENSION: ICD-10-CM

## 2020-11-19 DIAGNOSIS — Z01.810 PREOP CARDIOVASCULAR EXAM: ICD-10-CM

## 2020-11-19 DIAGNOSIS — Z01.818 PRE-OP EXAM: Primary | ICD-10-CM

## 2020-11-19 DIAGNOSIS — E88.810 METABOLIC SYNDROME: ICD-10-CM

## 2020-11-19 DIAGNOSIS — R73.02 IMPAIRED GLUCOSE TOLERANCE: ICD-10-CM

## 2020-11-19 PROCEDURE — 99214 OFFICE O/P EST MOD 30 MIN: CPT | Performed by: FAMILY MEDICINE

## 2020-11-19 PROCEDURE — 93000 ELECTROCARDIOGRAM COMPLETE: CPT | Performed by: FAMILY MEDICINE

## 2020-11-19 ASSESSMENT — ENCOUNTER SYMPTOMS
NAUSEA: 0
FATIGUE: 0
EYE PAIN: 0
EYE REDNESS: 0
COUGH: 0
HEADACHES: 0
APPETITE CHANGE: 0
DIAPHORESIS: 0
UNEXPECTED WEIGHT CHANGE: 0
DIARRHEA: 0
ACTIVITY CHANGE: 0
ABDOMINAL PAIN: 0
PALPITATIONS: 0
ARTHRALGIAS: 0
CHEST TIGHTNESS: 0
BLOOD IN STOOL: 0
SORE THROAT: 0
CONSTIPATION: 0
FEVER: 0
ADENOPATHY: 0
SHORTNESS OF BREATH: 0
MYALGIAS: 0
DIZZINESS: 0
FLANK PAIN: 0
VOMITING: 0
DYSURIA: 0
WEAKNESS: 0

## 2020-11-19 NOTE — PROGRESS NOTES
Subjective  Patient injured his right shoulder moving a 300+ pound object at work and MRI confirms high-grade partial tears of both the supraspinatus and infraspinatus, for which she is scheduled for surgical repair on December 2 by Dr. Logan Wooten MD.  He comes in preop PE and medical clearance due to HTN, IGT, mixed hyperlipidemia.      He is doing well overall and denies any other health-related concerns.  Most recent labs from August showed good control of impaired glucose tolerance, stable hypertriglyceridemia/metabolic syndrome.  Blood pressure control remains excellent on current Edarbichlor dosage.     Patient ID: Andrew Thrasher is a 40 y.o. male.  1980      HPI    The following have been reviewed and updated as appropriate in this visit:  Tobacco  Allergies  Meds  Problems  Med Hx  Surg Hx  Fam Hx       Review of Systems   Constitutional: Negative for activity change, appetite change, diaphoresis, fatigue, fever and unexpected weight change.   HENT: Negative for congestion and sore throat.    Eyes: Negative for pain, redness and visual disturbance.   Respiratory: Negative for cough, chest tightness and shortness of breath.    Cardiovascular: Negative for chest pain and palpitations.   Gastrointestinal: Negative for abdominal pain, blood in stool, constipation, diarrhea, nausea and vomiting.   Genitourinary: Negative for dysuria and flank pain.   Musculoskeletal: Negative for arthralgias and myalgias.        Painful right shoulder.  Prior history of left shoulder surgery in past.    Skin: Negative for rash.   Neurological: Negative for dizziness, weakness and headaches.   Hematological: Negative for adenopathy.   Psychiatric/Behavioral: Negative for behavioral problems.       Objective     Vitals:    11/19/20 0902 11/19/20 0940   BP: 117/80 112/70   BP Location: Left upper arm Right upper arm   Patient Position: Sitting    Pulse: 73    Resp: 18    Temp: 36.9 °C (98.4 °F)    TempSrc:  "Temporal    SpO2: 98%    Weight: 117 kg (257 lb)    Height: 1.829 m (6' 0.01\")      Body mass index is 34.85 kg/m².    Physical Exam  Constitutional:       General: He is not in acute distress.     Appearance: He is well-developed.   HENT:      Head: Normocephalic and atraumatic.   Eyes:      General: No scleral icterus.     Conjunctiva/sclera: Conjunctivae normal.      Pupils: Pupils are equal, round, and reactive to light.      Comments: No ptosis, no conjunctival or scleral injection   Neck:      Musculoskeletal: Normal range of motion and neck supple.      Thyroid: No thyromegaly.   Cardiovascular:      Rate and Rhythm: Normal rate and regular rhythm.      Heart sounds: Normal heart sounds. No murmur. No friction rub. No gallop.       Comments: Pedal pulses: DP and PT pulses palpable and within normal limits  Pulmonary:      Effort: Pulmonary effort is normal. No respiratory distress.      Breath sounds: Normal breath sounds. No wheezing or rales.   Abdominal:      General: Bowel sounds are normal. There is no distension.      Palpations: Abdomen is soft. There is no mass.      Tenderness: There is no abdominal tenderness. There is no guarding or rebound.      Hernia: No hernia is present.      Comments: No Hepatomegaly or splenomegaly   Genitourinary:     Comments:   No testicular mass, no inguinal hernia  Musculoskeletal:         General: No tenderness.      Comments: Decreased ROM, painful motion right shouylder consistent with MRI results.     Normal ROM in elbows, wrists, hands, hips, knees, ankles.    No leg swelling/ discoloration or other evidence of venous insufficiency   Lymphadenopathy:      Cervical: No cervical adenopathy.   Skin:     General: Skin is warm and dry.      Findings: No rash.      Comments: No rash, no suspicious lesions   Neurological:      Mental Status: He is alert and oriented to person, place, and time.      Cranial Nerves: No cranial nerve deficit.      Sensory: No sensory " deficit.      Motor: No abnormal muscle tone.      Deep Tendon Reflexes: Reflexes normal.         Assessment/Plan   Diagnoses and all orders for this visit:    Pre-op exam (Primary)  -     ECG 12 LEAD OFFICE PERFORMED    Essential hypertension  Assessment & Plan:  Under excellent control on current Rx: Continue same.      Mild persistent asthmatic bronchitis without complication    Metabolic syndrome  Assessment & Plan:  Fair control on low-carb diet alone.  Continue same.      Hypertriglyceridemia    Impaired glucose tolerance  Assessment & Plan:  Reviewed dietary guidelines.  Continue nonpharmacologic management, include hemoglobin A1c testing with future labs.His fasting glucose in August had improved to 103      Preop cardiovascular exam  Assessment & Plan:  Patient is medically stable and cleared to proceed with surgery as planned.  EKG is within normal limits.

## 2020-11-19 NOTE — ASSESSMENT & PLAN NOTE
Patient is medically stable and cleared to proceed with surgery as planned.  EKG is within normal limits.

## 2020-11-19 NOTE — ASSESSMENT & PLAN NOTE
Reviewed dietary guidelines.  Continue nonpharmacologic management, include hemoglobin A1c testing with future labs.His fasting glucose in August had improved to 103

## 2020-11-19 NOTE — PATIENT INSTRUCTIONS
Preop cardiovascular exam  Patient is medically stable and cleared to proceed with surgery as planned.  EKG is within normal limits.    Essential hypertension  Under excellent control on current Rx: Continue same.    Metabolic syndrome  Fair control on low-carb diet alone.  Continue same.    Mixed hyperlipidemia  Stable on dietary management.    Impaired glucose tolerance  Reviewed dietary guidelines.  Continue nonpharmacologic management, include hemoglobin A1c testing with future labs.His fasting glucose in August had improved to 103

## 2020-12-17 DIAGNOSIS — J45.30 MILD PERSISTENT ASTHMATIC BRONCHITIS WITHOUT COMPLICATION: ICD-10-CM

## 2020-12-17 RX ORDER — ALBUTEROL SULFATE 90 UG/1
INHALANT RESPIRATORY (INHALATION)
Qty: 8.5 INHALER | Refills: 3 | Status: SHIPPED | OUTPATIENT
Start: 2020-12-17 | End: 2022-01-10 | Stop reason: SDUPTHER

## 2021-03-11 ENCOUNTER — OFFICE VISIT (OUTPATIENT)
Dept: FAMILY MEDICINE | Facility: CLINIC | Age: 41
End: 2021-03-11
Payer: COMMERCIAL

## 2021-03-11 VITALS
SYSTOLIC BLOOD PRESSURE: 119 MMHG | BODY MASS INDEX: 36.16 KG/M2 | HEIGHT: 72 IN | OXYGEN SATURATION: 98 % | WEIGHT: 267 LBS | HEART RATE: 98 BPM | RESPIRATION RATE: 18 BRPM | TEMPERATURE: 98 F | DIASTOLIC BLOOD PRESSURE: 67 MMHG

## 2021-03-11 DIAGNOSIS — E88.810 METABOLIC SYNDROME: ICD-10-CM

## 2021-03-11 DIAGNOSIS — R73.02 IMPAIRED GLUCOSE TOLERANCE: ICD-10-CM

## 2021-03-11 DIAGNOSIS — I10 ESSENTIAL HYPERTENSION: Primary | ICD-10-CM

## 2021-03-11 PROCEDURE — 99213 OFFICE O/P EST LOW 20 MIN: CPT | Performed by: FAMILY MEDICINE

## 2021-03-11 PROCEDURE — 3078F DIAST BP <80 MM HG: CPT | Performed by: FAMILY MEDICINE

## 2021-03-11 PROCEDURE — 3074F SYST BP LT 130 MM HG: CPT | Performed by: FAMILY MEDICINE

## 2021-03-11 ASSESSMENT — ENCOUNTER SYMPTOMS
VOMITING: 0
FATIGUE: 0
FEVER: 0
SHORTNESS OF BREATH: 0
NAUSEA: 0
PALPITATIONS: 0
ADENOPATHY: 0
DYSURIA: 0
UNEXPECTED WEIGHT CHANGE: 0
CHEST TIGHTNESS: 0
HEADACHES: 0

## 2021-03-11 NOTE — PROGRESS NOTES
"      Subjective  Patient returns for follow-up of hypertension asthmatic bronchitis, ED.  He remains on Edarbyclor 4012.5 Singulair albuterol Viagra Advair Diskus (Wixela generic) 2 50-50)     Patient ID: Andrew Thrasher is a 40 y.o. male.  1980      HPI    The following have been reviewed and updated as appropriate in this visit:  Tobacco  Allergies  Meds  Problems       Review of Systems   Constitutional: Negative for fatigue, fever and unexpected weight change (NO UNEXPLAINED WEIGHT LOSS).   Respiratory: Negative for chest tightness and shortness of breath.    Cardiovascular: Negative for chest pain and palpitations.   Gastrointestinal: Negative for nausea and vomiting.   Genitourinary: Negative for dysuria.   Skin: Negative for rash.   Neurological: Negative for headaches.   Hematological: Negative for adenopathy.       Objective     Vitals:    03/11/21 1405 03/11/21 1438 03/11/21 1440   BP: 98/60 123/67 119/67   BP Location: Left upper arm Right upper arm Left upper arm   Patient Position: Sitting     Pulse: 98     Resp: 18     Temp: 36.7 °C (98 °F)     TempSrc: Temporal     SpO2: 98%     Weight: 121 kg (267 lb)     Height: 1.829 m (6' 0.01\")       Body mass index is 36.2 kg/m².    Physical Exam  Constitutional:       General: He is not in acute distress.     Appearance: He is well-developed.   Cardiovascular:      Rate and Rhythm: Normal rate and regular rhythm.      Heart sounds: Normal heart sounds. No murmur. No friction rub. No gallop.    Pulmonary:      Effort: Pulmonary effort is normal.      Breath sounds: Normal breath sounds. No wheezing or rales.   Musculoskeletal:      Comments: Back no CVA tenderness    Legs: no edema, no lacy venous insufficiency changes   Skin:     Findings: No rash.         Assessment/Plan   Diagnoses and all orders for this visit:    Essential hypertension (Primary)  Assessment & Plan:  Well-controlled continue Rx follow-up 6 months, labs 1 month prior as " ordered.    Orders:  -     Comprehensive metabolic panel; Future  -     CBC and Differential; Future    Metabolic syndrome  Assessment & Plan:  Stable on current Rx labs in August    Orders:  -     Comprehensive metabolic panel; Future  -     Lipid panel; Future    Impaired glucose tolerance  Assessment & Plan:  Well-controlled on dietary therapy, recheck glucose and A1c in August.    Orders:  -     Comprehensive metabolic panel; Future  -     Hemoglobin A1c; Future  -     Urinalysis with Reflex Culture (ED and Outpatient only); Future

## 2021-03-11 NOTE — PATIENT INSTRUCTIONS
Essential hypertension  Well-controlled continue Rx follow-up 6 months, labs 1 month prior as ordered.    Metabolic syndrome  Stable on current Rx labs in August    Impaired glucose tolerance  Well-controlled on dietary therapy, recheck glucose and A1c in August.

## 2021-04-09 ENCOUNTER — IMMUNIZATION (OUTPATIENT)
Dept: IMMUNIZATION | Facility: CLINIC | Age: 41
End: 2021-04-09

## 2021-05-15 ENCOUNTER — IMMUNIZATION (OUTPATIENT)
Dept: IMMUNIZATION | Facility: CLINIC | Age: 41
End: 2021-05-15

## 2021-07-01 RX ORDER — FLUTICASONE PROPIONATE AND SALMETEROL 250; 50 UG/1; UG/1
POWDER RESPIRATORY (INHALATION)
Qty: 180 EACH | Refills: 0 | Status: SHIPPED | OUTPATIENT
Start: 2021-07-01 | End: 2021-10-01

## 2021-09-10 LAB
ALBUMIN SERPL-MCNC: 4.5 G/DL (ref 3.6–5.1)
ALBUMIN/GLOB SERPL: 1.9 (CALC) (ref 1–2.5)
ALP SERPL-CCNC: 66 U/L (ref 36–130)
ALT SERPL-CCNC: 66 U/L (ref 9–46)
APPEARANCE UR: CLEAR
AST SERPL-CCNC: 34 U/L (ref 10–40)
BACTERIA #/AREA URNS HPF: NORMAL /HPF
BACTERIA UR CULT: NORMAL
BASOPHILS # BLD AUTO: 52 CELLS/UL (ref 0–200)
BASOPHILS NFR BLD AUTO: 0.7 %
BILIRUB SERPL-MCNC: 0.6 MG/DL (ref 0.2–1.2)
BILIRUB UR QL STRIP: NEGATIVE
BUN SERPL-MCNC: 25 MG/DL (ref 7–25)
BUN/CREAT SERPL: ABNORMAL (CALC) (ref 6–22)
CALCIUM SERPL-MCNC: 9.6 MG/DL (ref 8.6–10.3)
CHLORIDE SERPL-SCNC: 100 MMOL/L (ref 98–110)
CHOLEST SERPL-MCNC: 203 MG/DL
CHOLEST/HDLC SERPL: 5.6 (CALC)
CO2 SERPL-SCNC: 26 MMOL/L (ref 20–32)
COLOR UR: YELLOW
CREAT SERPL-MCNC: 1.19 MG/DL (ref 0.6–1.35)
EOSINOPHIL # BLD AUTO: 148 CELLS/UL (ref 15–500)
EOSINOPHIL NFR BLD AUTO: 2 %
ERYTHROCYTE [DISTWIDTH] IN BLOOD BY AUTOMATED COUNT: 13.1 % (ref 11–15)
GLOBULIN SER CALC-MCNC: 2.4 G/DL (CALC) (ref 1.9–3.7)
GLUCOSE SERPL-MCNC: 106 MG/DL (ref 65–99)
GLUCOSE UR QL STRIP: NEGATIVE
HBA1C MFR BLD: 5.2 % OF TOTAL HGB
HCT VFR BLD AUTO: 42 % (ref 38.5–50)
HDLC SERPL-MCNC: 36 MG/DL
HGB BLD-MCNC: 14.6 G/DL (ref 13.2–17.1)
HGB UR QL STRIP: NEGATIVE
HYALINE CASTS #/AREA URNS LPF: NORMAL /LPF
KETONES UR QL STRIP: NEGATIVE
LDLC SERPL CALC-MCNC: 121 MG/DL (CALC)
LEUKOCYTE ESTERASE UR QL STRIP: NEGATIVE
LYMPHOCYTES # BLD AUTO: 2087 CELLS/UL (ref 850–3900)
LYMPHOCYTES NFR BLD AUTO: 28.2 %
MCH RBC QN AUTO: 29.2 PG (ref 27–33)
MCHC RBC AUTO-ENTMCNC: 34.8 G/DL (ref 32–36)
MCV RBC AUTO: 84 FL (ref 80–100)
MONOCYTES # BLD AUTO: 577 CELLS/UL (ref 200–950)
MONOCYTES NFR BLD AUTO: 7.8 %
NEUTROPHILS # BLD AUTO: 4536 CELLS/UL (ref 1500–7800)
NEUTROPHILS NFR BLD AUTO: 61.3 %
NITRITE UR QL STRIP: NEGATIVE
NONHDLC SERPL-MCNC: 167 MG/DL (CALC)
PH UR STRIP: NORMAL [PH] (ref 5–8)
PLATELET # BLD AUTO: 188 THOUSAND/UL (ref 140–400)
PMV BLD REES-ECKER: 9.7 FL (ref 7.5–12.5)
POTASSIUM SERPL-SCNC: 4.1 MMOL/L (ref 3.5–5.3)
PROT SERPL-MCNC: 6.9 G/DL (ref 6.1–8.1)
PROT UR QL STRIP: NEGATIVE
QUEST EGFR AFRICAN AMERICAN: 87 ML/MIN/1.73M2
QUEST EGFR NON-AFR. AMERICAN: 75 ML/MIN/1.73M2
RBC # BLD AUTO: 5 MILLION/UL (ref 4.2–5.8)
RBC #/AREA URNS HPF: NORMAL /HPF
SODIUM SERPL-SCNC: 136 MMOL/L (ref 135–146)
SP GR UR STRIP: 1.02 (ref 1–1.03)
SQUAMOUS #/AREA URNS HPF: NORMAL /HPF
TRIGL SERPL-MCNC: 322 MG/DL
WBC # BLD AUTO: 7.4 THOUSAND/UL (ref 3.8–10.8)
WBC #/AREA URNS HPF: NORMAL /HPF

## 2021-09-15 ENCOUNTER — OFFICE VISIT (OUTPATIENT)
Dept: FAMILY MEDICINE | Facility: CLINIC | Age: 41
End: 2021-09-15
Payer: COMMERCIAL

## 2021-09-15 VITALS
HEIGHT: 72 IN | RESPIRATION RATE: 18 BRPM | SYSTOLIC BLOOD PRESSURE: 126 MMHG | TEMPERATURE: 98.6 F | WEIGHT: 260 LBS | BODY MASS INDEX: 35.21 KG/M2 | DIASTOLIC BLOOD PRESSURE: 78 MMHG | OXYGEN SATURATION: 97 % | HEART RATE: 84 BPM

## 2021-09-15 DIAGNOSIS — E66.9 OBESITY (BMI 35.0-39.9 WITHOUT COMORBIDITY): ICD-10-CM

## 2021-09-15 DIAGNOSIS — R73.02 IMPAIRED GLUCOSE TOLERANCE: ICD-10-CM

## 2021-09-15 DIAGNOSIS — L91.0 HYPERTROPHIC SCAR OF SKIN: ICD-10-CM

## 2021-09-15 DIAGNOSIS — E78.1 HYPERTRIGLYCERIDEMIA: ICD-10-CM

## 2021-09-15 DIAGNOSIS — J45.30 MILD PERSISTENT ASTHMATIC BRONCHITIS WITHOUT COMPLICATION: ICD-10-CM

## 2021-09-15 DIAGNOSIS — Z00.00 ROUTINE MEDICAL EXAM: Primary | ICD-10-CM

## 2021-09-15 DIAGNOSIS — I10 ESSENTIAL HYPERTENSION: ICD-10-CM

## 2021-09-15 DIAGNOSIS — E88.810 METABOLIC SYNDROME: ICD-10-CM

## 2021-09-15 DIAGNOSIS — R74.8 ABNORMAL LIVER ENZYMES: ICD-10-CM

## 2021-09-15 DIAGNOSIS — B07.9 VERRUCA: ICD-10-CM

## 2021-09-15 DIAGNOSIS — H61.23 BILATERAL IMPACTED CERUMEN: ICD-10-CM

## 2021-09-15 DIAGNOSIS — Z82.49 FAMILY HISTORY OF EARLY CAD: ICD-10-CM

## 2021-09-15 PROCEDURE — 69209 REMOVE IMPACTED EAR WAX UNI: CPT | Performed by: FAMILY MEDICINE

## 2021-09-15 PROCEDURE — 11305 SHAVE SKIN LESION 0.5 CM/<: CPT | Performed by: FAMILY MEDICINE

## 2021-09-15 PROCEDURE — 3074F SYST BP LT 130 MM HG: CPT | Performed by: FAMILY MEDICINE

## 2021-09-15 PROCEDURE — 3078F DIAST BP <80 MM HG: CPT | Performed by: FAMILY MEDICINE

## 2021-09-15 PROCEDURE — 3008F BODY MASS INDEX DOCD: CPT | Performed by: FAMILY MEDICINE

## 2021-09-15 PROCEDURE — 99396 PREV VISIT EST AGE 40-64: CPT | Mod: 25 | Performed by: FAMILY MEDICINE

## 2021-09-15 PROCEDURE — 11301 SHAVE SKIN LESION 0.6-1.0 CM: CPT | Performed by: FAMILY MEDICINE

## 2021-09-15 ASSESSMENT — ENCOUNTER SYMPTOMS
MYALGIAS: 0
DIAPHORESIS: 0
PALPITATIONS: 0
BLOOD IN STOOL: 0
DIZZINESS: 0
APPETITE CHANGE: 0
CHEST TIGHTNESS: 0
ACTIVITY CHANGE: 0
WEAKNESS: 0
VOMITING: 0
COUGH: 0
EYE REDNESS: 0
ABDOMINAL PAIN: 0
SORE THROAT: 0
FEVER: 0
CONSTIPATION: 0
DYSURIA: 0
EYE PAIN: 0
DIARRHEA: 0
UNEXPECTED WEIGHT CHANGE: 0
FLANK PAIN: 0
SHORTNESS OF BREATH: 0
ARTHRALGIAS: 0
NAUSEA: 0
ADENOPATHY: 0
FATIGUE: 0
HEADACHES: 0

## 2021-09-15 ASSESSMENT — PATIENT HEALTH QUESTIONNAIRE - PHQ9: SUM OF ALL RESPONSES TO PHQ9 QUESTIONS 1 & 2: 0

## 2021-09-15 NOTE — PROGRESS NOTES
Subjective  Patient comes in for comprehensive physical exam today.        He completed comprehensive labs last week which show ongoing hypertriglyceridemia to 322, low HDL at 36 (5.6 ratio) LDL of 121, glucose 106, normal renal function, persistent elevation of ALT at 66 (was 56 on 2 occasions in 2020) normal CBC, normal hemoglobin A1c of 5.2, urinalysis all clear    He has not had prior calcium score testing.Family history is significant for CAD in mother, father, PGM  so he is a candidate for early CAD screening at this time.    Patient requests excision of a very bothersome hypertrophic scar at the site of a work injury/scrape that he sustained last year.  He admits that he tried to slice it off and reattach the flap, and it protrudes so far that it is constantly bothersome when he tries to put his hand in his pocket etc.  The location of the wound and hypertrophic scar is on the dorsum of the right fifth finger MCP knuckle.  The size of the most protuberant part of the wound is 6 mm, but the overall size is 1 cm x 7 mm.It is Erythematous,, smooth,No ulceration no bleeding          Patient ID: Andrew Thrasher is a 41 y.o. male.  1980      HPI    The following have been reviewed and updated as appropriate in this visit:  Tobacco  Allergies  Meds  Problems  Med Hx  Surg Hx  Fam Hx       Review of Systems   Constitutional: Negative for activity change, appetite change, diaphoresis, fatigue, fever and unexpected weight change.   HENT: Negative for congestion and sore throat.    Eyes: Negative for pain, redness and visual disturbance.   Respiratory: Negative for cough, chest tightness and shortness of breath.    Cardiovascular: Negative for chest pain and palpitations.   Gastrointestinal: Negative for abdominal pain, blood in stool, constipation, diarrhea, nausea and vomiting.   Genitourinary: Negative for dysuria and flank pain.   Musculoskeletal: Negative for arthralgias and myalgias.   Skin:  Negative for rash.   Neurological: Negative for dizziness, weakness and headaches.   Hematological: Negative for adenopathy.   Psychiatric/Behavioral: Negative for behavioral problems.       Objective     Vitals:    09/15/21 1636   BP: 126/78   BP Location: Left upper arm   Patient Position: Sitting   Pulse: 84   Resp: 18   Temp: 37 °C (98.6 °F)   TempSrc: Temporal   SpO2: 97%   Weight: 118 kg (260 lb)   Height: 1.829 m (6')     Body mass index is 35.26 kg/m².    Physical Exam  Constitutional:       General: He is not in acute distress.     Appearance: He is well-developed.   HENT:      Head: Normocephalic and atraumatic.      Ears:      Comments: 100% occlusive bilateral cerumen impactions were present, successfully irrigated clear by the nurse, revealing normal tympanic membranes.     Nose: Nose normal.      Mouth/Throat:      Pharynx: Oropharynx is clear.   Eyes:      General: No scleral icterus.     Conjunctiva/sclera: Conjunctivae normal.      Pupils: Pupils are equal, round, and reactive to light.      Comments: No ptosis, no conjunctival or scleral injection   Neck:      Thyroid: No thyromegaly.   Cardiovascular:      Rate and Rhythm: Normal rate and regular rhythm.      Heart sounds: Normal heart sounds. No murmur heard.   No friction rub. No gallop.       Comments: Pedal pulses: DP and PT pulses palpable and within normal limits  Pulmonary:      Effort: Pulmonary effort is normal. No respiratory distress.      Breath sounds: Normal breath sounds. No wheezing or rales.   Abdominal:      General: Bowel sounds are normal. There is no distension.      Palpations: Abdomen is soft. There is no mass.      Tenderness: There is no abdominal tenderness. There is no guarding or rebound.      Hernia: No hernia is present.      Comments: No Hepatomegaly or splenomegaly   Genitourinary:     Comments: AMADOU was not carried out.  No testicular mass, no inguinal hernia  Musculoskeletal:         General: No tenderness.       Cervical back: Normal range of motion and neck supple.      Comments: Intact ROM all tested joints (neck, back, shoulders, elbows, wrists, hands, hips, knees, ankles.    No leg swelling/ discoloration or other evidence of venous insufficiency   Lymphadenopathy:      Cervical: No cervical adenopathy.   Skin:     General: Skin is warm and dry.      Findings: No rash.      Comments: No rash, no suspicious lesions    The lesion of his concern overlying the right fifth finger MCP knuckle is a protuberant erythematous firm lesion consistent with either keloid scar or verruca though the surface is more smooth than typical verruca would be.  It is nontender there is no ulceration, no pigment.   Neurological:      Mental Status: He is alert and oriented to person, place, and time.      Cranial Nerves: No cranial nerve deficit.      Sensory: No sensory deficit.      Motor: No abnormal muscle tone.      Deep Tendon Reflexes: Reflexes normal.   Psychiatric:         Mood and Affect: Mood normal.         Assessment/Plan   Diagnoses and all orders for this visit:    Routine medical exam (Primary)    Essential hypertension    Impaired glucose tolerance    Hypertriglyceridemia    Metabolic syndrome  Assessment & Plan:  We had a very detailed discussion regarding metabolic syndrome and I am concerned about the possibility of premature cardiac disease.  Proceeding with calcium score test per below, repeat labs, aggressive lipid management as indicated.  Add [place] all of oil supplement as previously recommended but not carried out by patient.  Increase exercise.  Cut the carbs until it hurts!Work on meaningful weight loss.    Orders:  -     CT HEART CORONARY ARTERY CALCIUM SCORE WITHOUT IV CONTRAST; Future    Mild persistent asthmatic bronchitis without complication  Assessment & Plan:  Well-controlled on current inhalation therapy: May call for refills as needed.      Abnormal liver enzymes  -     ULTRASOUND ABDOMEN COMPLETE;  Future    Family history of early CAD  Assessment & Plan:  Get cardiac CT for Ca score as ordered:  this will help us not only to assess overall long term cardiac risk in concrete terms, but also help to establish a firm LDL target for this patient, and discern the potential benefit of aspirin prophylaxis.  We discussed it in detail, and the patient understands the rationale for testing, benefits of testing, and agrees to proceed.    Orders:  -     CT HEART CORONARY ARTERY CALCIUM SCORE WITHOUT IV CONTRAST; Future    Verruca    Hypertrophic scar of skin  Assessment & Plan:  After discussion and obtaining informed consent, elective excision/shave biopsy was carried out today see procedure note.    Orders:  -     Pathology Tissue Exam    Bilateral impacted cerumen  Assessment & Plan:  Ears were irrigated clear by nurse revealing normal underlying TMs.    Orders:  -     Ear cerumen removal    Obesity (BMI 35.0-39.9 without comorbidity)  Assessment & Plan:  Obesity with BMI of 35.26: Strongly encouraged weight loss.He has a normal hemoglobin A1c at 5.2 but long history of impaired glucose tolerance, recent fasting glucose of 106, and most certainly has significant insulin resistance as an underlying metabolic defect which is promoting his obesity.  He would be a very good candidate for GLP-1 agonist therapy in future.(If affordable)

## 2021-09-15 NOTE — PROCEDURES
Procedures    After discussion and obtaining informed consent, the described 6 mm flesh colored hypertrophic scar vs verucca of rt 5th finger MCP knuckle area   was thoroughly prepped with Betadine and alcohol, topical anesthetic Ethyl chloride applied following which the base of (each) lesion was infiltrated with 1.5 cc 1% Xylocaine with epinephrine, buffered and incorporated 0.2 cc  Methylprednisolone acetate ( to discourage recurrent hypertrophic scar formation) ( Lot # [id number], exp 3/2022) .      Shave biopsy was carried out to include the entire mole and 2-3 mm circumferential margin of surrounding tissue.  Base of lesion was radiocauterized using Mingle360 radiofrequency unit.   Estimated blood loss under 1 cc.   Neosporin and waterproof dressing (Tegaderm) were applied.  Wound care instructed in detail  (keep wound dressed, completely dry, with topical neosporin as tolerated for up to 7 days)    Patient tolerated the procedure(s) well.    Notify pt of pathology results.  Call if any problems with would healing.

## 2021-09-15 NOTE — PROGRESS NOTES
Ear cerumen removal    Date/Time: 9/15/2021 6:26 PM  Performed by: Deedee Walton MA  Authorized by: Benja mSith MD     Consent:     Consent obtained:  Verbal    Consent given by:  Patient    Risks discussed:  Bleeding, dizziness, infection, incomplete removal, pain and TM perforation    Alternatives discussed:  No treatment  Procedure details:     Location:  R ear and L ear    Procedure type: irrigation    Post-procedure details:     Inspection:  Bleeding    Hearing quality:  Normal    Patient tolerance of procedure:  Tolerated well, no immediate complications

## 2021-09-15 NOTE — PROGRESS NOTES
{(01769 = 3-10 minutes; 54385 = >10 minutes)  No modifier required):17398}      Subjective      Patient comes in for comprehensive physical exam today.        He completed comprehensive labs last week which show ongoing hypertriglyceridemia to 322, low HDL at 36 (5.6 ratio) LDL of 121, glucose 106, normal renal function, persistent elevation of ALT at 66 (was 56 on 2 occasions in 2020) normal CBC, normal hemoglobin A1c of 5.2, urinalysis all clear    He has not had prior calcium score testing.Family history is significant for CAD in mother so he is a candidate for early CAD screening at this time.     Patient ID: Andrew Thrasher is a 41 y.o. male.  1980      HPI    The following have been reviewed and updated as appropriate in this visit:       Review of Systems    Objective     Vitals:    09/15/21 1636   BP: 126/78   BP Location: Left upper arm   Patient Position: Sitting   Pulse: 84   Resp: 18   Temp: 37 °C (98.6 °F)   TempSrc: Temporal   SpO2: 97%   Weight: 118 kg (260 lb)   Height: 1.829 m (6')     Body mass index is 35.26 kg/m².    Physical Exam    Assessment/Plan   Diagnoses and all orders for this visit:    Routine medical exam (Primary)    Essential hypertension    Impaired glucose tolerance    Hypertriglyceridemia    Mild persistent asthmatic bronchitis without complication

## 2021-09-16 NOTE — ASSESSMENT & PLAN NOTE
We had a very detailed discussion regarding metabolic syndrome and I am concerned about the possibility of premature cardiac disease.  Proceeding with calcium score test per below, repeat labs, aggressive lipid management as indicated.  Add Citizen of [place] all of oil supplement as previously recommended but not carried out by patient.  Increase exercise.  Cut the carbs until it hurts!Work on meaningful weight loss.

## 2021-09-16 NOTE — ASSESSMENT & PLAN NOTE
Obesity with BMI of 35.26: Strongly encouraged weight loss.He has a normal hemoglobin A1c at 5.2 but long history of impaired glucose tolerance, recent fasting glucose of 106, and most certainly has significant insulin resistance as an underlying metabolic defect which is promoting his obesity.  He would be a very good candidate for GLP-1 agonist therapy in future.(If affordable)

## 2021-09-16 NOTE — ASSESSMENT & PLAN NOTE
After discussion and obtaining informed consent, elective excision/shave biopsy was carried out today see procedure note.

## 2021-09-16 NOTE — PATIENT INSTRUCTIONS
Hypertrophic scar of skin  After discussion and obtaining informed consent, elective excision/shave biopsy was carried out today see procedure note.    Bilateral impacted cerumen  Ears were irrigated clear by nurse revealing normal underlying TMs.    Family history of early CAD  Get cardiac CT for Ca score as ordered:  this will help us not only to assess overall long term cardiac risk in concrete terms, but also help to establish a firm LDL target for this patient, and discern the potential benefit of aspirin prophylaxis.  We discussed it in detail, and the patient understands the rationale for testing, benefits of testing, and agrees to proceed.    Asthmatic bronchitis  Well-controlled on current inhalation therapy: May call for refills as needed.    Metabolic syndrome  We had a very detailed discussion regarding metabolic syndrome and I am concerned about the possibility of premature cardiac disease.  Proceeding with calcium score test per below, repeat labs, aggressive lipid management as indicated.  Add [place] all of oil supplement as previously recommended but not carried out by patient.  Increase exercise.  Cut the carbs until it hurts!Work on meaningful weight loss.    Obesity (BMI 35.0-39.9 without comorbidity)  Obesity with BMI of 35.26: Strongly encouraged weight loss.He has a normal hemoglobin A1c at 5.2 but long history of impaired glucose tolerance, recent fasting glucose of 106, and most certainly has significant insulin resistance as an underlying metabolic defect which is promoting his obesity.  He would be a very good candidate for GLP-1 agonist therapy in future.(If affordable)

## 2021-09-16 NOTE — ASSESSMENT & PLAN NOTE
Get cardiac CT for Ca score as ordered:  this will help us not only to assess overall long term cardiac risk in concrete terms, but also help to establish a firm LDL target for this patient, and discern the potential benefit of aspirin prophylaxis.  We discussed it in detail, and the patient understands the rationale for testing, benefits of testing, and agrees to proceed.

## 2021-09-22 LAB
CLINICAL INFO: NORMAL
PATH REPORT.FINAL DX SPEC: NORMAL
PATH REPORT.GROSS SPEC: NORMAL
PATH REPORT.MICROSCOPIC SPEC OTHER STN: NORMAL
PATHOLOGIST NAME: NORMAL
PROCEDURE TYPE: NORMAL
SPECIMEN SOURCE: NORMAL

## 2021-10-02 DIAGNOSIS — I10 ESSENTIAL HYPERTENSION: ICD-10-CM

## 2021-10-04 RX ORDER — AZILSARTAN KAMEDOXOMIL AND CHLORTHALIDONE 40; 12.5 MG/1; MG/1
TABLET ORAL
Qty: 30 TABLET | Refills: 5 | Status: SHIPPED | OUTPATIENT
Start: 2021-10-04 | End: 2022-01-06

## 2021-11-29 ENCOUNTER — TRANSCRIBE ORDERS (OUTPATIENT)
Dept: SCHEDULING | Age: 41
End: 2021-11-29
Payer: COMMERCIAL

## 2021-11-29 DIAGNOSIS — M54.17 RADICULOPATHY, LUMBOSACRAL REGION: ICD-10-CM

## 2021-11-29 DIAGNOSIS — M54.50 LOW BACK PAIN: Primary | ICD-10-CM

## 2021-11-29 DIAGNOSIS — Z98.1 ARTHRODESIS STATUS: ICD-10-CM

## 2021-12-04 ENCOUNTER — HOSPITAL ENCOUNTER (OUTPATIENT)
Dept: RADIOLOGY | Facility: HOSPITAL | Age: 41
Discharge: HOME | End: 2021-12-04
Attending: NURSE PRACTITIONER
Payer: COMMERCIAL

## 2021-12-04 DIAGNOSIS — Z98.1 ARTHRODESIS STATUS: ICD-10-CM

## 2021-12-04 DIAGNOSIS — M54.17 RADICULOPATHY, LUMBOSACRAL REGION: ICD-10-CM

## 2021-12-04 DIAGNOSIS — M54.50 LOW BACK PAIN: ICD-10-CM

## 2021-12-04 RX ORDER — GADOBUTROL 604.72 MG/ML
11.2 INJECTION INTRAVENOUS ONCE
Status: COMPLETED | OUTPATIENT
Start: 2021-12-04 | End: 2021-12-04

## 2021-12-04 RX ADMIN — GADOBUTROL 11.2 ML: 604.72 INJECTION INTRAVENOUS at 08:26

## 2021-12-17 ENCOUNTER — HOSPITAL ENCOUNTER (OUTPATIENT)
Facility: HOSPITAL | Age: 41
Setting detail: SURGERY ADMIT
End: 2021-12-17
Attending: ORTHOPAEDIC SURGERY | Admitting: ORTHOPAEDIC SURGERY
Payer: COMMERCIAL

## 2022-01-03 ENCOUNTER — APPOINTMENT (OUTPATIENT)
Dept: PREADMISSION TESTING | Facility: HOSPITAL | Age: 42
End: 2022-01-03
Payer: COMMERCIAL

## 2022-01-03 VITALS — HEIGHT: 72 IN | WEIGHT: 250 LBS | BODY MASS INDEX: 33.86 KG/M2

## 2022-01-03 RX ORDER — IBUPROFEN 200 MG
200 TABLET ORAL EVERY 6 HOURS PRN
COMMUNITY
End: 2024-05-20

## 2022-01-03 NOTE — PRE-PROCEDURE INSTRUCTIONS
1. We will call you between 3 pm and 7 pm on 1/18/2022 to inform you of arrival time for your procedure. If you do not hear by 6PM, please call [phone] for arrival time.     2. Please arrive through the Main Entrance of the [facility] (across from the parking garage) and report to the Surgery Registration Desk on the day of your procedure.    3. Please follow the following fasting guidelines:     No solid food EIGHT HOURS prior to surgery.  Unlimited clear liquids, meaning water or PLAIN black coffee WITHOUT any milk, cream, sugar, or sweetener are permitted up to TWO HOURS prior to arrival at the hospital.    4. Early on the morning of the procedure please take your usual dose of the listed medications with a sip of water:    Take Albuterol as needed and Wixela the morning of surgery.     5. Other Instructions: You may brush your teeth the morning of the procedure. Rinse and spit, do not swallow.  Bring a list of your medications with dosages.  Use surgical wash as directed.     Take evening medications as you normally do.  Do not take Edarbyclor or Viagara the day of surgery.   No vitamins, supplements, or NSAIDs one week prior to surgery.    6. If you develop a cold, cough, fever, rash, or other symptom prior to the day of the procedure, please report it to your physician immediately.    7. If you need to cancel the procedure for any reason, please contact your physician.    8. Make arrangements to have someone drive you home from the procedure. If you have not arranged for transportation home, your surgery may be cancelled.     9. You may not take public transportation unless accompanied by a responsible person.    10. You may not drive a car or operate complex or potentially dangerous machinery for 24 hours following anesthesia and/or sedation.    11.  If it is medically necessary for you to have a longer stay, you will be informed as soon as the decision is made.    12. Only bring essential items to the  hospital.  Do not wear or bring anything of value to the hospital including jewelry of any kind, money, or wallet. Do not wear make-up or contact lenses.  DO NOT BRING MEDICATIONS FROM HOME unless instructed to do so. DO bring your hearing aids, glasses, and a case    13. No lotion, creams, powders, or oils on skin the morning of procedure     14. Dress in comfortable clothes.    15.  If instructed, please bring a copy of your Advanced Directive (Living Will/Durable Power of ) on the day of your procedure.     16. Patients need to quarantine from the time of PAT COVID test to day of surgery, regardless of COVID vaccine status.      17. Ensuring your safety at all times is a very important part of our [facility] Culture of Safety. After having surgery and sedation, you are at risk for falling and balance issues. Although you may feel awake, the effects of the medication can last up to 24 hours after anesthesia. If you need to use the bathroom during your recovery period, nursing staff will escort you there and stay with you to ensure your safety.    18. Refrain from drinking alcohol and smoking cigarettes for 24 hours prior to surgery.    19. Shower with antibacterial soap (Dial) the night before and morning of your procedure.  If your procedure indicates the need for CHG antiseptic wash (Bactoshield or Hibiclens), please use this instead and follow instructions as discussed at the time of your Pre-Admission Testing phone interview or visit.    Above instructions reviewed with patient and patient acknowledges understanding.    Form explained by: Marleen Murdock RN

## 2022-01-06 ENCOUNTER — TELEPHONE (OUTPATIENT)
Dept: FAMILY MEDICINE | Facility: CLINIC | Age: 42
End: 2022-01-06
Payer: COMMERCIAL

## 2022-01-06 RX ORDER — OLMESARTAN MEDOXOMIL AND HYDROCHLOROTHIAZIDE 40/12.5 40; 12.5 MG/1; MG/1
1 TABLET ORAL DAILY
Qty: 30 TABLET | Refills: 1 | Status: SHIPPED | OUTPATIENT
Start: 2022-01-06 | End: 2022-02-24 | Stop reason: SDUPTHER

## 2022-01-06 NOTE — TELEPHONE ENCOUNTER
Pt aware of information and will moniter readings. Please get pt schedule for appt in about over a month time frame.

## 2022-01-06 NOTE — TELEPHONE ENCOUNTER
Call patient: I will prescribe a new med that closely matches  the one no longer covered byt want to see him in 4 weeks to be sure that it is eccective: also bring in home BP cuff and write down daily or at least every other day blood pressures from home.     Rx is for Olmesartan HCT 40/12.5.    Schedule the visit  While you have him on the phone if possible.

## 2022-01-06 NOTE — TELEPHONE ENCOUNTER
Patient called stating that his insurance won't cover the EDARBYCLOR 40-12.5 mg tablet anymore. Patient had to switch insurances. Patient states that he only has week left of medication. Patient wants to know what can be done about his medication. Please advise

## 2022-01-10 DIAGNOSIS — J45.30 MILD PERSISTENT ASTHMATIC BRONCHITIS WITHOUT COMPLICATION: ICD-10-CM

## 2022-01-10 RX ORDER — ALBUTEROL SULFATE 90 UG/1
2 INHALANT RESPIRATORY (INHALATION) EVERY 6 HOURS PRN
Qty: 1 EACH | Refills: 0 | Status: SHIPPED | OUTPATIENT
Start: 2022-01-10 | End: 2022-02-07

## 2022-02-05 DIAGNOSIS — J45.30 MILD PERSISTENT ASTHMATIC BRONCHITIS WITHOUT COMPLICATION: ICD-10-CM

## 2022-02-07 RX ORDER — ALBUTEROL SULFATE 90 UG/1
INHALANT RESPIRATORY (INHALATION)
Qty: 6.7 EACH | Refills: 3 | Status: SHIPPED | OUTPATIENT
Start: 2022-02-07 | End: 2024-06-05 | Stop reason: SDUPTHER

## 2022-02-10 ENCOUNTER — OFFICE VISIT (OUTPATIENT)
Dept: FAMILY MEDICINE | Facility: CLINIC | Age: 42
End: 2022-02-10
Payer: COMMERCIAL

## 2022-02-10 VITALS
TEMPERATURE: 97.2 F | WEIGHT: 263.8 LBS | OXYGEN SATURATION: 97 % | RESPIRATION RATE: 18 BRPM | HEART RATE: 96 BPM | SYSTOLIC BLOOD PRESSURE: 110 MMHG | DIASTOLIC BLOOD PRESSURE: 66 MMHG | HEIGHT: 72 IN | BODY MASS INDEX: 35.73 KG/M2

## 2022-02-10 DIAGNOSIS — E88.810 METABOLIC SYNDROME: ICD-10-CM

## 2022-02-10 DIAGNOSIS — E78.1 HYPERTRIGLYCERIDEMIA: ICD-10-CM

## 2022-02-10 DIAGNOSIS — J45.30 MILD PERSISTENT ASTHMATIC BRONCHITIS WITHOUT COMPLICATION: ICD-10-CM

## 2022-02-10 DIAGNOSIS — I10 ESSENTIAL HYPERTENSION: Primary | ICD-10-CM

## 2022-02-10 DIAGNOSIS — R74.8 ABNORMAL LIVER ENZYMES: ICD-10-CM

## 2022-02-10 PROCEDURE — 3078F DIAST BP <80 MM HG: CPT | Performed by: FAMILY MEDICINE

## 2022-02-10 PROCEDURE — 99213 OFFICE O/P EST LOW 20 MIN: CPT | Performed by: FAMILY MEDICINE

## 2022-02-10 PROCEDURE — 3008F BODY MASS INDEX DOCD: CPT | Performed by: FAMILY MEDICINE

## 2022-02-10 PROCEDURE — 3074F SYST BP LT 130 MM HG: CPT | Performed by: FAMILY MEDICINE

## 2022-02-10 RX ORDER — MONTELUKAST SODIUM 10 MG/1
10 TABLET ORAL NIGHTLY
Qty: 90 TABLET | Refills: 1
Start: 2022-02-10 | End: 2022-03-04

## 2022-02-10 ASSESSMENT — ENCOUNTER SYMPTOMS
SORE THROAT: 0
HEADACHES: 0
VOMITING: 0
FATIGUE: 0
COUGH: 0
CHEST TIGHTNESS: 0
ACTIVITY CHANGE: 0
FEVER: 0
BLOOD IN STOOL: 0
DIARRHEA: 0
EYE REDNESS: 0
DIAPHORESIS: 0
FLANK PAIN: 0
EYE PAIN: 0
SHORTNESS OF BREATH: 0
DYSURIA: 0
DIZZINESS: 0
ADENOPATHY: 0
WEAKNESS: 0
ARTHRALGIAS: 0
UNEXPECTED WEIGHT CHANGE: 0
APPETITE CHANGE: 0
MYALGIAS: 0
PALPITATIONS: 0
NAUSEA: 0
ABDOMINAL PAIN: 0
CONSTIPATION: 0

## 2022-02-10 ASSESSMENT — PAIN SCALES - GENERAL: PAINLEVEL: 0-NO PAIN

## 2022-02-10 NOTE — ASSESSMENT & PLAN NOTE
We reviewed his labs.LFTs have improved but ALT remains high at 66.  Consider ultrasound to assess for fatty liver.  Low-fat weight loss diet, increase exercise,

## 2022-02-10 NOTE — ASSESSMENT & PLAN NOTE
We had a detailed discussion on his labs, metabolic syndrome hypertriglyceridemia, insulin resistance, beta cell attrition, long-term diabetes risk, obesity with BMI of 35.78, and ongoing LFT abnormalities (consider fatty liver infiltration).  Low-carb weight loss diet recommended and consideration for GLP-1 receptor agonist therapy with Ozempic were discussed in detail: He declines for now and will work on the diet..

## 2022-02-10 NOTE — PROGRESS NOTES
Subjective    41-year-old patient returns for follow-up of hypertension: Last treated at his September ( we titrated med in prep for L4-5 laminectomy which was done 1/19 ( 3 weeks ago) with good relief  Of bilateral leg  Sx.  ( and had prior  L5-S1 surgery 3 yrs ago)       He has not had  Ca score yet,    Plans to get  Back to gym within 3 weeks or so.  ( he tolerates walking OK).,      His  annual physical exam comprehensive labs and calcium score test were ordered at that time, (He has not scheduled the calcium score test yet but had the labs:) he had severe mixed hyperlipidemia with triglycerides 322 HDL low at 36, LDL high at 121.  Fasting glucose remains high at 106, kidney function normal LFTs still elevated with ALT of 66.  Hemoglobin A1c was at nondiabetic level at 5.2.  T lesion proved to be a verruca as suspected.    He remains on olmesartan HCT with good blood pressure control he excised  Patient ID: Andrew Thrasher is a 41 y.o. male.  1980      HPI    The following have been reviewed and updated as appropriate in this visit:   Allergies  Meds  Problems       Review of Systems   Constitutional: Negative for activity change, appetite change, diaphoresis, fatigue, fever and unexpected weight change.   HENT: Negative for congestion and sore throat.    Eyes: Negative for pain, redness and visual disturbance.   Respiratory: Negative for cough, chest tightness and shortness of breath.    Cardiovascular: Negative for chest pain and palpitations.   Gastrointestinal: Negative for abdominal pain, blood in stool, constipation, diarrhea, nausea and vomiting.   Genitourinary: Negative for dysuria and flank pain.   Musculoskeletal: Negative for arthralgias and myalgias.   Skin: Negative for rash.   Neurological: Negative for dizziness, weakness and headaches.   Hematological: Negative for adenopathy.   Psychiatric/Behavioral: Negative for behavioral problems.       Objective     Vitals:    02/10/22 1433    BP: 110/66   BP Location: Left upper arm   Patient Position: Sitting   Pulse: 96   Resp: 18   Temp: 36.2 °C (97.2 °F)   TempSrc: Temporal   SpO2: 97%   Weight: 120 kg (263 lb 12.8 oz)   Height: 1.829 m (6')     Body mass index is 35.78 kg/m².    Physical Exam  Constitutional:       General: He is not in acute distress.     Appearance: He is well-developed. He is obese.   HENT:      Right Ear: Tympanic membrane normal.      Left Ear: Tympanic membrane normal.      Mouth/Throat:      Pharynx: Oropharynx is clear.   Eyes:      Conjunctiva/sclera: Conjunctivae normal.   Neck:      Vascular: No carotid bruit.   Cardiovascular:      Rate and Rhythm: Normal rate and regular rhythm.      Heart sounds: Normal heart sounds. No murmur heard.    No friction rub. No gallop.   Pulmonary:      Effort: Pulmonary effort is normal.      Breath sounds: Normal breath sounds. No wheezing or rales.   Musculoskeletal:      Cervical back: Normal range of motion. No tenderness.      Right lower leg: No edema.      Left lower leg: No edema.      Comments: Back no CVA tenderness    Legs: no edema, no lacy venous insufficiency changes   Skin:     Findings: No rash.   Neurological:      Mental Status: He is oriented to person, place, and time.   Psychiatric:         Mood and Affect: Mood normal.         Assessment/Plan   Diagnoses and all orders for this visit:    Essential hypertension (Primary)  Assessment & Plan:  Well-controlled on olmesartan HCT: Continue same, follow-up in 6 months peer    Orders:  -     Comprehensive metabolic panel; Future    Mild persistent asthmatic bronchitis without complication  Assessment & Plan:  Stable: Continue current RxMay call for refills.    Orders:  -     montelukast (SINGULAIR) 10 mg tablet; Take 1 tablet (10 mg total) by mouth nightly.  -     Comprehensive metabolic panel; Future    Hypertriglyceridemia  -     Lipid panel; Future  -     Hemoglobin A1c; Future  -     Urinalysis with Reflex Culture (ED  and Outpatient only); Future    Metabolic syndrome  Assessment & Plan:  We had a detailed discussion on his labs, metabolic syndrome hypertriglyceridemia, insulin resistance, beta cell attrition, long-term diabetes risk, obesity with BMI of 35.78, and ongoing LFT abnormalities (consider fatty liver infiltration).  Low-carb weight loss diet recommended and consideration for GLP-1 receptor agonist therapy with Ozempic were discussed in detail: He declines for now and will work on the diet..    Orders:  -     Comprehensive metabolic panel; Future  -     Lipid panel; Future  -     CBC and Differential; Future    Abnormal liver enzymes  Assessment & Plan:  We reviewed his labs.LFTs have improved but ALT remains high at 66.  Consider ultrasound to assess for fatty liver.  Low-fat weight loss diet, increase exercise,

## 2022-02-24 ENCOUNTER — TELEPHONE (OUTPATIENT)
Dept: FAMILY MEDICINE | Facility: CLINIC | Age: 42
End: 2022-02-24
Payer: COMMERCIAL

## 2022-02-24 RX ORDER — OLMESARTAN MEDOXOMIL AND HYDROCHLOROTHIAZIDE 40/12.5 40; 12.5 MG/1; MG/1
1 TABLET ORAL DAILY
Qty: 90 TABLET | Refills: 1 | Status: SHIPPED | OUTPATIENT
Start: 2022-02-24 | End: 2022-09-20 | Stop reason: SDUPTHER

## 2022-02-24 NOTE — TELEPHONE ENCOUNTER
Pt is requesting new script for:    olmesartan-hydrochlorothiazide (BENICAR HCT) 40-12.5 mg per tablet    ** NEW PHARMACY: needs to go [facility] in [place] on [address] ([address]) - and now needs 90 day supply per insurance company **

## 2022-03-04 DIAGNOSIS — J45.30 MILD PERSISTENT ASTHMATIC BRONCHITIS WITHOUT COMPLICATION: ICD-10-CM

## 2022-03-04 RX ORDER — MONTELUKAST SODIUM 10 MG/1
TABLET ORAL
Qty: 90 TABLET | Refills: 1 | Status: SHIPPED | OUTPATIENT
Start: 2022-03-04 | End: 2022-09-10 | Stop reason: SDUPTHER

## 2022-03-13 DIAGNOSIS — J45.30 MILD PERSISTENT ASTHMATIC BRONCHITIS WITHOUT COMPLICATION: ICD-10-CM

## 2022-03-14 RX ORDER — MONTELUKAST SODIUM 10 MG/1
10 TABLET ORAL SEE ADMIN INSTRUCTIONS
Qty: 90 TABLET | Refills: 1 | OUTPATIENT
Start: 2022-03-14

## 2022-06-14 ENCOUNTER — HOSPITAL ENCOUNTER (OUTPATIENT)
Dept: RADIOLOGY | Facility: HOSPITAL | Age: 42
Discharge: HOME | End: 2022-06-14
Attending: FAMILY MEDICINE
Payer: COMMERCIAL

## 2022-06-14 DIAGNOSIS — R74.8 ABNORMAL LIVER ENZYMES: ICD-10-CM

## 2022-06-14 PROCEDURE — 76700 US EXAM ABDOM COMPLETE: CPT

## 2022-06-15 RX ORDER — SILDENAFIL 100 MG/1
TABLET, FILM COATED ORAL
Qty: 10 TABLET | Refills: 6 | Status: SHIPPED | OUTPATIENT
Start: 2022-06-15 | End: 2022-09-20 | Stop reason: SDUPTHER

## 2022-07-30 LAB
ALBUMIN SERPL-MCNC: 4.7 G/DL (ref 3.6–5.1)
ALBUMIN/GLOB SERPL: 2.2 (CALC) (ref 1–2.5)
ALP SERPL-CCNC: 83 U/L (ref 36–130)
ALT SERPL-CCNC: 77 U/L (ref 9–46)
APPEARANCE UR: CLEAR
AST SERPL-CCNC: 33 U/L (ref 10–40)
BACTERIA #/AREA URNS HPF: NORMAL /HPF
BACTERIA UR CULT: NORMAL
BASOPHILS # BLD AUTO: 53 CELLS/UL (ref 0–200)
BASOPHILS NFR BLD AUTO: 0.7 %
BILIRUB SERPL-MCNC: 0.5 MG/DL (ref 0.2–1.2)
BILIRUB UR QL STRIP: NEGATIVE
BUN SERPL-MCNC: 22 MG/DL (ref 7–25)
BUN/CREAT SERPL: ABNORMAL (CALC) (ref 6–22)
CALCIUM SERPL-MCNC: 9.6 MG/DL (ref 8.6–10.3)
CHLORIDE SERPL-SCNC: 102 MMOL/L (ref 98–110)
CHOLEST SERPL-MCNC: 198 MG/DL
CHOLEST/HDLC SERPL: 5.8 (CALC)
CO2 SERPL-SCNC: 29 MMOL/L (ref 20–32)
COLOR UR: YELLOW
CREAT SERPL-MCNC: 1.2 MG/DL (ref 0.6–1.29)
EGFRCR SERPLBLD CKD-EPI 2021: 77 ML/MIN/1.73M2
EOSINOPHIL # BLD AUTO: 129 CELLS/UL (ref 15–500)
EOSINOPHIL NFR BLD AUTO: 1.7 %
ERYTHROCYTE [DISTWIDTH] IN BLOOD BY AUTOMATED COUNT: 13.2 % (ref 11–15)
GLOBULIN SER CALC-MCNC: 2.1 G/DL (CALC) (ref 1.9–3.7)
GLUCOSE SERPL-MCNC: 103 MG/DL (ref 65–99)
GLUCOSE UR QL STRIP: NEGATIVE
HBA1C MFR BLD: 5.2 % OF TOTAL HGB
HCT VFR BLD AUTO: 43.5 % (ref 38.5–50)
HDLC SERPL-MCNC: 34 MG/DL
HGB BLD-MCNC: 14.6 G/DL (ref 13.2–17.1)
HGB UR QL STRIP: NEGATIVE
HYALINE CASTS #/AREA URNS LPF: NORMAL /LPF
KETONES UR QL STRIP: NEGATIVE
LDLC SERPL CALC-MCNC: 114 MG/DL (CALC)
LEUKOCYTE ESTERASE UR QL STRIP: NEGATIVE
LYMPHOCYTES # BLD AUTO: 2044 CELLS/UL (ref 850–3900)
LYMPHOCYTES NFR BLD AUTO: 26.9 %
MCH RBC QN AUTO: 27.8 PG (ref 27–33)
MCHC RBC AUTO-ENTMCNC: 33.6 G/DL (ref 32–36)
MCV RBC AUTO: 82.9 FL (ref 80–100)
MONOCYTES # BLD AUTO: 684 CELLS/UL (ref 200–950)
MONOCYTES NFR BLD AUTO: 9 %
NEUTROPHILS # BLD AUTO: 4689 CELLS/UL (ref 1500–7800)
NEUTROPHILS NFR BLD AUTO: 61.7 %
NITRITE UR QL STRIP: NEGATIVE
NONHDLC SERPL-MCNC: 164 MG/DL (CALC)
PH UR STRIP: 5.5 [PH] (ref 5–8)
PLATELET # BLD AUTO: 186 THOUSAND/UL (ref 140–400)
PMV BLD REES-ECKER: 9.8 FL (ref 7.5–12.5)
POTASSIUM SERPL-SCNC: 4.7 MMOL/L (ref 3.5–5.3)
PROT SERPL-MCNC: 6.8 G/DL (ref 6.1–8.1)
PROT UR QL STRIP: NEGATIVE
RBC # BLD AUTO: 5.25 MILLION/UL (ref 4.2–5.8)
RBC #/AREA URNS HPF: NORMAL /HPF
SODIUM SERPL-SCNC: 140 MMOL/L (ref 135–146)
SP GR UR STRIP: 1.02 (ref 1–1.03)
SQUAMOUS #/AREA URNS HPF: NORMAL /HPF
TRIGL SERPL-MCNC: 349 MG/DL
WBC # BLD AUTO: 7.6 THOUSAND/UL (ref 3.8–10.8)
WBC #/AREA URNS HPF: NORMAL /HPF

## 2022-09-10 ENCOUNTER — NURSE TRIAGE (OUTPATIENT)
Dept: FAMILY MEDICINE | Facility: CLINIC | Age: 42
End: 2022-09-10
Payer: COMMERCIAL

## 2022-09-10 DIAGNOSIS — J45.30 MILD PERSISTENT ASTHMATIC BRONCHITIS WITHOUT COMPLICATION: ICD-10-CM

## 2022-09-10 RX ORDER — MONTELUKAST SODIUM 10 MG/1
10 TABLET ORAL SEE ADMIN INSTRUCTIONS
Qty: 30 TABLET | Refills: 0 | Status: SHIPPED | OUTPATIENT
Start: 2022-09-10 | End: 2022-09-20 | Stop reason: SDUPTHER

## 2022-09-10 RX ORDER — FLUTICASONE PROPIONATE AND SALMETEROL 250; 50 UG/1; UG/1
1 POWDER RESPIRATORY (INHALATION) 2 TIMES DAILY
Qty: 60 EACH | Refills: 0 | Status: SHIPPED | OUTPATIENT
Start: 2022-09-10 | End: 2022-09-20 | Stop reason: SDUPTHER

## 2022-09-10 NOTE — TELEPHONE ENCOUNTER
"Synopsis:  Refill for Singulair and Advair. States that he has been trying to get a refill for 2 weeks now. Pharmacy has been sending refill requests and he is out of medication. Refilled for 30 days, he has upcoming appointment this month,  Disposition:  Home Care  Care Advice:  Care Advice Given     Given By Given At Rahel Figueredo CRNP 9/10/2022 10:09 AM No    HOME CARE - INFORMATION OR ADVICE ONLY CALL.       Patient/Caregiver understands and will follow care advice?  Yes, plans to follow advice        Orders Placed This Encounter:  Orders Placed This Encounter    montelukast (SINGULAIR) 10 mg tablet     Sig: Take 1 tablet (10 mg total) by mouth See admin instr. At Night     Dispense:  30 tablet     Refill:  0    fluticasone propion-salmeteroL (ADVAIR DISKUS) 250-50 mcg/dose diskus inhaler     Sig: Inhale 1 puff 2 (two) times a day.     Dispense:  60 each     Refill:  0             Reason for Disposition   Caller has medication question only, adult not sick, and triager answers question    Answer Assessment - Initial Assessment Questions  1. SYMPTOMS: \"Do you have any symptoms?\"      na  2. SEVERITY: If symptoms are present, ask \"Are they mild, moderate or severe?\"      na    Protocols used: MEDICATION QUESTION CALL-ADULT-      "

## 2022-09-20 ENCOUNTER — OFFICE VISIT (OUTPATIENT)
Dept: FAMILY MEDICINE | Facility: CLINIC | Age: 42
End: 2022-09-20
Payer: COMMERCIAL

## 2022-09-20 VITALS
SYSTOLIC BLOOD PRESSURE: 120 MMHG | TEMPERATURE: 98 F | HEIGHT: 72 IN | WEIGHT: 275 LBS | HEART RATE: 75 BPM | BODY MASS INDEX: 37.25 KG/M2 | DIASTOLIC BLOOD PRESSURE: 80 MMHG | OXYGEN SATURATION: 98 %

## 2022-09-20 DIAGNOSIS — Z82.49 FAMILY HISTORY OF EARLY CAD: ICD-10-CM

## 2022-09-20 DIAGNOSIS — J45.30 MILD PERSISTENT ASTHMATIC BRONCHITIS WITHOUT COMPLICATION: ICD-10-CM

## 2022-09-20 DIAGNOSIS — R74.8 ABNORMAL LIVER ENZYMES: Primary | ICD-10-CM

## 2022-09-20 DIAGNOSIS — J45.20 MILD INTERMITTENT ASTHMATIC BRONCHITIS WITHOUT COMPLICATION: ICD-10-CM

## 2022-09-20 DIAGNOSIS — I10 ESSENTIAL HYPERTENSION: ICD-10-CM

## 2022-09-20 DIAGNOSIS — K76.0 FATTY LIVER: ICD-10-CM

## 2022-09-20 DIAGNOSIS — Z23 NEED FOR VACCINATION: ICD-10-CM

## 2022-09-20 DIAGNOSIS — N52.8 OTHER MALE ERECTILE DYSFUNCTION: ICD-10-CM

## 2022-09-20 DIAGNOSIS — E04.1 THYROID NODULE: ICD-10-CM

## 2022-09-20 DIAGNOSIS — E78.1 HYPERTRIGLYCERIDEMIA: ICD-10-CM

## 2022-09-20 PROBLEM — E66.9 OBESITY (BMI 35.0-39.9 WITHOUT COMORBIDITY): Status: RESOLVED | Noted: 2021-09-15 | Resolved: 2022-09-20

## 2022-09-20 PROBLEM — H61.23 BILATERAL IMPACTED CERUMEN: Status: RESOLVED | Noted: 2021-09-15 | Resolved: 2022-09-20

## 2022-09-20 PROBLEM — E88.810 METABOLIC SYNDROME: Status: RESOLVED | Noted: 2017-04-20 | Resolved: 2022-09-20

## 2022-09-20 PROBLEM — Z01.818 PRE-OP EXAM: Status: RESOLVED | Noted: 2020-11-19 | Resolved: 2022-09-20

## 2022-09-20 PROBLEM — R73.02 IMPAIRED GLUCOSE TOLERANCE: Status: RESOLVED | Noted: 2020-11-19 | Resolved: 2022-09-20

## 2022-09-20 PROCEDURE — 90677 PCV20 VACCINE IM: CPT | Performed by: NURSE PRACTITIONER

## 2022-09-20 PROCEDURE — 3008F BODY MASS INDEX DOCD: CPT | Performed by: NURSE PRACTITIONER

## 2022-09-20 PROCEDURE — 90715 TDAP VACCINE 7 YRS/> IM: CPT | Performed by: NURSE PRACTITIONER

## 2022-09-20 PROCEDURE — 3079F DIAST BP 80-89 MM HG: CPT | Performed by: NURSE PRACTITIONER

## 2022-09-20 PROCEDURE — 90471 IMMUNIZATION ADMIN: CPT | Performed by: NURSE PRACTITIONER

## 2022-09-20 PROCEDURE — 99215 OFFICE O/P EST HI 40 MIN: CPT | Mod: 25 | Performed by: NURSE PRACTITIONER

## 2022-09-20 PROCEDURE — 3074F SYST BP LT 130 MM HG: CPT | Performed by: NURSE PRACTITIONER

## 2022-09-20 PROCEDURE — 90472 IMMUNIZATION ADMIN EACH ADD: CPT | Performed by: NURSE PRACTITIONER

## 2022-09-20 RX ORDER — SILDENAFIL 100 MG/1
TABLET, FILM COATED ORAL
Qty: 10 TABLET | Refills: 6 | Status: SHIPPED | OUTPATIENT
Start: 2022-09-20 | End: 2023-10-03

## 2022-09-20 RX ORDER — MONTELUKAST SODIUM 10 MG/1
10 TABLET ORAL SEE ADMIN INSTRUCTIONS
Qty: 90 TABLET | Refills: 1 | Status: SHIPPED | OUTPATIENT
Start: 2022-09-20 | End: 2022-09-26 | Stop reason: SDUPTHER

## 2022-09-20 RX ORDER — OLMESARTAN MEDOXOMIL AND HYDROCHLOROTHIAZIDE 40/12.5 40; 12.5 MG/1; MG/1
1 TABLET ORAL DAILY
Qty: 90 TABLET | Refills: 1 | Status: SHIPPED | OUTPATIENT
Start: 2022-09-20 | End: 2023-01-05 | Stop reason: SDUPTHER

## 2022-09-20 RX ORDER — FLUTICASONE PROPIONATE AND SALMETEROL 250; 50 UG/1; UG/1
1 POWDER RESPIRATORY (INHALATION) 2 TIMES DAILY
Qty: 180 EACH | Refills: 6 | Status: SHIPPED | OUTPATIENT
Start: 2022-09-20 | End: 2022-09-26 | Stop reason: SDUPTHER

## 2022-09-20 RX ORDER — SILDENAFIL 100 MG/1
TABLET, FILM COATED ORAL
Qty: 10 TABLET | Refills: 6 | Status: SHIPPED | OUTPATIENT
Start: 2022-09-20 | End: 2022-09-20 | Stop reason: SDUPTHER

## 2022-09-20 ASSESSMENT — ENCOUNTER SYMPTOMS
FEVER: 0
WOUND: 0
DIFFICULTY URINATING: 0
DIZZINESS: 0
CHILLS: 0
NERVOUS/ANXIOUS: 0
FREQUENCY: 0
DYSURIA: 0
CHEST TIGHTNESS: 0
VOMITING: 0
UNEXPECTED WEIGHT CHANGE: 0
HEADACHES: 0
CONSTIPATION: 0
ABDOMINAL DISTENTION: 0
DYSPHORIC MOOD: 0
ABDOMINAL PAIN: 0
DECREASED CONCENTRATION: 0
SLEEP DISTURBANCE: 0
FATIGUE: 0
MYALGIAS: 0
DIARRHEA: 0
SHORTNESS OF BREATH: 0
PHOTOPHOBIA: 0
HEMATURIA: 0
SINUS PRESSURE: 0
WEAKNESS: 0
SEIZURES: 0
APPETITE CHANGE: 0
TROUBLE SWALLOWING: 0
SINUS PAIN: 0
COUGH: 0
BLOOD IN STOOL: 0
FLANK PAIN: 0
PALPITATIONS: 0
NAUSEA: 0
SORE THROAT: 0
ACTIVITY CHANGE: 0
DIAPHORESIS: 0
ARTHRALGIAS: 0

## 2022-09-20 ASSESSMENT — PATIENT HEALTH QUESTIONNAIRE - PHQ9: SUM OF ALL RESPONSES TO PHQ9 QUESTIONS 1 & 2: 0

## 2022-09-20 NOTE — ASSESSMENT & PLAN NOTE
9/20/2022 started semaglutide BMI 37, Weight 275 lbs   Goals improve knee pain, reduce fatty liver, clothes fit better and improve back pain

## 2022-09-20 NOTE — PATIENT INSTRUCTIONS
If you have severe nausea, vomitting, or abdominal pain stop your medication and call me.  Remember you can have pancreatitis or a gallbladder attack on this medication- it is rare but it can occur.  It is the main reason we dose escalate.      If you miss more than 2 doses ie 2 weeks of medication or more, especially if you are on more than 0.5 mg weekly please call me.  We may need to reduce your dose to avoid complications.        Monitor for constipation, goal to have a bowel movement daily or at least every other day.  If an issue with constipation please ensure you are drinking enough water and veggies- if still issues then add in anywhere from 1/2 scoop miralax to 2 scoops miralax.  You may need to play around with this and even sometimes every other day dosing is ok.      Inject weekly- if significant side effects we can move to twice weekly dosing by splitting the dose. You will need extra needles if we opt for this route.      Side effects are usually dose related meaning the higher the dosing the more side effects.  You can reduce side effects by following a healthy diet as you will note with some foods you will have more nausea.  Sometimes also playing around with timing of your injection can help as well.      Make sure you rotate your sites you may inject in your leg on the side or your abdomen.  Look at your abdomen as a clock.  If you inject at the same site your skin can thicken and you will not absorb the medication.      Ensure water intake 1-2 liters daily, veggies at least 2 meals per day.        Avoid sugared as well as diet beverages. Do not drink your calories.    Practice mindfulness not only in eating but also in your day to day life.        Goal exercise 30-40 min most days of the week.  If you cannot achieve this start with 10 minutes per day.  Studies show that 10 min every day is likely to build a lifestyle.  Opt for more weight lifting than cardio.  As you have afterburn all day.  Your  body learns to conserve calories when you only perform cardio so with more workouts you burn less calories.  If you do intervals ie go as fast as you can then recover you can combat this.  You can use a [employer] ady.      Reduce stress, ensure enough and good sleep.  If you think you may have sleep apnea we should obtain a sleep study as this make it more difficult to lose weight.      You do not need to refrigerate your pen if you are going to use within one month.      If you are traveling you can adjust your dose a bit to accommodate that.  Just reach out.        Discussed follow up of one month for the next 3 months then may move to every two months as long as email sent with update on weight, symptoms and tracking.    Inject weekly- if significant side effects we can move to twice weekly dosing by splitting the dose. You will need extra needles if we opt for this route.      Ensure water intake 1-2 liters daily, veggies at least 2 meals per day.      If drinking, please reduce alcohol to 2-4 drink per week or less.  Alcohol slows how your liver digests and will lead to your food being converted to sugars as your liver is breaking down the alcohol.  It is also empty calories, raises blood pressure and blood sugar.  As well as a depressant.    Avoid sugared as well as diet beverages. Do not drink your calories.    Read labels    Do not drop below 1200 calories- your body will go into starvation mode    Ideally use a tracker such as [employer], Lose it, [employer]ometer or [employer] ady.      Goal exercise 30-40 min most days of the week.  If you cannot achieve this start with 10 minutes per day.  Studies show that 10 min every day is likely to build a lifestyle.  Opt for more weight lifting than cardio.  As you have afterburn all day.  Your body learns to conserve calories when you only perform cardio so with more workouts you burn less calories.  If you do intervals ie go as fast as you can then recover you can combat  this.  You can use a [employer] ady.      Reduce stress, ensure enough and good sleep.  If you think you may have sleep apnea we should obtain a sleep study as this make it more difficult to lose weight.      Monitor for constipation, goal to have a bowel movement daily or at least every other day.  If an issue with constipation please ensure you are drinking enough water and veggies- if still issues then add in anywhere from 1/2 scoop miralax to 2 scoops miralax.  You may need to play around with this and even sometimes every other day dosing is ok.      Monitor for site pain, skin thickening or redness.      If you have severe nausea, vomitting, or any other concerning symptom let me know right away.  You can hold semaglutide while waiting to discuss with me.      The more you put into this the more you will get out of it.  We are trying to establish an new lifestyle so hopefully we can taper off semaglutide.      My goal is to obtain your goal weight, maintain for 6 months then taper off.  If you consistently gain 8 pounds let me know and we will resume until back at goal weight for 6 months.        Schedule CT calcium     Nonalcholic fatty liver is often a result of weight gain over the years.  It is very common in fact 1 in 4 adults have this condition but most are not aware.  It has been associated with excess body weight, increased abdominal fat, hyperlipidemia, diabetes, insulin resistance, and hypertension.  The majority of people with this condition will  not have any long term effects from this condition.  However 1 in 10 people with develop an inflammatory response in the liver which is the body's attempt to get rid of the fat.  This response leads to a scarring which can make the liver stiff called fibrosis.  1 in 20 people who develop fibrosis usually develop cirrhosis which increases their risk for liver failure and liver cancer. This is a small subset of people with fatty liver but we still like to be  aggressive in evaluation with a special type of ultrasound to check the liver for stiffness-called an elastogram.  We also check thyroid levels if not done already.      This condition in early stages and without fibrosis is reversible.      We recommend avoiding alcohol as it can cause liver inflammation as well as increase fatty liver.  It is miracle grow for your liver.        Liver related mortality from NAFLD is approximately 10% and most patients have increased risk of mortality due to cardiovascular disease. Therefore, managing CV disease risk is a central part of managing NAFLD. We discussed that there are no currently approved drug treatments specifically for fatty liver disease, however there are certain lifestyle modifications that can be made at this time to reduce the primary complications of NAFLD. These complications include cardiovascular events, malignancy, and progressive liver disease.      1. We discussed the importance of weight loss. A 5-10% weight loss would greatly decrease the risks of complications from fatty liver disease.      2. We discussed the importance of diet modification.      3. Moderate exercise 3-4 times a week for at least 30 minutes at a time.     4. I recommended drinking caffeinated black coffee as there is a potential benefit for patients with ARROYO     5. Other metabolic co morbidities should be closely monitored and controlled, this includes HTN, DM, and HLD. Studies have shown benefits of Victoza in NAFLD.

## 2022-09-20 NOTE — ASSESSMENT & PLAN NOTE
Confirmed on liver US 4/2022 repeat next year with elastogram  Started semaglutide and stressed weight loss

## 2022-09-20 NOTE — PROGRESS NOTES
Reason for visit:   Chief Complaint   Patient presents with    Follow-up     Fmr Sarah pt.       HPI  is a 42 y.o. 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  Teaching on rotation of sites, side effects and monitoring, injection techniques.  Recommended hands on instruction with pharmacist or schedule diagnostic teaching with my nurse.      Recommended daily exercise goal, partner with nutrition Elena is our preferred provider and therapist to address stress management and coping.      After review of options for weight management and shared decision making we opted for semaglutide.  As well as discussion regarding option of Comprehensive [facility] Management program at [facility].      See problem list for following:     Personal goals     Method of tracking will obtain     Nutritionist agrees     Exercise goals          HTN: compliant with medications   BP Readings from Last 3 Encounters:   09/20/22 120/80   02/10/22 110/66   09/15/21 126/78       ED viagra works well     Lab Results   Component Value Date    LDLCALC 114 (H) 07/29/2022    LDLCALC 121 (H) 09/09/2021    LDLCALC 129 (H) 08/21/2020    LDLCALC 132 (H) 11/19/2018    HDL 34 (L) 07/29/2022    HDL 36 (L) 09/09/2021    HDL 35 (L) 08/21/2020    HDL 39 (L) 11/19/2018    TRIG 349 (H) 07/29/2022    TRIG 322 (H) 09/09/2021    TRIG 325 (H) 08/21/2020    TRIG 227 (H) 11/19/2018    GLUCOSE 103 (H) 07/29/2022    GLUCOSE NEGATIVE 07/29/2022    HGBA1C 5.2 07/29/2022    HGBA1C 5.2  09/09/2021     Lab Results   Component Value Date    ALT 77 (H) 07/29/2022    ALT 66 (H) 09/09/2021    ALT 56 (H) 11/04/2020    ALT 56 (H) 08/21/2020    AST 33 07/29/2022    AST 34 09/09/2021    AST 32 11/04/2020    AST 28 08/21/2020    ALKPHOS 83 07/29/2022    ALKPHOS 66 09/09/2021    ALKPHOS 61 11/04/2020    ALKPHOS 61 08/21/2020    BILITOT 0.5 07/29/2022    BILITOT 0.6 09/09/2021    BILITOT 0.6 11/04/2020    BILITOT 0.7 08/21/2020       Problem List:  Patient Active Problem List    Diagnosis Date Noted    Abnormal liver enzymes 09/15/2021    Family history of early CAD 09/15/2021    Hypertrophic scar of skin 09/15/2021    Bilateral impacted cerumen 09/15/2021    Obesity (BMI 35.0-39.9 without comorbidity) 09/15/2021    Pre-op exam 11/19/2020    Hypertriglyceridemia 11/19/2020    Impaired glucose tolerance 11/19/2020    Thyroid nodule 09/10/2020    Erectile dysfunction 01/29/2020    Skin tag 05/28/2019    S/P lumbar fusion 11/21/2018    Preop cardiovascular exam 11/19/2018    Osteoarthritis of spine with radiculopathy, lumbar region 11/19/2018    Asthmatic bronchitis 11/14/2018    Metabolic syndrome 04/20/2017    Mixed hyperlipidemia 04/20/2017    Arthritis 03/23/2017    Essential hypertension 03/23/2017    Risk factors for obstructive sleep apnea 03/23/2017       Surgical History:  Past Surgical History:   Procedure Laterality Date    BACK SURGERY  2018    BACK SURGERY  01/19/2022    EYE SURGERY Right     cataract    KNEE ARTHROSCOPY Right     SHOULDER ARTHROSCOPY Bilateral     WISDOM TOOTH EXTRACTION         Social History:  Social History     Social History Narrative    Not on file       Family History:  Family History   Problem Relation Age of Onset    Hyperlipidemia Biological Mother     Hypertension Biological Mother     Stroke Biological Mother     Heart attack Biological Mother     Hypertension Biological Father     Arthritis Biological Father     Asthma Biological Brother      Hypertension Paternal Grandmother     Other Paternal Grandmother         complications from hip fracture    Throat cancer Maternal Grandmother     Parkinsonism Maternal Grandfather        Allergies:  No known allergies    Current Medications:  Current Outpatient Medications   Medication Sig Dispense Refill    albuterol HFA (VENTOLIN HFA) 90 mcg/actuation inhaler INHALE 2 PUFFS EVERY 6 HOURS AS NEEDED FOR WHEEZING 6.7 each 3    fluticasone propion-salmeteroL (ADVAIR DISKUS) 250-50 mcg/dose diskus inhaler Inhale 1 puff 2 (two) times a day. 180 each 6    ibuprofen 200 mg tablet Take 200 mg by mouth every 6 (six) hours as needed for mild pain.      montelukast (SINGULAIR) 10 mg tablet Take 1 tablet (10 mg total) by mouth See admin instr. At Night 90 tablet 1    olmesartan-hydrochlorothiazide (BENICAR HCT) 40-12.5 mg per tablet Take 1 tablet by mouth daily. 90 tablet 1    semaglutide 0.25 mg or 0.5 mg(2 mg/1.5 mL) pen injector Inject 0.25 mg under the skin every (seven) 7 days. 1.5 mL 0    sildenafiL (VIAGRA) 100 mg tablet TAKE 1 TABLET BY MOUTH EVERY DAY AS NEEDED FOR ERECTILE DYSFUNCTION 10 tablet 6     No current facility-administered medications for this visit.         Review of Systems:  Review of Systems   Constitutional: Negative for activity change, appetite change, chills, diaphoresis, fatigue, fever and unexpected weight change.   HENT: Negative for congestion, hearing loss, sinus pressure, sinus pain, sore throat and trouble swallowing.    Eyes: Negative for photophobia and visual disturbance.   Respiratory: Negative for cough, chest tightness and shortness of breath.         Asthma controlled now    Cardiovascular: Negative for chest pain, palpitations and leg swelling.   Gastrointestinal: Negative for abdominal distention, abdominal pain, blood in stool, constipation, diarrhea, nausea and vomiting.   Genitourinary: Negative for difficulty urinating, dysuria, flank pain, frequency, genital  sores, hematuria and testicular pain.   Musculoskeletal: Negative for arthralgias, gait problem and myalgias.   Skin: Negative for rash and wound.   Neurological: Negative for dizziness, seizures, syncope, weakness and headaches.   Psychiatric/Behavioral: Negative for decreased concentration, dysphoric mood, self-injury, sleep disturbance and suicidal ideas. The patient is not nervous/anxious.        Objective     Vital Signs:  Vitals:    09/20/22 1335   BP: 120/80   Pulse: 75   Temp: 36.7 °C (98 °F)   SpO2: 98%     Wt Readings from Last 3 Encounters:   09/20/22 125 kg (275 lb)   02/10/22 120 kg (263 lb 12.8 oz)   01/03/22 113 kg (250 lb)       BMI:  Body mass index is 37.3 kg/m².     Physical Exam  Constitutional:       Appearance: He is not ill-appearing or diaphoretic.   Eyes:      Extraocular Movements: Extraocular movements intact.      Pupils: Pupils are equal, round, and reactive to light.   Neck:      Comments: Thyroid is not enlarged.  No nodules palpable.  Normal swallow     Cardiovascular:      Rate and Rhythm: Normal rate and regular rhythm.      Pulses: Normal pulses.      Heart sounds: No murmur heard.  Pulmonary:      Effort: Pulmonary effort is normal.      Breath sounds: Normal breath sounds.   Abdominal:      General: Abdomen is flat. Bowel sounds are normal. There is no distension.      Palpations: Abdomen is soft. There is no mass.      Tenderness: There is no abdominal tenderness.   Skin:     Capillary Refill: Capillary refill takes less than 2 seconds.   Neurological:      General: No focal deficit present.      Mental Status: He is alert.   Psychiatric:         Mood and Affect: Mood normal.         Behavior: Behavior normal.         Thought Content: Thought content normal.         Judgment: Judgment normal.         Recent labs before today:     Lab Results   Component Value Date    WBC 7.6 07/29/2022    HGB 14.6 07/29/2022    HCT 43.5 07/29/2022     07/29/2022    CHOL 198 07/29/2022     TRIG 349 (H) 07/29/2022    HDL 34 (L) 07/29/2022    ALT 77 (H) 07/29/2022    AST 33 07/29/2022     07/29/2022    K 4.7 07/29/2022     07/29/2022    CREATININE 1.20 07/29/2022    BUN 22 07/29/2022    CO2 29 07/29/2022    TSH 1.76 11/04/2020    INR 1.0 11/19/2018    GLUC Negative 03/27/2017    HGBA1C 5.2 07/29/2022       Patient Instructions   If you have severe nausea, vomitting, or abdominal pain stop your medication and call me.  Remember you can have pancreatitis or a gallbladder attack on this medication- it is rare but it can occur.  It is the main reason we dose escalate.      If you miss more than 2 doses ie 2 weeks of medication or more, especially if you are on more than 0.5 mg weekly please call me.  We may need to reduce your dose to avoid complications.        Monitor for constipation, goal to have a bowel movement daily or at least every other day.  If an issue with constipation please ensure you are drinking enough water and veggies- if still issues then add in anywhere from 1/2 scoop miralax to 2 scoops miralax.  You may need to play around with this and even sometimes every other day dosing is ok.      Inject weekly- if significant side effects we can move to twice weekly dosing by splitting the dose. You will need extra needles if we opt for this route.      Side effects are usually dose related meaning the higher the dosing the more side effects.  You can reduce side effects by following a healthy diet as you will note with some foods you will have more nausea.  Sometimes also playing around with timing of your injection can help as well.      Make sure you rotate your sites you may inject in your leg on the side or your abdomen.  Look at your abdomen as a clock.  If you inject at the same site your skin can thicken and you will not absorb the medication.      Ensure water intake 1-2 liters daily, veggies at least 2 meals per day.        Avoid sugared as well as diet beverages. Do not  drink your calories.    Practice mindfulness not only in eating but also in your day to day life.        Goal exercise 30-40 min most days of the week.  If you cannot achieve this start with 10 minutes per day.  Studies show that 10 min every day is likely to build a lifestyle.  Opt for more weight lifting than cardio.  As you have afterburn all day.  Your body learns to conserve calories when you only perform cardio so with more workouts you burn less calories.  If you do intervals ie go as fast as you can then recover you can combat this.  You can use a [place] ady.      Reduce stress, ensure enough and good sleep.  If you think you may have sleep apnea we should obtain a sleep study as this make it more difficult to lose weight.      You do not need to refrigerate your pen if you are going to use within one month.      If you are traveling you can adjust your dose a bit to accommodate that.  Just reach out.        Discussed follow up of one month for the next 3 months then may move to every two months as long as email sent with update on weight, symptoms and tracking.    Inject weekly- if significant side effects we can move to twice weekly dosing by splitting the dose. You will need extra needles if we opt for this route.      Ensure water intake 1-2 liters daily, veggies at least 2 meals per day.      If drinking, please reduce alcohol to 2-4 drink per week or less.  Alcohol slows how your liver digests and will lead to your food being converted to sugars as your liver is breaking down the alcohol.  It is also empty calories, raises blood pressure and blood sugar.  As well as a depressant.    Avoid sugared as well as diet beverages. Do not drink your calories.    Read labels    Do not drop below 1200 calories- your body will go into starvation mode    Ideally use a tracker such as Medikidz Pal, Lose it, Chronometer or WW ady.      Goal exercise 30-40 min most days of the week.  If you cannot achieve this start  with 10 minutes per day.  Studies show that 10 min every day is likely to build a lifestyle.  Opt for more weight lifting than cardio.  As you have afterburn all day.  Your body learns to conserve calories when you only perform cardio so with more workouts you burn less calories.  If you do intervals ie go as fast as you can then recover you can combat this.  You can use a Fang[employer] ady.      Reduce stress, ensure enough and good sleep.  If you think you may have sleep apnea we should obtain a sleep study as this make it more difficult to lose weight.      Monitor for constipation, goal to have a bowel movement daily or at least every other day.  If an issue with constipation please ensure you are drinking enough water and veggies- if still issues then add in anywhere from 1/2 scoop miralax to 2 scoops miralax.  You may need to play around with this and even sometimes every other day dosing is ok.      Monitor for site pain, skin thickening or redness.      If you have severe nausea, vomitting, or any other concerning symptom let me know right away.  You can hold semaglutide while waiting to discuss with me.      The more you put into this the more you will get out of it.  We are trying to establish an new lifestyle so hopefully we can taper off semaglutide.      My goal is to obtain your goal weight, maintain for 6 months then taper off.  If you consistently gain 8 pounds let me know and we will resume until back at goal weight for 6 months.        Schedule CT calcium     Nonalcholic fatty liver is often a result of weight gain over the years.  It is very common in fact 1 in 4 adults have this condition but most are not aware.  It has been associated with excess body weight, increased abdominal fat, hyperlipidemia, diabetes, insulin resistance, and hypertension.  The majority of people with this condition will  not have any long term effects from this condition.  However 1 in 10 people with develop an inflammatory  response in the liver which is the body's attempt to get rid of the fat.  This response leads to a scarring which can make the liver stiff called fibrosis.  1 in 20 people who develop fibrosis usually develop cirrhosis which increases their risk for liver failure and liver cancer. This is a small subset of people with fatty liver but we still like to be aggressive in evaluation with a special type of ultrasound to check the liver for stiffness-called an elastogram.  We also check thyroid levels if not done already.      This condition in early stages and without fibrosis is reversible.      We recommend avoiding alcohol as it can cause liver inflammation as well as increase fatty liver.  It is miracle grow for your liver.        Liver related mortality from NAFLD is approximately 10% and most patients have increased risk of mortality due to cardiovascular disease. Therefore, managing CV disease risk is a central part of managing NAFLD. We discussed that there are no currently approved drug treatments specifically for fatty liver disease, however there are certain lifestyle modifications that can be made at this time to reduce the primary complications of NAFLD. These complications include cardiovascular events, malignancy, and progressive liver disease.      1. We discussed the importance of weight loss. A 5-10% weight loss would greatly decrease the risks of complications from fatty liver disease.      2. We discussed the importance of diet modification.      3. Moderate exercise 3-4 times a week for at least 30 minutes at a time.     4. I recommended drinking caffeinated black coffee as there is a potential benefit for patients with ARROYO     5. Other metabolic co morbidities should be closely monitored and controlled, this includes HTN, DM, and HLD. Studies have shown benefits of Victoza in NAFLD.        If you do not hear from me regarding results in 7-10 days either via a call or the portal please call.       Problem List Items Addressed This Visit        Respiratory    Asthmatic bronchitis     Stable on Advair and singulair            Relevant Medications    fluticasone propion-salmeteroL (ADVAIR DISKUS) 250-50 mcg/dose diskus inhaler    montelukast (SINGULAIR) 10 mg tablet       Circulatory    Essential hypertension    Relevant Medications    olmesartan-hydrochlorothiazide (BENICAR HCT) 40-12.5 mg per tablet    Other Relevant Orders    Ambulatory referral to [facility]       Digestive    Fatty liver     Confirmed on liver US 4/2022 repeat next year with elastogram  Started semaglutide and stressed weight loss              Genitourinary    Erectile dysfunction    Relevant Medications    sildenafiL (VIAGRA) 100 mg tablet       Endocrine/Metabolic    RESOLVED: Thyroid nodule    BMI 37.0-37.9, adult     9/20/2022 started semaglutide BMI 37, Weight 275 lbs   Goals improve knee pain, reduce fatty liver, clothes fit better and improve back pain            Relevant Medications    semaglutide 0.25 mg or 0.5 mg(2 mg/1.5 mL) pen injector    Other Relevant Orders    Ambulatory referral to [facility]       Other    Hypertriglyceridemia    Relevant Orders    Ambulatory referral to [facility]    RESOLVED: Abnormal liver enzymes - Primary    Relevant Medications    semaglutide 0.25 mg or 0.5 mg(2 mg/1.5 mL) pen injector    Family history of early CAD     CT calcium stressed            Relevant Orders    CT HEART CORONARY ARTERY CALCIUM SCORE WITHOUT IV CONTRAST      Other Visit Diagnoses     Need for vaccination        Relevant Orders    Pneumococcal conjugate vaccine 20-valent IM (Completed)    Tdap vaccine greater than or equal to 6yo IM (Completed)      I spent 50 minutes on this date of service performing the following activities: obtaining history, performing examination, entering orders, documenting, preparing for visit, obtaining / reviewing records, providing counseling and education and independently  reviewing study/studies.           SRUTHI Brewer  9/20/2022

## 2022-09-26 DIAGNOSIS — J45.30 MILD PERSISTENT ASTHMATIC BRONCHITIS WITHOUT COMPLICATION: ICD-10-CM

## 2022-09-26 RX ORDER — MONTELUKAST SODIUM 10 MG/1
10 TABLET ORAL SEE ADMIN INSTRUCTIONS
Qty: 90 TABLET | Refills: 1 | Status: SHIPPED | OUTPATIENT
Start: 2022-09-26 | End: 2023-01-05 | Stop reason: SDUPTHER

## 2022-09-26 RX ORDER — FLUTICASONE PROPIONATE AND SALMETEROL 250; 50 UG/1; UG/1
1 POWDER RESPIRATORY (INHALATION) 2 TIMES DAILY
Qty: 180 EACH | Refills: 1 | Status: SHIPPED | OUTPATIENT
Start: 2022-09-26 | End: 2023-12-12 | Stop reason: SDUPTHER

## 2022-09-26 NOTE — TELEPHONE ENCOUNTER
Medicine Refill Request    Last Office: 9/20/2022   Last Consult Visit: 11/19/2020  Last Telemedicine Visit: Visit date not found    Next Appointment: 10/18/2022      Current Outpatient Medications:     albuterol HFA (VENTOLIN HFA) 90 mcg/actuation inhaler, INHALE 2 PUFFS EVERY 6 HOURS AS NEEDED FOR WHEEZING, Disp: 6.7 each, Rfl: 3    fluticasone propion-salmeteroL (ADVAIR DISKUS) 250-50 mcg/dose diskus inhaler, Inhale 1 puff 2 (two) times a day., Disp: 180 each, Rfl: 6    ibuprofen 200 mg tablet, Take 200 mg by mouth every 6 (six) hours as needed for mild pain., Disp: , Rfl:     montelukast (SINGULAIR) 10 mg tablet, Take 1 tablet (10 mg total) by mouth See admin instr. At Night, Disp: 90 tablet, Rfl: 1    olmesartan-hydrochlorothiazide (BENICAR HCT) 40-12.5 mg per tablet, Take 1 tablet by mouth daily., Disp: 90 tablet, Rfl: 1    semaglutide 0.25 mg or 0.5 mg(2 mg/1.5 mL) pen injector, Inject 0.25 mg under the skin every (seven) 7 days., Disp: 1.5 mL, Rfl: 0    sildenafiL (VIAGRA) 100 mg tablet, TAKE 1 TABLET BY MOUTH EVERY DAY AS NEEDED FOR ERECTILE DYSFUNCTION- no nitroglycerin, Disp: 10 tablet, Rfl: 6      BP Readings from Last 3 Encounters:   09/20/22 120/80   02/10/22 110/66   09/15/21 126/78       Recent Lab results:  Lab Results   Component Value Date    CHOL 198 07/29/2022   ,   Lab Results   Component Value Date    HDL 34 (L) 07/29/2022   ,   Lab Results   Component Value Date    LDLCALC 114 (H) 07/29/2022   ,   Lab Results   Component Value Date    TRIG 349 (H) 07/29/2022        Lab Results   Component Value Date    GLUCOSE 103 (H) 07/29/2022    GLUCOSE NEGATIVE 07/29/2022   ,   Lab Results   Component Value Date    HGBA1C 5.2 07/29/2022         Lab Results   Component Value Date    CREATININE 1.20 07/29/2022       Lab Results   Component Value Date    TSH 1.76 11/04/2020

## 2022-10-18 ENCOUNTER — OFFICE VISIT (OUTPATIENT)
Dept: FAMILY MEDICINE | Facility: CLINIC | Age: 42
End: 2022-10-18
Payer: COMMERCIAL

## 2022-10-18 VITALS
WEIGHT: 260 LBS | HEIGHT: 72 IN | DIASTOLIC BLOOD PRESSURE: 80 MMHG | OXYGEN SATURATION: 98 % | HEART RATE: 75 BPM | SYSTOLIC BLOOD PRESSURE: 122 MMHG | BODY MASS INDEX: 35.21 KG/M2 | TEMPERATURE: 99.2 F

## 2022-10-18 DIAGNOSIS — Z23 NEED FOR IMMUNIZATION AGAINST INFLUENZA: ICD-10-CM

## 2022-10-18 DIAGNOSIS — R74.8 ABNORMAL LIVER ENZYMES: ICD-10-CM

## 2022-10-18 PROCEDURE — 90686 IIV4 VACC NO PRSV 0.5 ML IM: CPT | Performed by: NURSE PRACTITIONER

## 2022-10-18 PROCEDURE — 3074F SYST BP LT 130 MM HG: CPT | Performed by: NURSE PRACTITIONER

## 2022-10-18 PROCEDURE — 90471 IMMUNIZATION ADMIN: CPT | Performed by: NURSE PRACTITIONER

## 2022-10-18 PROCEDURE — 3008F BODY MASS INDEX DOCD: CPT | Performed by: NURSE PRACTITIONER

## 2022-10-18 PROCEDURE — 99213 OFFICE O/P EST LOW 20 MIN: CPT | Mod: 25 | Performed by: NURSE PRACTITIONER

## 2022-10-18 PROCEDURE — 3079F DIAST BP 80-89 MM HG: CPT | Performed by: NURSE PRACTITIONER

## 2022-10-18 ASSESSMENT — ENCOUNTER SYMPTOMS
ABDOMINAL PAIN: 0
NAUSEA: 0
VOMITING: 0
DIARRHEA: 0
CONSTIPATION: 0
ABDOMINAL DISTENTION: 0

## 2022-10-18 NOTE — PROGRESS NOTES
Reason for visit:   Chief Complaint   Patient presents with    Follow-up     Weight check       HPI  is a 42 y.o. male     HPI    Semaglutide fu       Side effects: denies severe nausea, vomitting, constipation, or abdominal pain.  Aware if these symptoms occur to stop the medication and call me.      Tracking intake:not yet     Exercise:  Gym 2 times per week     Sites are without issue and rotating regularly      Wt Readings from Last 3 Encounters:   10/18/22 118 kg (260 lb)   09/20/22 125 kg (275 lb)   02/10/22 120 kg (263 lb 12.8 oz)             Problem List:  Patient Active Problem List    Diagnosis Date Noted    BMI 37.0-37.9, adult 09/20/2022    Fatty liver 09/20/2022    Family history of early CAD 09/15/2021    Hypertrophic scar of skin 09/15/2021    Hypertriglyceridemia 11/19/2020    Erectile dysfunction 01/29/2020    Skin tag 05/28/2019    S/P lumbar fusion 11/21/2018    Preop cardiovascular exam 11/19/2018    Osteoarthritis of spine with radiculopathy, lumbar region 11/19/2018    Asthmatic bronchitis 11/14/2018    Mixed hyperlipidemia 04/20/2017    Arthritis 03/23/2017    Essential hypertension 03/23/2017    Risk factors for obstructive sleep apnea 03/23/2017       Surgical History:  Past Surgical History:   Procedure Laterality Date    BACK SURGERY  2018    BACK SURGERY  01/19/2022    EYE SURGERY Right     cataract    KNEE ARTHROSCOPY Right     SHOULDER ARTHROSCOPY Bilateral     WISDOM TOOTH EXTRACTION         Social History:  Social History     Social History Narrative    Not on file       Family History:  Family History   Problem Relation Age of Onset    Hyperlipidemia Biological Mother     Hypertension Biological Mother     Stroke Biological Mother     Heart attack Biological Mother     Hypertension Biological Father     Arthritis Biological Father     Asthma Biological Brother     Hypertension Paternal Grandmother     Other Paternal Grandmother         complications  from hip fracture    Throat cancer Maternal Grandmother     Parkinsonism Maternal Grandfather        Allergies:  No known allergies    Current Medications:  Current Outpatient Medications   Medication Sig Dispense Refill    albuterol HFA (VENTOLIN HFA) 90 mcg/actuation inhaler INHALE 2 PUFFS EVERY 6 HOURS AS NEEDED FOR WHEEZING 6.7 each 3    fluticasone propion-salmeteroL (ADVAIR DISKUS) 250-50 mcg/dose diskus inhaler Inhale 1 puff 2 (two) times a day. 180 each 1    ibuprofen 200 mg tablet Take 200 mg by mouth every 6 (six) hours as needed for mild pain.      montelukast (SINGULAIR) 10 mg tablet Take 1 tablet (10 mg total) by mouth See admin instr. At Night 90 tablet 1    olmesartan-hydrochlorothiazide (BENICAR HCT) 40-12.5 mg per tablet Take 1 tablet by mouth daily. 90 tablet 1    semaglutide 0.25 mg or 0.5 mg(2 mg/1.5 mL) pen injector Inject 0.25 mg under the skin every (seven) 7 days. 1.5 mL 0    sildenafiL (VIAGRA) 100 mg tablet TAKE 1 TABLET BY MOUTH EVERY DAY AS NEEDED FOR ERECTILE DYSFUNCTION- no nitroglycerin 10 tablet 6     No current facility-administered medications for this visit.         Review of Systems:  Review of Systems   Gastrointestinal: Negative for abdominal distention, abdominal pain, constipation, diarrhea, nausea and vomiting.   Skin: Negative.        Objective     Vital Signs:  Vitals:    10/18/22 1505   BP: 122/80   Pulse: 75   Temp: 37.3 °C (99.2 °F)   SpO2: 98%       BMI:  Body mass index is 35.26 kg/m².     Physical Exam  Neck:      Comments: Thyroid is not enlarged.  No nodules palpable.  Normal swallow     Cardiovascular:      Rate and Rhythm: Normal rate and regular rhythm.   Pulmonary:      Effort: Pulmonary effort is normal.   Abdominal:      General: Abdomen is flat. Bowel sounds are normal.      Palpations: There is no mass.      Tenderness: There is no abdominal tenderness. There is no guarding.   Skin:     Comments: Sites clear          Recent labs before today:      Lab Results   Component Value Date    WBC 7.6 07/29/2022    HGB 14.6 07/29/2022    HCT 43.5 07/29/2022     07/29/2022    CHOL 198 07/29/2022    TRIG 349 (H) 07/29/2022    HDL 34 (L) 07/29/2022    ALT 77 (H) 07/29/2022    AST 33 07/29/2022     07/29/2022    K 4.7 07/29/2022     07/29/2022    CREATININE 1.20 07/29/2022    BUN 22 07/29/2022    CO2 29 07/29/2022    TSH 1.76 11/04/2020    INR 1.0 11/19/2018    GLUC Negative 03/27/2017    HGBA1C 5.2 07/29/2022       Patient Instructions   Increase semaglutide to 0.5 mg weekly starting with next dose     Follow up one month       Discussed follow up of one month for the next 3 months then may move to every two months as long as email sent with update on weight, symptoms and tracking.    Inject weekly- if significant side effects we can move to twice weekly dosing by splitting the dose. You will need extra needles if we opt for this route.        If you have severe nausea, vomitting, or abdominal pain stop your medication and call me.  Remember you can have pancreatitis or a gallbladder attack on this medication- it is rare but it can occur.  It is the main reason we dose escalate.        Ensure water intake 1-2 liters daily, veggies at least 2 meals per day.      If drinking, please reduce alcohol to 2-4 drink per week or less.  Alcohol slows how your liver digests and will lead to your food being converted to sugars as your liver is breaking down the alcohol.  It is also empty calories, raises blood pressure and blood sugar.  As well as a depressant.    Avoid sugared as well as diet beverages. Do not drink your calories.    Read labels    Do not drop below 1200 calories- your body will go into starvation mode    Ideally use a tracker such as [employer] Pal, Lose it, Chronometer or WW ady.  Goal of macros 40% carbohydrates, 30% fat and protein with smart choices     Goal exercise 30-40 min most days of the week.  If you cannot achieve this start with  10 minutes per day.  Studies show that 10 min every day is likely to build a lifestyle.  Opt for more weight lifting than cardio.  As you have afterburn all day.  Your body learns to conserve calories when you only perform cardio so with more workouts you burn less calories.  If you do intervals ie go as fast as you can then recover you can combat this.  You can use a [employer] ady.      Reduce stress, ensure enough and good sleep.  If you think you may have sleep apnea we should obtain a sleep study as this make it more difficult to lose weight.      Monitor for constipation, goal to have a bowel movement daily or at least every other day.  If an issue with constipation please ensure you are drinking enough water and veggies- if still issues then add in anywhere from 1/2 scoop miralax to 2 scoops miralax.  You may need to play around with this and even sometimes every other day dosing is ok.      Monitor for site pain, skin thickening or redness.      If you have severe nausea, vomitting, or any other concerning symptom let me know right away.  You can hold semaglutide while waiting to discuss with me.      The more you put into this the more you will get out of it.  We are trying to establish an new lifestyle so hopefully we can taper off semaglutide.      My goal is to obtain your goal weight, maintain for 6 months then taper off.  If you consistently gain 8 pounds let me know and we will resume until back at goal weight for 6 months.      Please keep your scheduled appointments and ensure you following my recommendations.  Refills will not be given without following discharge instructions.              If you do not hear from me regarding results in 7-10 days either via a call or the portal please call.      Problem List Items Addressed This Visit        Endocrine/Metabolic    BMI 37.0-37.9, adult    Relevant Medications    semaglutide 0.25 mg or 0.5 mg(2 mg/1.5 mL) pen injector   Other Visit Diagnoses     Need for  immunization against influenza    -  Primary    Relevant Orders    Influenza vaccine quadrivalent preservative free 6 mon and older IM (FluLaval)    Abnormal liver enzymes        Relevant Medications    semaglutide 0.25 mg or 0.5 mg(2 mg/1.5 mL) pen injector               SRUTHI Brewer  10/18/2022

## 2022-10-18 NOTE — PATIENT INSTRUCTIONS
Increase semaglutide to 0.5 mg weekly starting with next dose     Follow up one month       Discussed follow up of one month for the next 3 months then may move to every two months as long as email sent with update on weight, symptoms and tracking.    Inject weekly- if significant side effects we can move to twice weekly dosing by splitting the dose. You will need extra needles if we opt for this route.        If you have severe nausea, vomitting, or abdominal pain stop your medication and call me.  Remember you can have pancreatitis or a gallbladder attack on this medication- it is rare but it can occur.  It is the main reason we dose escalate.        Ensure water intake 1-2 liters daily, veggies at least 2 meals per day.      If drinking, please reduce alcohol to 2-4 drink per week or less.  Alcohol slows how your liver digests and will lead to your food being converted to sugars as your liver is breaking down the alcohol.  It is also empty calories, raises blood pressure and blood sugar.  As well as a depressant.    Avoid sugared as well as diet beverages. Do not drink your calories.    Read labels    Do not drop below 1200 calories- your body will go into starvation mode    Ideally use a tracker such as [employer] Pal, Lose it, Chronometer or [employer] ady.  Goal of macros 40% carbohydrates, 30% fat and protein with smart stiQRd     Goal exercise 30-40 min most days of the week.  If you cannot achieve this start with 10 minutes per day.  Studies show that 10 min every day is likely to build a lifestyle.  Opt for more weight lifting than cardio.  As you have afterburn all day.  Your body learns to conserve calories when you only perform cardio so with more workouts you burn less calories.  If you do intervals ie go as fast as you can then recover you can combat this.  You can use a [employer] ady.      Reduce stress, ensure enough and good sleep.  If you think you may have sleep apnea we should obtain a sleep study as this  make it more difficult to lose weight.      Monitor for constipation, goal to have a bowel movement daily or at least every other day.  If an issue with constipation please ensure you are drinking enough water and veggies- if still issues then add in anywhere from 1/2 scoop miralax to 2 scoops miralax.  You may need to play around with this and even sometimes every other day dosing is ok.      Monitor for site pain, skin thickening or redness.      If you have severe nausea, vomitting, or any other concerning symptom let me know right away.  You can hold semaglutide while waiting to discuss with me.      The more you put into this the more you will get out of it.  We are trying to establish an new lifestyle so hopefully we can taper off semaglutide.      My goal is to obtain your goal weight, maintain for 6 months then taper off.  If you consistently gain 8 pounds let me know and we will resume until back at goal weight for 6 months.      Please keep your scheduled appointments and ensure you following my recommendations.  Refills will not be given without following discharge instructions.

## 2022-12-05 ENCOUNTER — OFFICE VISIT (OUTPATIENT)
Dept: FAMILY MEDICINE | Facility: CLINIC | Age: 42
End: 2022-12-05
Payer: COMMERCIAL

## 2022-12-05 VITALS
SYSTOLIC BLOOD PRESSURE: 114 MMHG | TEMPERATURE: 98 F | HEIGHT: 72 IN | HEART RATE: 73 BPM | DIASTOLIC BLOOD PRESSURE: 80 MMHG | BODY MASS INDEX: 35.08 KG/M2 | OXYGEN SATURATION: 98 % | WEIGHT: 259 LBS

## 2022-12-05 DIAGNOSIS — K76.0 FATTY LIVER: ICD-10-CM

## 2022-12-05 PROCEDURE — 3008F BODY MASS INDEX DOCD: CPT | Performed by: NURSE PRACTITIONER

## 2022-12-05 PROCEDURE — 3079F DIAST BP 80-89 MM HG: CPT | Performed by: NURSE PRACTITIONER

## 2022-12-05 PROCEDURE — 3074F SYST BP LT 130 MM HG: CPT | Performed by: NURSE PRACTITIONER

## 2022-12-05 PROCEDURE — 99213 OFFICE O/P EST LOW 20 MIN: CPT | Performed by: NURSE PRACTITIONER

## 2022-12-05 NOTE — PROGRESS NOTES
Reason for visit:   Chief Complaint   Patient presents with   • Follow-up     Weight check       HPI  is a 42 y.o. male     HPI    Semaglutide fu       Side effects: denies severe nausea, vomitting, constipation, or abdominal pain.  Aware if these symptoms occur to stop the medication and call me.      Tracking intake: Lose it ady     Exercise: gym 2 times per week and add in weekends now     Sites are without issue and rotating regularly    Overall feeling better       Wt Readings from Last 3 Encounters:   12/05/22 117 kg (259 lb)   10/18/22 118 kg (260 lb)   09/20/22 125 kg (275 lb)       Problem List:  Patient Active Problem List    Diagnosis Date Noted   • BMI 37.0-37.9, adult 09/20/2022   • Fatty liver 09/20/2022   • Family history of early CAD 09/15/2021   • Hypertrophic scar of skin 09/15/2021   • Hypertriglyceridemia 11/19/2020   • Erectile dysfunction 01/29/2020   • Skin tag 05/28/2019   • S/P lumbar fusion 11/21/2018   • Preop cardiovascular exam 11/19/2018   • Osteoarthritis of spine with radiculopathy, lumbar region 11/19/2018   • Asthmatic bronchitis 11/14/2018   • Mixed hyperlipidemia 04/20/2017   • Arthritis 03/23/2017   • Essential hypertension 03/23/2017   • Risk factors for obstructive sleep apnea 03/23/2017       Surgical History:  Past Surgical History:   Procedure Laterality Date   • BACK SURGERY  2018   • BACK SURGERY  01/19/2022   • EYE SURGERY Right     cataract   • KNEE ARTHROSCOPY Right    • SHOULDER ARTHROSCOPY Bilateral    • WISDOM TOOTH EXTRACTION         Social History:  Social History     Social History Narrative   • Not on file       Family History:  Family History   Problem Relation Age of Onset   • Hyperlipidemia Biological Mother    • Hypertension Biological Mother    • Stroke Biological Mother    • Heart attack Biological Mother    • Hypertension Biological Father    • Arthritis Biological Father    • Asthma Biological Brother    • Hypertension Paternal Grandmother    • Other  Paternal Grandmother         complications from hip fracture   • Throat cancer Maternal Grandmother    • Parkinsonism Maternal Grandfather        Allergies:  No known allergies    Current Medications:  Current Outpatient Medications   Medication Sig Dispense Refill   • albuterol HFA (VENTOLIN HFA) 90 mcg/actuation inhaler INHALE 2 PUFFS EVERY 6 HOURS AS NEEDED FOR WHEEZING 6.7 each 3   • fluticasone propion-salmeteroL (ADVAIR DISKUS) 250-50 mcg/dose diskus inhaler Inhale 1 puff 2 (two) times a day. 180 each 1   • ibuprofen 200 mg tablet Take 200 mg by mouth every 6 (six) hours as needed for mild pain.     • montelukast (SINGULAIR) 10 mg tablet Take 1 tablet (10 mg total) by mouth See admin instr. At Night 90 tablet 1   • olmesartan-hydrochlorothiazide (BENICAR HCT) 40-12.5 mg per tablet Take 1 tablet by mouth daily. 90 tablet 1   • semaglutide 0.25 mg or 0.5 mg(2 mg/1.5 mL) pen injector Inject 0.5 mg under the skin every (seven) 7 days. 3 mL 0   • sildenafiL (VIAGRA) 100 mg tablet TAKE 1 TABLET BY MOUTH EVERY DAY AS NEEDED FOR ERECTILE DYSFUNCTION- no nitroglycerin 10 tablet 6     No current facility-administered medications for this visit.         Review of Systems:  Review of Systems    Objective     Vital Signs:  Vitals:    12/05/22 1419   BP: 114/80   Pulse: 73   Temp: 36.7 °C (98 °F)   SpO2: 98%       BMI:  Body mass index is 35.13 kg/m².     Physical Exam  Constitutional:       Appearance: He is obese.   Neck:      Comments: Thyroid is not enlarged.  No nodules palpable.  Normal swallow     Cardiovascular:      Rate and Rhythm: Normal rate and regular rhythm.      Pulses: Normal pulses.   Pulmonary:      Effort: Pulmonary effort is normal.   Abdominal:      General: Abdomen is flat. Bowel sounds are normal. There is no distension.      Palpations: Abdomen is soft. There is no mass.      Tenderness: There is no abdominal tenderness. There is no guarding or rebound.   Neurological:      Mental Status: He is  alert.   Psychiatric:         Mood and Affect: Mood normal.         Thought Content: Thought content normal.         Judgment: Judgment normal.         Recent labs before today:     Lab Results   Component Value Date    WBC 7.6 07/29/2022    HGB 14.6 07/29/2022    HCT 43.5 07/29/2022     07/29/2022    CHOL 198 07/29/2022    TRIG 349 (H) 07/29/2022    HDL 34 (L) 07/29/2022    ALT 77 (H) 07/29/2022    AST 33 07/29/2022     07/29/2022    K 4.7 07/29/2022     07/29/2022    CREATININE 1.20 07/29/2022    BUN 22 07/29/2022    CO2 29 07/29/2022    TSH 1.76 11/04/2020    INR 1.0 11/19/2018    GLUC Negative 03/27/2017    HGBA1C 5.2 07/29/2022       Patient Instructions   Discussed follow up of one month for the next 3 months then may move to every two months as long as email sent with update on weight, symptoms and tracking.    Inject weekly- if significant side effects we can move to twice weekly dosing by splitting the dose. You will need extra needles if we opt for this route.        If you have severe nausea, vomitting, or abdominal pain stop your medication and call me.  Remember you can have pancreatitis or a gallbladder attack on this medication- it is rare but it can occur.  It is the main reason we dose escalate.        Ensure water intake 1-2 liters daily, veggies at least 2 meals per day.      If drinking, please reduce alcohol to 2-4 drink per week or less.  Alcohol slows how your liver digests and will lead to your food being converted to sugars as your liver is breaking down the alcohol.  It is also empty calories, raises blood pressure and blood sugar.  As well as a depressant.    Avoid sugared as well as diet beverages. Do not drink your calories.    Read labels    Do not drop below 1200 calories- your body will go into starvation mode    Ideally use a tracker such as [employer] Pal, Lose it, Chronometer or [employer] ady.  Goal of macros 40% carbohydrates, 30% fat and protein with smart choices      Goal exercise 30-40 min most days of the week.  If you cannot achieve this start with 10 minutes per day.  Studies show that 10 min every day is likely to build a lifestyle.  Opt for more weight lifting than cardio.  As you have afterburn all day.  Your body learns to conserve calories when you only perform cardio so with more workouts you burn less calories.  If you do intervals ie go as fast as you can then recover you can combat this.  You can use a [employer] ady.      Reduce stress, ensure enough and good sleep.  If you think you may have sleep apnea we should obtain a sleep study as this make it more difficult to lose weight.      Monitor for constipation, goal to have a bowel movement daily or at least every other day.  If an issue with constipation please ensure you are drinking enough water and veggies- if still issues then add in anywhere from 1/2 scoop miralax to 2 scoops miralax.  You may need to play around with this and even sometimes every other day dosing is ok.      Monitor for site pain, skin thickening or redness.      If you have severe nausea, vomitting, or any other concerning symptom let me know right away.  You can hold semaglutide while waiting to discuss with me.      The more you put into this the more you will get out of it.  We are trying to establish an new lifestyle so hopefully we can taper off semaglutide.      My goal is to obtain your goal weight, maintain for 6 months then taper off.  If you consistently gain 8 pounds let me know and we will resume until back at goal weight for 6 months.      Please keep your scheduled appointments and ensure you following my recommendations.  Refills will not be given without following discharge instructions.              Increase to 1 mg weekly     Track dosing     Aim for 45 grams of carbs per meal     Cut back on soda and drinks         If you do not hear from me regarding results in 7-10 days either via a call or the portal please call.       Problem List Items Addressed This Visit        Digestive    Fatty liver    Relevant Medications    semaglutide       Endocrine/Metabolic    BMI 35.0-35.9,adult - Primary     9/20/2022 started semaglutide BMI 37, Weight 275 lbs   Goals improve knee pain, reduce fatty liver, clothes fit better and improve back pain              Relevant Medications    semaglutide            SRUTHI Brewer  12/5/2022

## 2022-12-05 NOTE — PATIENT INSTRUCTIONS
Discussed follow up of one month for the next 3 months then may move to every two months as long as email sent with update on weight, symptoms and tracking.    Inject weekly- if significant side effects we can move to twice weekly dosing by splitting the dose. You will need extra needles if we opt for this route.        If you have severe nausea, vomitting, or abdominal pain stop your medication and call me.  Remember you can have pancreatitis or a gallbladder attack on this medication- it is rare but it can occur.  It is the main reason we dose escalate.        Ensure water intake 1-2 liters daily, veggies at least 2 meals per day.      If drinking, please reduce alcohol to 2-4 drink per week or less.  Alcohol slows how your liver digests and will lead to your food being converted to sugars as your liver is breaking down the alcohol.  It is also empty calories, raises blood pressure and blood sugar.  As well as a depressant.    Avoid sugared as well as diet beverages. Do not drink your calories.    Read labels    Do not drop below 1200 calories- your body will go into starvation mode    Ideally use a tracker such as MyFitness Pal, Lose it, Chronometer or [employer] ady.  Goal of macros 40% carbohydrates, 30% fat and protein with smart choices     Goal exercise 30-40 min most days of the week.  If you cannot achieve this start with 10 minutes per day.  Studies show that 10 min every day is likely to build a lifestyle.  Opt for more weight lifting than cardio.  As you have afterburn all day.  Your body learns to conserve calories when you only perform cardio so with more workouts you burn less calories.  If you do intervals ie go as fast as you can then recover you can combat this.  You can use a [employer] ady.      Reduce stress, ensure enough and good sleep.  If you think you may have sleep apnea we should obtain a sleep study as this make it more difficult to lose weight.      Monitor for constipation, goal to have a bowel  movement daily or at least every other day.  If an issue with constipation please ensure you are drinking enough water and veggies- if still issues then add in anywhere from 1/2 scoop miralax to 2 scoops miralax.  You may need to play around with this and even sometimes every other day dosing is ok.      Monitor for site pain, skin thickening or redness.      If you have severe nausea, vomitting, or any other concerning symptom let me know right away.  You can hold semaglutide while waiting to discuss with me.      The more you put into this the more you will get out of it.  We are trying to establish an new lifestyle so hopefully we can taper off semaglutide.      My goal is to obtain your goal weight, maintain for 6 months then taper off.  If you consistently gain 8 pounds let me know and we will resume until back at goal weight for 6 months.      Please keep your scheduled appointments and ensure you following my recommendations.  Refills will not be given without following discharge instructions.              Increase to 1 mg weekly     Track dosing     Aim for 45 grams of carbs per meal     Cut back on soda and drinks

## 2023-01-05 ENCOUNTER — OFFICE VISIT (OUTPATIENT)
Dept: FAMILY MEDICINE | Facility: CLINIC | Age: 43
End: 2023-01-05
Payer: COMMERCIAL

## 2023-01-05 VITALS
HEART RATE: 71 BPM | OXYGEN SATURATION: 97 % | SYSTOLIC BLOOD PRESSURE: 118 MMHG | RESPIRATION RATE: 16 BRPM | WEIGHT: 257.8 LBS | BODY MASS INDEX: 34.92 KG/M2 | HEIGHT: 72 IN | TEMPERATURE: 97.6 F | DIASTOLIC BLOOD PRESSURE: 78 MMHG

## 2023-01-05 DIAGNOSIS — J45.30 MILD PERSISTENT ASTHMATIC BRONCHITIS WITHOUT COMPLICATION: ICD-10-CM

## 2023-01-05 DIAGNOSIS — I10 ESSENTIAL HYPERTENSION: ICD-10-CM

## 2023-01-05 DIAGNOSIS — E78.2 MIXED HYPERLIPIDEMIA: ICD-10-CM

## 2023-01-05 DIAGNOSIS — K76.0 FATTY LIVER: Primary | ICD-10-CM

## 2023-01-05 PROCEDURE — 3008F BODY MASS INDEX DOCD: CPT | Performed by: NURSE PRACTITIONER

## 2023-01-05 PROCEDURE — 99213 OFFICE O/P EST LOW 20 MIN: CPT | Performed by: NURSE PRACTITIONER

## 2023-01-05 PROCEDURE — 3074F SYST BP LT 130 MM HG: CPT | Performed by: NURSE PRACTITIONER

## 2023-01-05 PROCEDURE — 3078F DIAST BP <80 MM HG: CPT | Performed by: NURSE PRACTITIONER

## 2023-01-05 RX ORDER — OLMESARTAN MEDOXOMIL AND HYDROCHLOROTHIAZIDE 40/12.5 40; 12.5 MG/1; MG/1
1 TABLET ORAL DAILY
Qty: 90 TABLET | Refills: 1 | Status: SHIPPED | OUTPATIENT
Start: 2023-01-05 | End: 2023-10-02

## 2023-01-05 RX ORDER — MONTELUKAST SODIUM 10 MG/1
10 TABLET ORAL SEE ADMIN INSTRUCTIONS
Qty: 90 TABLET | Refills: 1 | Status: SHIPPED | OUTPATIENT
Start: 2023-01-05 | End: 2023-03-17 | Stop reason: SDUPTHER

## 2023-01-05 ASSESSMENT — ENCOUNTER SYMPTOMS
GASTROINTESTINAL NEGATIVE: 1
ACTIVITY CHANGE: 0
APPETITE CHANGE: 0

## 2023-01-05 NOTE — TELEPHONE ENCOUNTER
Medicine Refill Request    Last Office: 12/5/2022   Last Consult Visit: 11/19/2020  Last Telemedicine Visit: Visit date not found    Next Appointment: 1/5/2023      Current Outpatient Medications:   •  albuterol HFA (VENTOLIN HFA) 90 mcg/actuation inhaler, INHALE 2 PUFFS EVERY 6 HOURS AS NEEDED FOR WHEEZING, Disp: 6.7 each, Rfl: 3  •  fluticasone propion-salmeteroL (ADVAIR DISKUS) 250-50 mcg/dose diskus inhaler, Inhale 1 puff 2 (two) times a day., Disp: 180 each, Rfl: 1  •  ibuprofen 200 mg tablet, Take 200 mg by mouth every 6 (six) hours as needed for mild pain., Disp: , Rfl:   •  montelukast (SINGULAIR) 10 mg tablet, Take 1 tablet (10 mg total) by mouth See admin instr. At Night, Disp: 90 tablet, Rfl: 1  •  olmesartan-hydrochlorothiazide (BENICAR HCT) 40-12.5 mg per tablet, Take 1 tablet by mouth daily., Disp: 90 tablet, Rfl: 1  •  semaglutide, Inject 1 mg under the skin every (seven) 7 days., Disp: 3 mL, Rfl: 0  •  sildenafiL (VIAGRA) 100 mg tablet, TAKE 1 TABLET BY MOUTH EVERY DAY AS NEEDED FOR ERECTILE DYSFUNCTION- no nitroglycerin, Disp: 10 tablet, Rfl: 6      BP Readings from Last 3 Encounters:   12/05/22 114/80   10/18/22 122/80   09/20/22 120/80       Recent Lab results:  Lab Results   Component Value Date    CHOL 198 07/29/2022   ,   Lab Results   Component Value Date    HDL 34 (L) 07/29/2022   ,   Lab Results   Component Value Date    LDLCALC 114 (H) 07/29/2022   ,   Lab Results   Component Value Date    TRIG 349 (H) 07/29/2022        Lab Results   Component Value Date    GLUCOSE 103 (H) 07/29/2022    GLUCOSE NEGATIVE 07/29/2022   ,   Lab Results   Component Value Date    HGBA1C 5.2 07/29/2022         Lab Results   Component Value Date    CREATININE 1.20 07/29/2022       Lab Results   Component Value Date    TSH 1.76 11/04/2020

## 2023-01-05 NOTE — PATIENT INSTRUCTIONS
In 3 weeks increase to 2 mg weekly     Cut calories to 4781-0278 per day and smart calories      See Grace    Discussed follow up of one month for the next 3 months then may move to every two months as long as email sent with update on weight, symptoms and tracking.    Inject weekly- if significant side effects we can move to twice weekly dosing by splitting the dose. You will need extra needles if we opt for this route.        If you have severe nausea, vomitting, or abdominal pain stop your medication and call me.  Remember you can have pancreatitis or a gallbladder attack on this medication- it is rare but it can occur.  It is the main reason we dose escalate.        Ensure water intake 1-2 liters daily, veggies at least 2 meals per day.      If drinking, please reduce alcohol to 2-4 drink per week or less.  Alcohol slows how your liver digests and will lead to your food being converted to sugars as your liver is breaking down the alcohol.  It is also empty calories, raises blood pressure and blood sugar.  As well as a depressant.    Avoid sugared as well as diet beverages. Do not drink your calories.    Read labels    Do not drop below 1200 calories- your body will go into starvation mode    Ideally use a tracker such as [employer] Pal, Lose it, Chronometer or WW ady.  Goal of macros 40% carbohydrates, 30% fat and protein with smart choices     Goal exercise 30-40 min most days of the week.  If you cannot achieve this start with 10 minutes per day.  Studies show that 10 min every day is likely to build a lifestyle.  Opt for more weight lifting than cardio.  As you have afterburn all day.  Your body learns to conserve calories when you only perform cardio so with more workouts you burn less calories.  If you do intervals ie go as fast as you can then recover you can combat this.  You can use a [employer] ady.      Reduce stress, ensure enough and good sleep.  If you think you may have sleep apnea we should obtain a  sleep study as this make it more difficult to lose weight.      Monitor for constipation, goal to have a bowel movement daily or at least every other day.  If an issue with constipation please ensure you are drinking enough water and veggies- if still issues then add in anywhere from 1/2 scoop miralax to 2 scoops miralax.  You may need to play around with this and even sometimes every other day dosing is ok.      Monitor for site pain, skin thickening or redness.      If you have severe nausea, vomitting, or any other concerning symptom let me know right away.  You can hold semaglutide while waiting to discuss with me.      The more you put into this the more you will get out of it.  We are trying to establish an new lifestyle so hopefully we can taper off semaglutide.      My goal is to obtain your goal weight, maintain for 6 months then taper off.  If you consistently gain 8 pounds let me know and we will resume until back at goal weight for 6 months.      Please keep your scheduled appointments and ensure you following my recommendations.  Refills will not be given without following discharge instructions.

## 2023-01-05 NOTE — PROGRESS NOTES
Reason for visit:   Chief Complaint   Patient presents with   • Follow-up       HPI  is a 42 y.o. male     HPI      Semaglutide fu       Side effects: denies severe nausea, vomitting, constipation, or abdominal pain.  Aware if these symptoms occur to stop the medication and call me.      Tracking intake: Lose IT hitting 2000 units calories     Exercise: hurt elbow so stopped gym     Sites are without issue and rotating regularly    Reports more weight loss at home     Wt Readings from Last 3 Encounters:   01/05/23 117 kg (257 lb 12.8 oz)   12/05/22 117 kg (259 lb)   10/18/22 118 kg (260 lb)               Problem List:  Patient Active Problem List    Diagnosis Date Noted   • BMI 35.0-35.9,adult 09/20/2022   • Fatty liver 09/20/2022   • Family history of early CAD 09/15/2021   • Hypertrophic scar of skin 09/15/2021   • Hypertriglyceridemia 11/19/2020   • Erectile dysfunction 01/29/2020   • Skin tag 05/28/2019   • S/P lumbar fusion 11/21/2018   • Preop cardiovascular exam 11/19/2018   • Osteoarthritis of spine with radiculopathy, lumbar region 11/19/2018   • Asthmatic bronchitis 11/14/2018   • Mixed hyperlipidemia 04/20/2017   • Arthritis 03/23/2017   • Essential hypertension 03/23/2017   • Risk factors for obstructive sleep apnea 03/23/2017       Surgical History:  Past Surgical History:   Procedure Laterality Date   • BACK SURGERY  2018   • BACK SURGERY  01/19/2022   • EYE SURGERY Right     cataract   • KNEE ARTHROSCOPY Right    • SHOULDER ARTHROSCOPY Bilateral    • WISDOM TOOTH EXTRACTION         Social History:  Social History     Social History Narrative   • Not on file       Family History:  Family History   Problem Relation Age of Onset   • Hyperlipidemia Biological Mother    • Hypertension Biological Mother    • Stroke Biological Mother    • Heart attack Biological Mother    • Hypertension Biological Father    • Arthritis Biological Father    • Asthma Biological Brother    • Hypertension Paternal Grandmother     • Other Paternal Grandmother         complications from hip fracture   • Throat cancer Maternal Grandmother    • Parkinsonism Maternal Grandfather        Allergies:  No known allergies    Current Medications:  Current Outpatient Medications   Medication Sig Dispense Refill   • albuterol HFA (VENTOLIN HFA) 90 mcg/actuation inhaler INHALE 2 PUFFS EVERY 6 HOURS AS NEEDED FOR WHEEZING 6.7 each 3   • fluticasone propion-salmeteroL (ADVAIR DISKUS) 250-50 mcg/dose diskus inhaler Inhale 1 puff 2 (two) times a day. 180 each 1   • ibuprofen 200 mg tablet Take 200 mg by mouth every 6 (six) hours as needed for mild pain.     • montelukast (SINGULAIR) 10 mg tablet Take 1 tablet (10 mg total) by mouth See admin instr. At Night 90 tablet 1   • olmesartan-hydrochlorothiazide (BENICAR HCT) 40-12.5 mg per tablet Take 1 tablet by mouth daily. 90 tablet 1   • semaglutide Inject 1 mg under the skin every (seven) 7 days. 3 mL 0   • sildenafiL (VIAGRA) 100 mg tablet TAKE 1 TABLET BY MOUTH EVERY DAY AS NEEDED FOR ERECTILE DYSFUNCTION- no nitroglycerin 10 tablet 6     No current facility-administered medications for this visit.         Review of Systems:  Review of Systems   Constitutional: Negative for activity change and appetite change.   Gastrointestinal: Negative.    Musculoskeletal:        Elbow pain x one month stopped lifting but    C/w his prior tennis elbow  Using tens unit but not helping   Has seen ortho in the past        Objective     Vital Signs:  Vitals:    01/05/23 1436   BP: 118/78   Pulse: 71   Resp: 16   Temp: 36.4 °C (97.6 °F)   SpO2: 97%       BMI:  Body mass index is 34.96 kg/m².     Physical Exam  Constitutional:       Appearance: He is not ill-appearing or diaphoretic.   Cardiovascular:      Rate and Rhythm: Normal rate.   Abdominal:      General: There is no distension.      Tenderness: There is no abdominal tenderness.   Musculoskeletal:      Comments: Tenderness at epicondyle no swelling no warmth     Psychiatric:         Mood and Affect: Mood normal.         Thought Content: Thought content normal.         Recent labs before today:     Lab Results   Component Value Date    WBC 7.6 07/29/2022    HGB 14.6 07/29/2022    HCT 43.5 07/29/2022     07/29/2022    CHOL 198 07/29/2022    TRIG 349 (H) 07/29/2022    HDL 34 (L) 07/29/2022    ALT 77 (H) 07/29/2022    AST 33 07/29/2022     07/29/2022    K 4.7 07/29/2022     07/29/2022    CREATININE 1.20 07/29/2022    BUN 22 07/29/2022    CO2 29 07/29/2022    TSH 1.76 11/04/2020    INR 1.0 11/19/2018    GLUC Negative 03/27/2017    HGBA1C 5.2 07/29/2022       Patient Instructions   In 3 weeks increase to 2 mg weekly     Cut calories to 6615-9823 per day and smart calories      See Grace    Discussed follow up of one month for the next 3 months then may move to every two months as long as email sent with update on weight, symptoms and tracking.    Inject weekly- if significant side effects we can move to twice weekly dosing by splitting the dose. You will need extra needles if we opt for this route.        If you have severe nausea, vomitting, or abdominal pain stop your medication and call me.  Remember you can have pancreatitis or a gallbladder attack on this medication- it is rare but it can occur.  It is the main reason we dose escalate.        Ensure water intake 1-2 liters daily, veggies at least 2 meals per day.      If drinking, please reduce alcohol to 2-4 drink per week or less.  Alcohol slows how your liver digests and will lead to your food being converted to sugars as your liver is breaking down the alcohol.  It is also empty calories, raises blood pressure and blood sugar.  As well as a depressant.    Avoid sugared as well as diet beverages. Do not drink your calories.    Read labels    Do not drop below 1200 calories- your body will go into starvation mode    Ideally use a tracker such as MyFitness Pal, Lose it, Chronometer or WW ady.  Goal of  macros 40% carbohydrates, 30% fat and protein with smart choices     Goal exercise 30-40 min most days of the week.  If you cannot achieve this start with 10 minutes per day.  Studies show that 10 min every day is likely to build a lifestyle.  Opt for more weight lifting than cardio.  As you have afterburn all day.  Your body learns to conserve calories when you only perform cardio so with more workouts you burn less calories.  If you do intervals ie go as fast as you can then recover you can combat this.  You can use a [employer] ady.      Reduce stress, ensure enough and good sleep.  If you think you may have sleep apnea we should obtain a sleep study as this make it more difficult to lose weight.      Monitor for constipation, goal to have a bowel movement daily or at least every other day.  If an issue with constipation please ensure you are drinking enough water and veggies- if still issues then add in anywhere from 1/2 scoop miralax to 2 scoops miralax.  You may need to play around with this and even sometimes every other day dosing is ok.      Monitor for site pain, skin thickening or redness.      If you have severe nausea, vomitting, or any other concerning symptom let me know right away.  You can hold semaglutide while waiting to discuss with me.      The more you put into this the more you will get out of it.  We are trying to establish an new lifestyle so hopefully we can taper off semaglutide.      My goal is to obtain your goal weight, maintain for 6 months then taper off.  If you consistently gain 8 pounds let me know and we will resume until back at goal weight for 6 months.      Please keep your scheduled appointments and ensure you following my recommendations.  Refills will not be given without following discharge instructions.              If you do not hear from me regarding results in 7-10 days either via a call or the portal please call.      Problem List Items Addressed This Visit         Digestive    Fatty liver - Primary    Relevant Medications    semaglutide (OZEMPIC) 2 mg/dose (8 mg/3 mL)    Other Relevant Orders    Comprehensive metabolic panel       Endocrine/Metabolic    BMI 35.0-35.9,adult    Relevant Medications    semaglutide (OZEMPIC) 2 mg/dose (8 mg/3 mL)    Other Relevant Orders    Comprehensive metabolic panel       Other    Mixed hyperlipidemia    Relevant Orders    Lipid panel            SRUTHI Brewer  1/5/2023

## 2023-02-02 DIAGNOSIS — K76.0 FATTY LIVER: ICD-10-CM

## 2023-02-02 NOTE — TELEPHONE ENCOUNTER
Medicine Refill Request    Last Office: 1/5/2023   Last Consult Visit: 11/19/2020  Last Telemedicine Visit: Visit date not found    Next Appointment: 2/9/2023      Current Outpatient Medications:   •  albuterol HFA (VENTOLIN HFA) 90 mcg/actuation inhaler, INHALE 2 PUFFS EVERY 6 HOURS AS NEEDED FOR WHEEZING, Disp: 6.7 each, Rfl: 3  •  fluticasone propion-salmeteroL (ADVAIR DISKUS) 250-50 mcg/dose diskus inhaler, Inhale 1 puff 2 (two) times a day., Disp: 180 each, Rfl: 1  •  ibuprofen 200 mg tablet, Take 200 mg by mouth every 6 (six) hours as needed for mild pain., Disp: , Rfl:   •  montelukast (SINGULAIR) 10 mg tablet, Take 1 tablet (10 mg total) by mouth See admin instr. At Night, Disp: 90 tablet, Rfl: 1  •  olmesartan-hydrochlorothiazide (BENICAR HCT) 40-12.5 mg per tablet, Take 1 tablet by mouth daily., Disp: 90 tablet, Rfl: 1  •  semaglutide (OZEMPIC) 2 mg/dose (8 mg/3 mL), Inject 2 mg under the skin every (seven) 7 days., Disp: 3 mL, Rfl: 0  •  sildenafiL (VIAGRA) 100 mg tablet, TAKE 1 TABLET BY MOUTH EVERY DAY AS NEEDED FOR ERECTILE DYSFUNCTION- no nitroglycerin, Disp: 10 tablet, Rfl: 6      BP Readings from Last 3 Encounters:   01/05/23 118/78   12/05/22 114/80   10/18/22 122/80       Recent Lab results:  Lab Results   Component Value Date    CHOL 198 07/29/2022   ,   Lab Results   Component Value Date    HDL 34 (L) 07/29/2022   ,   Lab Results   Component Value Date    LDLCALC 114 (H) 07/29/2022   ,   Lab Results   Component Value Date    TRIG 349 (H) 07/29/2022        Lab Results   Component Value Date    GLUCOSE 103 (H) 07/29/2022    GLUCOSE NEGATIVE 07/29/2022   ,   Lab Results   Component Value Date    HGBA1C 5.2 07/29/2022         Lab Results   Component Value Date    CREATININE 1.20 07/29/2022       Lab Results   Component Value Date    TSH 1.76 11/04/2020

## 2023-02-02 NOTE — TELEPHONE ENCOUNTER
Refill request  Medication : semaglutide (OZEMPIC) 2 mg/dose (8 mg/3 mL)    Pharmacy : [facility]

## 2023-02-08 LAB
ALBUMIN SERPL-MCNC: 4.6 G/DL (ref 3.6–5.1)
ALBUMIN/GLOB SERPL: 2.1 (CALC) (ref 1–2.5)
ALP SERPL-CCNC: 72 U/L (ref 36–130)
ALT SERPL-CCNC: 37 U/L (ref 9–46)
AST SERPL-CCNC: 21 U/L (ref 10–40)
BILIRUB SERPL-MCNC: 0.5 MG/DL (ref 0.2–1.2)
BUN SERPL-MCNC: 25 MG/DL (ref 7–25)
BUN/CREAT SERPL: NORMAL (CALC) (ref 6–22)
CALCIUM SERPL-MCNC: 9.4 MG/DL (ref 8.6–10.3)
CHLORIDE SERPL-SCNC: 103 MMOL/L (ref 98–110)
CHOLEST SERPL-MCNC: 201 MG/DL
CHOLEST/HDLC SERPL: 5.3 (CALC)
CO2 SERPL-SCNC: 26 MMOL/L (ref 20–32)
CREAT SERPL-MCNC: 1.23 MG/DL (ref 0.6–1.29)
EGFRCR SERPLBLD CKD-EPI 2021: 75 ML/MIN/1.73M2
GLOBULIN SER CALC-MCNC: 2.2 G/DL (CALC) (ref 1.9–3.7)
GLUCOSE SERPL-MCNC: 98 MG/DL (ref 65–99)
HDLC SERPL-MCNC: 38 MG/DL
LDLC SERPL CALC-MCNC: 127 MG/DL (CALC)
NONHDLC SERPL-MCNC: 163 MG/DL (CALC)
POTASSIUM SERPL-SCNC: 4.3 MMOL/L (ref 3.5–5.3)
PROT SERPL-MCNC: 6.8 G/DL (ref 6.1–8.1)
SODIUM SERPL-SCNC: 139 MMOL/L (ref 135–146)
TRIGL SERPL-MCNC: 214 MG/DL

## 2023-02-09 ENCOUNTER — TELEPHONE (OUTPATIENT)
Dept: FAMILY MEDICINE | Facility: CLINIC | Age: 43
End: 2023-02-09

## 2023-02-09 ENCOUNTER — OFFICE VISIT (OUTPATIENT)
Dept: FAMILY MEDICINE | Facility: CLINIC | Age: 43
End: 2023-02-09
Payer: COMMERCIAL

## 2023-02-09 VITALS
OXYGEN SATURATION: 98 % | DIASTOLIC BLOOD PRESSURE: 80 MMHG | RESPIRATION RATE: 18 BRPM | HEART RATE: 78 BPM | SYSTOLIC BLOOD PRESSURE: 120 MMHG | TEMPERATURE: 98.3 F | HEIGHT: 72 IN | WEIGHT: 255 LBS | BODY MASS INDEX: 34.54 KG/M2

## 2023-02-09 DIAGNOSIS — K76.0 FATTY LIVER: ICD-10-CM

## 2023-02-09 DIAGNOSIS — J45.30 MILD PERSISTENT ASTHMATIC BRONCHITIS WITHOUT COMPLICATION: ICD-10-CM

## 2023-02-09 PROCEDURE — 3074F SYST BP LT 130 MM HG: CPT | Performed by: NURSE PRACTITIONER

## 2023-02-09 PROCEDURE — 3008F BODY MASS INDEX DOCD: CPT | Performed by: NURSE PRACTITIONER

## 2023-02-09 PROCEDURE — 99213 OFFICE O/P EST LOW 20 MIN: CPT | Performed by: NURSE PRACTITIONER

## 2023-02-09 PROCEDURE — 3079F DIAST BP 80-89 MM HG: CPT | Performed by: NURSE PRACTITIONER

## 2023-02-09 ASSESSMENT — ENCOUNTER SYMPTOMS
FATIGUE: 0
VOMITING: 0
ACTIVITY CHANGE: 0
DIARRHEA: 0
UNEXPECTED WEIGHT CHANGE: 0
DYSPHORIC MOOD: 0
CONSTIPATION: 1
FEVER: 0
CHILLS: 0
DIAPHORESIS: 0
ABDOMINAL PAIN: 0
NAUSEA: 0
APPETITE CHANGE: 0

## 2023-02-09 NOTE — ASSESSMENT & PLAN NOTE
9/20/2022 started semaglutide BMI 37, Weight 275 lbs   Goals improve knee pain, reduce fatty liver, clothes fit better and improve back pain   Weight down 20 lbs   Slight constipation with semaglutide at 2 mg dosing but resolves with miralax

## 2023-02-09 NOTE — PROGRESS NOTES
Reason for visit:   Chief Complaint   Patient presents with   • Follow-up     Weight check, lab review       HPI  is a 42 y.o. male     HPI  Semaglutide fu       Side effects: denies severe nausea, vomitting, constipation, or abdominal pain.  Aware if these symptoms occur to stop the medication and call me.      Tracking intake: Lose IT Marian    Exercise: cardio avoiding upper body due to elbow    Sites are without issue and rotating regularly    Wt Readings from Last 3 Encounters:   02/09/23 116 kg (255 lb)   01/05/23 117 kg (257 lb 12.8 oz)   12/05/22 117 kg (259 lb)       Problem List:  Patient Active Problem List    Diagnosis Date Noted   • BMI 35.0-35.9,adult 09/20/2022   • Fatty liver 09/20/2022   • Family history of early CAD 09/15/2021   • Hypertrophic scar of skin 09/15/2021   • Hypertriglyceridemia 11/19/2020   • Erectile dysfunction 01/29/2020   • Skin tag 05/28/2019   • S/P lumbar fusion 11/21/2018   • Preop cardiovascular exam 11/19/2018   • Osteoarthritis of spine with radiculopathy, lumbar region 11/19/2018   • Asthmatic bronchitis 11/14/2018   • Mixed hyperlipidemia 04/20/2017   • Arthritis 03/23/2017   • Essential hypertension 03/23/2017   • Risk factors for obstructive sleep apnea 03/23/2017       Surgical History:  Past Surgical History:   Procedure Laterality Date   • BACK SURGERY  2018   • BACK SURGERY  01/19/2022   • EYE SURGERY Right     cataract   • KNEE ARTHROSCOPY Right    • SHOULDER ARTHROSCOPY Bilateral    • WISDOM TOOTH EXTRACTION         Social History:  Social History     Social History Narrative   • Not on file       Family History:  Family History   Problem Relation Age of Onset   • Hyperlipidemia Biological Mother    • Hypertension Biological Mother    • Stroke Biological Mother    • Heart attack Biological Mother    • Hypertension Biological Father    • Arthritis Biological Father    • Asthma Biological Brother    • Hypertension Paternal Grandmother    • Other Paternal Grandmother          complications from hip fracture   • Throat cancer Maternal Grandmother    • Parkinsonism Maternal Grandfather        Allergies:  No known allergies    Current Medications:  Current Outpatient Medications   Medication Sig Dispense Refill   • albuterol HFA (VENTOLIN HFA) 90 mcg/actuation inhaler INHALE 2 PUFFS EVERY 6 HOURS AS NEEDED FOR WHEEZING 6.7 each 3   • fluticasone propion-salmeteroL (ADVAIR DISKUS) 250-50 mcg/dose diskus inhaler Inhale 1 puff 2 (two) times a day. 180 each 1   • ibuprofen 200 mg tablet Take 200 mg by mouth every 6 (six) hours as needed for mild pain.     • montelukast (SINGULAIR) 10 mg tablet Take 1 tablet (10 mg total) by mouth See admin instr. At Night 90 tablet 1   • olmesartan-hydrochlorothiazide (BENICAR HCT) 40-12.5 mg per tablet Take 1 tablet by mouth daily. 90 tablet 1   • semaglutide (OZEMPIC) 2 mg/dose (8 mg/3 mL) Inject 2 mg under the skin every (seven) 7 days. 3 mL 0   • sildenafiL (VIAGRA) 100 mg tablet TAKE 1 TABLET BY MOUTH EVERY DAY AS NEEDED FOR ERECTILE DYSFUNCTION- no nitroglycerin 10 tablet 6     No current facility-administered medications for this visit.         Review of Systems:  Review of Systems   Constitutional: Negative for activity change, appetite change, chills, diaphoresis, fatigue, fever and unexpected weight change.   Gastrointestinal: Positive for constipation. Negative for abdominal pain, diarrhea, nausea and vomiting.   Psychiatric/Behavioral: Negative for dysphoric mood.       Objective     Vital Signs:  Vitals:    02/09/23 1500   BP: 120/80   Pulse: 78   Resp: 18   Temp: 36.8 °C (98.3 °F)   SpO2: 98%       BMI:  Body mass index is 34.58 kg/m².     Physical Exam  Constitutional:       Appearance: Normal appearance.   Abdominal:      General: Abdomen is flat. Bowel sounds are normal.      Palpations: Abdomen is soft.      Tenderness: There is no abdominal tenderness.   Neurological:      Mental Status: He is alert.   Psychiatric:         Mood and  Affect: Mood normal.         Behavior: Behavior normal.         Recent labs before today:     Lab Results   Component Value Date    WBC 7.6 07/29/2022    HGB 14.6 07/29/2022    HCT 43.5 07/29/2022     07/29/2022    CHOL 201 (H) 02/08/2023    TRIG 214 (H) 02/08/2023    HDL 38 (L) 02/08/2023    ALT 37 02/08/2023    AST 21 02/08/2023     02/08/2023    K 4.3 02/08/2023     02/08/2023    CREATININE 1.23 02/08/2023    BUN 25 02/08/2023    CO2 26 02/08/2023    TSH 1.76 11/04/2020    INR 1.0 11/19/2018    GLUC Negative 03/27/2017    HGBA1C 5.2 07/29/2022       Patient Instructions   Default thinking     Discussed follow up of one month for the next 3 months then may move to every two months as long as email sent with update on weight, symptoms and tracking.    Inject weekly- if significant side effects we can move to twice weekly dosing by splitting the dose. You will need extra needles if we opt for this route.        If you have severe nausea, vomitting, or abdominal pain stop your medication and call me.  Remember you can have pancreatitis or a gallbladder attack on this medication- it is rare but it can occur.  It is the main reason we dose escalate.        Ensure water intake 1-2 liters daily, veggies at least 2 meals per day.      If drinking, please reduce alcohol to 2-4 drink per week or less.  Alcohol slows how your liver digests and will lead to your food being converted to sugars as your liver is breaking down the alcohol.  It is also empty calories, raises blood pressure and blood sugar.  As well as a depressant.    Avoid sugared as well as diet beverages. Do not drink your calories.    Read labels    Do not drop below 1200 calories- your body will go into starvation mode    Ideally use a tracker such as [employer] Pal, Lose it, Chronometer or [employer] ady.  Goal of macros 40% carbohydrates, 30% fat and protein with smart choices     Goal exercise 30-40 min most days of the week.  If you cannot  achieve this start with 10 minutes per day.  Studies show that 10 min every day is likely to build a lifestyle.  Opt for more weight lifting than cardio.  As you have afterburn all day.  Your body learns to conserve calories when you only perform cardio so with more workouts you burn less calories.  If you do intervals ie go as fast as you can then recover you can combat this.  You can use a [employer] ady.      Reduce stress, ensure enough and good sleep.  If you think you may have sleep apnea we should obtain a sleep study as this make it more difficult to lose weight.      Monitor for constipation, goal to have a bowel movement daily or at least every other day.  If an issue with constipation please ensure you are drinking enough water and veggies- if still issues then add in anywhere from 1/2 scoop miralax to 2 scoops miralax.  You may need to play around with this and even sometimes every other day dosing is ok.      Monitor for site pain, skin thickening or redness.      If you have severe nausea, vomitting, or any other concerning symptom let me know right away.  You can hold semaglutide while waiting to discuss with me.      The more you put into this the more you will get out of it.  We are trying to establish an new lifestyle so hopefully we can taper off semaglutide.      My goal is to obtain your goal weight, maintain for 6 months then taper off.  If you consistently gain 8 pounds let me know and we will resume until back at goal weight for 6 months.      Please keep your scheduled appointments and ensure you following my recommendations.  Refills will not be given without following discharge instructions.              If you do not hear from me regarding results in 7-10 days either via a call or the portal please call.      Problem List Items Addressed This Visit        Respiratory    Asthmatic bronchitis       Digestive    Fatty liver    Relevant Medications    semaglutide (OZEMPIC) 2 mg/dose (8 mg/3 mL)        Endocrine/Metabolic    BMI 34.0-34.9,adult - Primary     9/20/2022 started semaglutide BMI 37, Weight 275 lbs   Goals improve knee pain, reduce fatty liver, clothes fit better and improve back pain   Weight down 20 lbs   Slight constipation with semaglutide at 2 mg dosing but resolves with miralax          Relevant Medications    semaglutide (OZEMPIC) 2 mg/dose (8 mg/3 mL)   Other Visit Diagnoses     BMI 35.0-35.9,adult        Relevant Medications    semaglutide (OZEMPIC) 2 mg/dose (8 mg/3 mL)      Liver functions normal for the first time in 3 years!!!!!!!    Continue medication   Repeat liver US end of the year              SRUTHI Brewer  2/9/2023

## 2023-02-09 NOTE — PATIENT INSTRUCTIONS
Default thinking     Discussed follow up of one month for the next 3 months then may move to every two months as long as email sent with update on weight, symptoms and tracking.    Inject weekly- if significant side effects we can move to twice weekly dosing by splitting the dose. You will need extra needles if we opt for this route.        If you have severe nausea, vomitting, or abdominal pain stop your medication and call me.  Remember you can have pancreatitis or a gallbladder attack on this medication- it is rare but it can occur.  It is the main reason we dose escalate.        Ensure water intake 1-2 liters daily, veggies at least 2 meals per day.      If drinking, please reduce alcohol to 2-4 drink per week or less.  Alcohol slows how your liver digests and will lead to your food being converted to sugars as your liver is breaking down the alcohol.  It is also empty calories, raises blood pressure and blood sugar.  As well as a depressant.    Avoid sugared as well as diet beverages. Do not drink your calories.    Read labels    Do not drop below 1200 calories- your body will go into starvation mode    Ideally use a tracker such as MyFitness Pal, Lose it, Chronometer or [employer]amica ady.  Goal of macros 40% carbohydrates, 30% fat and protein with smart choices     Goal exercise 30-40 min most days of the week.  If you cannot achieve this start with 10 minutes per day.  Studies show that 10 min every day is likely to build a lifestyle.  Opt for more weight lifting than cardio.  As you have afterburn all day.  Your body learns to conserve calories when you only perform cardio so with more workouts you burn less calories.  If you do intervals ie go as fast as you can then recover you can combat this.  You can use a tibata ady.      Reduce stress, ensure enough and good sleep.  If you think you may have sleep apnea we should obtain a sleep study as this make it more difficult to lose weight.      Monitor for constipation,  goal to have a bowel movement daily or at least every other day.  If an issue with constipation please ensure you are drinking enough water and veggies- if still issues then add in anywhere from 1/2 scoop miralax to 2 scoops miralax.  You may need to play around with this and even sometimes every other day dosing is ok.      Monitor for site pain, skin thickening or redness.      If you have severe nausea, vomitting, or any other concerning symptom let me know right away.  You can hold semaglutide while waiting to discuss with me.      The more you put into this the more you will get out of it.  We are trying to establish an new lifestyle so hopefully we can taper off semaglutide.      My goal is to obtain your goal weight, maintain for 6 months then taper off.  If you consistently gain 8 pounds let me know and we will resume until back at goal weight for 6 months.      Please keep your scheduled appointments and ensure you following my recommendations.  Refills will not be given without following discharge instructions.

## 2023-02-15 ENCOUNTER — TRANSCRIBE ORDERS (OUTPATIENT)
Dept: SCHEDULING | Age: 43
End: 2023-02-15

## 2023-02-15 DIAGNOSIS — M77.11 LATERAL EPICONDYLITIS, RIGHT ELBOW: Primary | ICD-10-CM

## 2023-02-20 ENCOUNTER — TELEPHONE (OUTPATIENT)
Dept: FAMILY MEDICINE | Facility: CLINIC | Age: 43
End: 2023-02-20
Payer: COMMERCIAL

## 2023-02-20 NOTE — TELEPHONE ENCOUNTER
Cover my meds is requesting a call back regarding prior auth for Ozempic medication. Call back # is [phone] and key # [id number].

## 2023-02-20 NOTE — TELEPHONE ENCOUNTER
TC to Cover My Meds. [place] will be closed out. Ozempic 2 mg was dispensed on 2/2/23 at [facility].

## 2023-02-22 ENCOUNTER — HOSPITAL ENCOUNTER (OUTPATIENT)
Dept: RADIOLOGY | Facility: HOSPITAL | Age: 43
Discharge: HOME | End: 2023-02-22
Attending: SPECIALIST
Payer: COMMERCIAL

## 2023-02-22 VITALS — WEIGHT: 245 LBS | BODY MASS INDEX: 33.23 KG/M2

## 2023-02-22 DIAGNOSIS — M77.11 LATERAL EPICONDYLITIS, RIGHT ELBOW: ICD-10-CM

## 2023-03-16 ENCOUNTER — TRANSCRIBE ORDERS (OUTPATIENT)
Dept: LAB | Facility: HOSPITAL | Age: 43
End: 2023-03-16

## 2023-03-16 ENCOUNTER — APPOINTMENT (OUTPATIENT)
Dept: LAB | Facility: HOSPITAL | Age: 43
End: 2023-03-16
Attending: PHYSICIAN ASSISTANT
Payer: COMMERCIAL

## 2023-03-16 DIAGNOSIS — Z01.818 ENCOUNTER FOR OTHER PREPROCEDURAL EXAMINATION: ICD-10-CM

## 2023-03-16 DIAGNOSIS — Z01.818 ENCOUNTER FOR OTHER PREPROCEDURAL EXAMINATION: Primary | ICD-10-CM

## 2023-03-16 PROCEDURE — 80053 COMPREHEN METABOLIC PANEL: CPT

## 2023-03-16 PROCEDURE — 85027 COMPLETE CBC AUTOMATED: CPT

## 2023-03-16 PROCEDURE — 36415 COLL VENOUS BLD VENIPUNCTURE: CPT

## 2023-03-17 ENCOUNTER — TRANSCRIBE ORDERS (OUTPATIENT)
Dept: REGISTRATION | Facility: HOSPITAL | Age: 43
End: 2023-03-17

## 2023-03-17 ENCOUNTER — HOSPITAL ENCOUNTER (OUTPATIENT)
Dept: CARDIOLOGY | Facility: HOSPITAL | Age: 43
Discharge: HOME | End: 2023-03-17
Attending: PHYSICIAN ASSISTANT
Payer: COMMERCIAL

## 2023-03-17 DIAGNOSIS — J45.30 MILD PERSISTENT ASTHMATIC BRONCHITIS WITHOUT COMPLICATION: ICD-10-CM

## 2023-03-17 DIAGNOSIS — Z01.818 ENCOUNTER FOR OTHER PREPROCEDURAL EXAMINATION: Primary | ICD-10-CM

## 2023-03-17 DIAGNOSIS — Z01.818 ENCOUNTER FOR OTHER PREPROCEDURAL EXAMINATION: ICD-10-CM

## 2023-03-17 LAB
ALBUMIN SERPL-MCNC: 4.5 G/DL (ref 3.4–5)
ALP SERPL-CCNC: 72 IU/L (ref 35–126)
ALT SERPL-CCNC: 50 IU/L (ref 16–63)
ANION GAP SERPL CALC-SCNC: 11 MEQ/L (ref 3–15)
AST SERPL-CCNC: 27 IU/L (ref 15–41)
ATRIAL RATE: 81
BILIRUB SERPL-MCNC: 0.5 MG/DL (ref 0.3–1.2)
BUN SERPL-MCNC: 16 MG/DL (ref 8–20)
CALCIUM SERPL-MCNC: 9.3 MG/DL (ref 8.9–10.3)
CHLORIDE SERPL-SCNC: 102 MEQ/L (ref 98–109)
CO2 SERPL-SCNC: 25 MEQ/L (ref 22–32)
CREAT SERPL-MCNC: 1.1 MG/DL (ref 0.8–1.3)
ERYTHROCYTE [DISTWIDTH] IN BLOOD BY AUTOMATED COUNT: 12.9 % (ref 11.6–14.4)
GFR SERPL CREATININE-BSD FRML MDRD: >60 ML/MIN/1.73M*2
GLUCOSE SERPL-MCNC: 90 MG/DL (ref 70–99)
HCT VFR BLDCO AUTO: 43.5 % (ref 40.1–51)
HGB BLD-MCNC: 14.8 G/DL (ref 13.7–17.5)
MCH RBC QN AUTO: 28.7 PG (ref 28–33.2)
MCHC RBC AUTO-ENTMCNC: 34 G/DL (ref 32.2–36.5)
MCV RBC AUTO: 84.5 FL (ref 83–98)
P AXIS: -16
PDW BLD AUTO: 10.2 FL (ref 9.4–12.4)
PLATELET # BLD AUTO: 226 K/UL (ref 150–350)
POTASSIUM SERPL-SCNC: 3.9 MEQ/L (ref 3.6–5.1)
PR INTERVAL: 132
PROT SERPL-MCNC: 6.9 G/DL (ref 6–8.2)
QRS DURATION: 100
QT INTERVAL: 372
QTC CALCULATION(BAZETT): 432
R AXIS: 53
RBC # BLD AUTO: 5.15 M/UL (ref 4.5–5.8)
SODIUM SERPL-SCNC: 138 MEQ/L (ref 136–144)
T WAVE AXIS: 22
VENTRICULAR RATE: 81
WBC # BLD AUTO: 8.1 K/UL (ref 3.8–10.5)

## 2023-03-17 PROCEDURE — 93005 ELECTROCARDIOGRAM TRACING: CPT

## 2023-03-17 RX ORDER — MONTELUKAST SODIUM 10 MG/1
10 TABLET ORAL SEE ADMIN INSTRUCTIONS
Qty: 90 TABLET | Refills: 0 | Status: SHIPPED | OUTPATIENT
Start: 2023-03-17 | End: 2024-10-28 | Stop reason: SDUPTHER

## 2023-03-17 NOTE — TELEPHONE ENCOUNTER
Medicine Refill Request    Last Office: 2/9/2023   Last Consult Visit: 11/19/2020  Last Telemedicine Visit: Visit date not found    Next Appointment: 4/6/2023      Current Outpatient Medications:   •  albuterol HFA (VENTOLIN HFA) 90 mcg/actuation inhaler, INHALE 2 PUFFS EVERY 6 HOURS AS NEEDED FOR WHEEZING, Disp: 6.7 each, Rfl: 3  •  fluticasone propion-salmeteroL (ADVAIR DISKUS) 250-50 mcg/dose diskus inhaler, Inhale 1 puff 2 (two) times a day., Disp: 180 each, Rfl: 1  •  ibuprofen 200 mg tablet, Take 200 mg by mouth every 6 (six) hours as needed for mild pain., Disp: , Rfl:   •  montelukast (SINGULAIR) 10 mg tablet, Take 1 tablet (10 mg total) by mouth See admin instr. At Night, Disp: 90 tablet, Rfl: 1  •  olmesartan-hydrochlorothiazide (BENICAR HCT) 40-12.5 mg per tablet, Take 1 tablet by mouth daily., Disp: 90 tablet, Rfl: 1  •  semaglutide (OZEMPIC) 2 mg/dose (8 mg/3 mL), Inject 2 mg under the skin every (seven) 7 days., Disp: 9 mL, Rfl: 0  •  sildenafiL (VIAGRA) 100 mg tablet, TAKE 1 TABLET BY MOUTH EVERY DAY AS NEEDED FOR ERECTILE DYSFUNCTION- no nitroglycerin, Disp: 10 tablet, Rfl: 6      BP Readings from Last 3 Encounters:   02/09/23 120/80   01/05/23 118/78   12/05/22 114/80       Recent Lab results:  Lab Results   Component Value Date    CHOL 201 (H) 02/08/2023   ,   Lab Results   Component Value Date    HDL 38 (L) 02/08/2023   ,   Lab Results   Component Value Date    LDLCALC 127 (H) 02/08/2023   ,   Lab Results   Component Value Date    TRIG 214 (H) 02/08/2023        Lab Results   Component Value Date    GLUCOSE 90 03/16/2023   ,   Lab Results   Component Value Date    HGBA1C 5.2 07/29/2022         Lab Results   Component Value Date    CREATININE 1.1 03/16/2023       Lab Results   Component Value Date    TSH 1.76 11/04/2020

## 2023-05-23 ENCOUNTER — OFFICE VISIT (OUTPATIENT)
Dept: FAMILY MEDICINE | Facility: CLINIC | Age: 43
End: 2023-05-23
Payer: COMMERCIAL

## 2023-05-23 VITALS
WEIGHT: 255 LBS | RESPIRATION RATE: 16 BRPM | SYSTOLIC BLOOD PRESSURE: 112 MMHG | DIASTOLIC BLOOD PRESSURE: 80 MMHG | BODY MASS INDEX: 34.54 KG/M2 | HEIGHT: 72 IN | HEART RATE: 76 BPM | TEMPERATURE: 98.7 F | OXYGEN SATURATION: 98 %

## 2023-05-23 DIAGNOSIS — E78.2 MIXED HYPERLIPIDEMIA: Primary | ICD-10-CM

## 2023-05-23 PROCEDURE — 3008F BODY MASS INDEX DOCD: CPT | Performed by: STUDENT IN AN ORGANIZED HEALTH CARE EDUCATION/TRAINING PROGRAM

## 2023-05-23 PROCEDURE — 99214 OFFICE O/P EST MOD 30 MIN: CPT | Performed by: STUDENT IN AN ORGANIZED HEALTH CARE EDUCATION/TRAINING PROGRAM

## 2023-05-23 PROCEDURE — 3079F DIAST BP 80-89 MM HG: CPT | Performed by: STUDENT IN AN ORGANIZED HEALTH CARE EDUCATION/TRAINING PROGRAM

## 2023-05-23 PROCEDURE — 3074F SYST BP LT 130 MM HG: CPT | Performed by: STUDENT IN AN ORGANIZED HEALTH CARE EDUCATION/TRAINING PROGRAM

## 2023-05-23 NOTE — PROGRESS NOTES
PSR spoke to patient and scheduled him on 5/25.   Patient: Andrew Thrasher  Location: [address]  MRN: [id number]  Today's date: 11/21/2018    Left patient supine in recliner, call bell within reach, vss/NAD, SCD's applied, RN aware        Therapy Pain/Vitals - 11/21/18 1618        Pain/Comfort/Sleep    Pain Charting Type Pain Assessment    Presence of Pain complains of pain/discomfort    Preferred Pain Scale word (verbal rating pain scale)    Pain Body Location back    Pain Rating: Rest 2 - mild pain    Pain Rating: Activity 4 - moderate pain    Pain Management Interventions pain pump in use       Vital Signs    Pulse 87    SpO2 99 %    Patient Activity At rest    Oxygen Therapy Supplemental oxygen    O2 Delivery Method Nasal cannula    O2 Flow Rate (L/min) 3 L/min    /66    Patient Position Lying          Prior Living Environment  Lives With: spouse, child(bisi), dependent  Living Arrangements: house  Home Accessibility: stairs to enter home  Living Environment Comment: 1SH, 2 PRINCESS with B railing Equipment Currently Used at Home: none       Prior Level of Function  Ambulation: independent  Transferring: independent  Toileting: independent  Bathing: independent  Dressing: independent  Eating: independent  Communication: understands/communicates without difficulty  Swallowing: swallows foods/liquids without difficulty  Equipment Currently Used at Home: none           PT Evaluation - 11/21/18 1635        Session Details    Document Type initial evaluation    Mode of Treatment physical therapy       Time Calculation    Start Time 1618    Stop Time 1635    Time Calculation (min) 17 min       General Information    Patient Profile Reviewed? yes    Onset of Illness/Injury or Date of Surgery 11/21/18    Referring Physician Mao    Pertinent History of Current Functional Problem s/p L5-S1 PLDF    Existing Precautions/Restrictions fall;spinal       Coping Strategies    Trust Relationship/Rapport care explained       Orientation Log    [facility]  3-->spontaneous/free recall    Kind of Place 3-->spontaneous/free recall    Name of Hospital 3-->spontaneous/free recall    Month 3-->spontaneous/free recall    Date 3-->spontaneous/free recall    Year 3-->spontaneous/free recall    Day of Week 3-->spontaneous/free recall    Clock Time 3-->spontaneous/free recall    Etiology/Event 3-->spontaneous/free recall    Pathology Deficits 3-->spontaneous/free recall    Total Score 30       Cognition/Psychosocial    Safety Awareness intact       Sensory    Sensory General Assessment no sensation deficits identified       Range of Motion (ROM)    General Range of Motion no range of motion deficits identified       Manual Muscle Testing (MMT)    General MMT Assessment no strength deficits identified       Bed Mobility    [place], Roll Left minimum assist (75% patient effort)    [place], Supine to Sit minimum assist (75% patient effort);1 person assist    Comment (Bed Mobility) use of bed rail and HOB elevated       Sit to Stand Transfer    [place], Sit to Stand Transfer minimum assist (75% patient effort);verbal cues    Verbal Cues technique;hand placement       Stand to Sit Transfer    [place], Stand to Sit Transfer close supervision       Gait Analysis/Training    Gait/Stairs Locomotion Gait Training (Group)       Gait Training    [place], Gait close supervision    Assistive Device other (see comments)   IV pole    Distance in Feet 60 feet    Gait Pattern Utilized step-through    Gait Deviations Identified narrow base of support;decreased lesley;decreased gait speed       Stairs Training    [place], Stairs not tested       AM-PAC (TM) - Mobility (Current Function)    Turning from your back to your side while in a flat bed without using bedrails? 3 - A Little    Moving from lying on your back to sitting on the side of a flat bed without using bedrails? 3 - A Little    Moving to and from a bed to a chair? 3 - A Little    Standing up from a chair  using your arms? 3 - A Little    To walk in a hospital room? 3 - A Little    Climbing 3-5 steps with a railing? 3 - A Little    AM-PAC (TM) Mobility Score 18       PT Clinical Impression    Patient's Goals For Discharge return home;take care of myself at home    Plan For Care Reviewed: Physical Therapy PT plan for care discussed with patient    System Pathology/Pathophysiology Noted musculoskeletal    Impairments Found (PT Eval) gait, locomotion, and balance    Functional Limitations in Following Categories (PT Eval) self-care;home management;work    Disability: Inability to Perform Actions/Activities of Required Roles work    Rehab Potential/Prognosis good, to achieve stated therapy goals    PT Frequency of Treatment 5-7 times per week    Problem List impaired balance;pain    Activity Limitations Related to Problem List ambulation not performed safely;functional mobility not performed adequately or safely for household activity    Anticipated Equipment Needs at Discharge none    Daily Outcome Statement Pt s/p L5-S1 PLDF, cl S with mobility.          Pain Assessment/Intervention  Pain Charting Type: Pain Assessment             Education provided this session. See the Patient Education summary report for full details.    PT Care Plan Goals      Most Recent Value   Stair Goal, PT   PT STG: Stairs  modified independence   PT STG: Number of Stairs  2      PT Care Plan Goals      Most Recent Value   Bed Mobility Goal   Date Goal Established: Bed Mobility  11/21/18   Time to Achieve Goal: Bed Mobility  2 days   Goal Activity: Bed Mobility  all bed mobility activities   Level of [place] Goal: Bed Mobility  modified independence   Gait Goal   Date Goal Established: Gait Training  11/21/18   Time to Achieve Goal: Gait Training  2 days   Level of [place]  modified independence   Distance Goal: Gait Training (feet)  200 feet   Transfer Goal   Date Goal Established: Transfer Training  11/21/18   Time to Achieve Goal:  Transfer Training  2 days   Goal Activity: Transfer Training  all transfers   Level of [place] Goal: Transfer Training  modified independence

## 2023-05-23 NOTE — PROGRESS NOTES
Marion Beltrán D.O.  [facility]  [address]  [address]  [place] [address]  [phone]      HISTORY OF PRESENT ILLNESS        Chief Complaint   Patient presents with   • Establish Care        HPI:  Andrew Thrasher is a 43 y.o. male who presents to discuss the following.    # Weight management  Has been on wegovy 2 mL  Has not been to the gym lately  Diet has been better, less red meat, more chicken, cut back on potatoes, continuing to eat vegetables, cut back on soda at lunch, glad of juice or ice team in the morning, sometimes for dinner, mostly cut sweets all the way out    Works as   Going through divorce for last 7 months  Has 17 and 18 year old sons    Current Outpatient Medications on File Prior to Visit   Medication Sig   • albuterol HFA (VENTOLIN HFA) 90 mcg/actuation inhaler INHALE 2 PUFFS EVERY 6 HOURS AS NEEDED FOR WHEEZING   • fluticasone propion-salmeteroL (ADVAIR DISKUS) 250-50 mcg/dose diskus inhaler Inhale 1 puff 2 (two) times a day.   • montelukast (SINGULAIR) 10 mg tablet Take 1 tablet (10 mg total) by mouth See admin instr. At Night   • olmesartan-hydrochlorothiazide (BENICAR HCT) 40-12.5 mg per tablet Take 1 tablet by mouth daily.   • semaglutide (OZEMPIC) 2 mg/dose (8 mg/3 mL) Inject 2 mg under the skin every (seven) 7 days.   • sildenafiL (VIAGRA) 100 mg tablet TAKE 1 TABLET BY MOUTH EVERY DAY AS NEEDED FOR ERECTILE DYSFUNCTION- no nitroglycerin   • ibuprofen 200 mg tablet Take 200 mg by mouth every 6 (six) hours as needed for mild pain.     No current facility-administered medications on file prior to visit.        ROS        All systems reviewed and negative except as otherwise stated in HPI   PHYSICAL EXAMINATION      Visit Vitals  /80 (BP Location: Left upper arm, Patient Position: Sitting)   Pulse 76   Temp 37.1 °C (98.7 °F) (Temporal)   Resp 16   Ht 1.829 m (6')   Wt 116 kg (255 lb)   SpO2 98%   BMI 34.58 kg/m²        Body mass  index is 34.58 kg/m².     Wt Readings from Last 3 Encounters:   05/23/23 116 kg (255 lb)   02/22/23 111 kg (245 lb)   02/09/23 116 kg (255 lb)      BMI Readings from Last 3 Encounters:   05/23/23 34.58 kg/m²   02/22/23 33.23 kg/m²   02/09/23 34.58 kg/m²        Physical Exam  Vitals reviewed.   Constitutional:       General: He is not in acute distress.     Appearance: Normal appearance. He is not ill-appearing or toxic-appearing.   Cardiovascular:      Rate and Rhythm: Normal rate and regular rhythm.      Heart sounds: No murmur heard.  Pulmonary:      Effort: Pulmonary effort is normal. No respiratory distress.      Breath sounds: No stridor. No wheezing, rhonchi or rales.   Musculoskeletal:      Right lower leg: No edema.      Left lower leg: No edema.   Neurological:      Mental Status: He is alert.          ASSESSMENT AND PLAN   Diagnoses and all orders for this visit:    Mixed hyperlipidemia (Primary)  -     Comprehensive metabolic panel; Future  -     Lipid panel; Future    BMI 34.0-34.9,adult  Assessment & Plan:  Gained 10 pounds since prior visit, possibly related to decreased exercise. Tolerating medication. Will resume exercise and reevaluate at follow up in 6 months. Will follow up repeat lipid panel           Current Outpatient Medications:   •  albuterol HFA (VENTOLIN HFA) 90 mcg/actuation inhaler, INHALE 2 PUFFS EVERY 6 HOURS AS NEEDED FOR WHEEZING, Disp: 6.7 each, Rfl: 3  •  fluticasone propion-salmeteroL (ADVAIR DISKUS) 250-50 mcg/dose diskus inhaler, Inhale 1 puff 2 (two) times a day., Disp: 180 each, Rfl: 1  •  montelukast (SINGULAIR) 10 mg tablet, Take 1 tablet (10 mg total) by mouth See admin instr. At Night, Disp: 90 tablet, Rfl: 0  •  olmesartan-hydrochlorothiazide (BENICAR HCT) 40-12.5 mg per tablet, Take 1 tablet by mouth daily., Disp: 90 tablet, Rfl: 1  •  semaglutide (OZEMPIC) 2 mg/dose (8 mg/3 mL), Inject 2 mg under the skin every (seven) 7 days., Disp: 9 mL, Rfl: 0  •  sildenafiL (VIAGRA)  100 mg tablet, TAKE 1 TABLET BY MOUTH EVERY DAY AS NEEDED FOR ERECTILE DYSFUNCTION- no nitroglycerin, Disp: 10 tablet, Rfl: 6  •  ibuprofen 200 mg tablet, Take 200 mg by mouth every 6 (six) hours as needed for mild pain., Disp: , Rfl:      Return in about 6 months (around 11/23/2023) for Wegovy follow up.     I spent 35 minutes on this date of service performing the following activities: obtaining history, performing examination, entering orders, documenting, preparing for visit, obtaining / reviewing records, providing counseling and education and independently reviewing study/studies.      Marion Beltrán,   5/23/2023

## 2023-05-23 NOTE — ASSESSMENT & PLAN NOTE
Gained 10 pounds since prior visit, possibly related to decreased exercise. Tolerating medication. Will resume exercise and reevaluate at follow up in 6 months. Will follow up repeat lipid panel

## 2023-06-09 ENCOUNTER — TELEPHONE (OUTPATIENT)
Dept: FAMILY MEDICINE | Facility: CLINIC | Age: 43
End: 2023-06-09
Payer: COMMERCIAL

## 2023-06-09 DIAGNOSIS — K76.0 FATTY LIVER: ICD-10-CM

## 2023-06-09 NOTE — TELEPHONE ENCOUNTER
Patient's pharmacy called to request a refill of the following medication:    Ozempic 2mg    Pharm[facility] is confirmed to be [facility]    Please call [facility] with any questions at [phone]    Thank you!  Sofia

## 2023-09-21 DIAGNOSIS — K76.0 FATTY LIVER: ICD-10-CM

## 2023-09-21 RX ORDER — SEMAGLUTIDE 2.68 MG/ML
INJECTION, SOLUTION SUBCUTANEOUS
Qty: 9 ML | Refills: 0 | Status: SHIPPED | OUTPATIENT
Start: 2023-09-21 | End: 2024-01-04

## 2023-09-22 ENCOUNTER — TRANSCRIBE ORDERS (OUTPATIENT)
Dept: SCHEDULING | Age: 43
End: 2023-09-22

## 2023-09-22 DIAGNOSIS — M48.062 SPINAL STENOSIS, LUMBAR REGION WITH NEUROGENIC CLAUDICATION: Primary | ICD-10-CM

## 2023-09-22 DIAGNOSIS — M54.50 LOW BACK PAIN, UNSPECIFIED: ICD-10-CM

## 2023-09-30 DIAGNOSIS — I10 ESSENTIAL HYPERTENSION: ICD-10-CM

## 2023-10-02 RX ORDER — OLMESARTAN MEDOXOMIL AND HYDROCHLOROTHIAZIDE 40/12.5 40; 12.5 MG/1; MG/1
1 TABLET ORAL DAILY
Qty: 90 TABLET | Refills: 1 | Status: SHIPPED | OUTPATIENT
Start: 2023-10-02 | End: 2024-04-01

## 2023-10-03 DIAGNOSIS — N52.8 OTHER MALE ERECTILE DYSFUNCTION: ICD-10-CM

## 2023-10-03 RX ORDER — SILDENAFIL 100 MG/1
TABLET, FILM COATED ORAL
Qty: 10 TABLET | Refills: 6 | Status: SHIPPED | OUTPATIENT
Start: 2023-10-03 | End: 2024-05-13

## 2023-10-05 ENCOUNTER — HOSPITAL ENCOUNTER (OUTPATIENT)
Dept: RADIOLOGY | Facility: HOSPITAL | Age: 43
Discharge: HOME | End: 2023-10-05
Attending: ORTHOPAEDIC SURGERY
Payer: COMMERCIAL

## 2023-10-05 DIAGNOSIS — M54.50 LOW BACK PAIN, UNSPECIFIED: ICD-10-CM

## 2023-10-05 DIAGNOSIS — M48.062 SPINAL STENOSIS, LUMBAR REGION WITH NEUROGENIC CLAUDICATION: ICD-10-CM

## 2023-10-05 RX ORDER — GADOBUTROL 604.72 MG/ML
0.1 INJECTION INTRAVENOUS ONCE
Status: COMPLETED | OUTPATIENT
Start: 2023-10-05 | End: 2023-10-05

## 2023-10-05 RX ADMIN — GADOBUTROL 11.1 ML: 604.72 INJECTION INTRAVENOUS at 17:16

## 2023-11-10 NOTE — TELEPHONE ENCOUNTER
Chief Complaint   Patient presents with    Chest Pain       Subjective     Anisa Murillo is a 65 y.o. female who presents today with family history of stroke in her sister and mother    She is doing well over the past year tolerating statin well        Past Medical History:   Diagnosis Date    Anxiety 3/24/2015    Contusion 10/26/2015    Eczema 3/22/2016    Herpes simplex type 1 infection 10/26/2015    Hypertension     Insomnia     Osteopenia 3/24/2015     Past Surgical History:   Procedure Laterality Date    NM BREAST AUGMENTATION WITH IMPLANT       Family History   Problem Relation Age of Onset    Cancer Mother         breast     Diabetes Mother     Heart Disease Mother 80        carotid disease and CEA    Stroke Father     Stroke Sister     Genetic Disorder Son         autism      Social History     Socioeconomic History    Marital status: Single     Spouse name: Not on file    Number of children: Not on file    Years of education: Not on file    Highest education level: Not on file   Occupational History    Not on file   Tobacco Use    Smoking status: Former     Current packs/day: 0.00     Types: Cigarettes    Smokeless tobacco: Never   Vaping Use    Vaping Use: Never used   Substance and Sexual Activity    Alcohol use: Yes     Alcohol/week: 3.6 oz     Types: 6 Standard drinks or equivalent per week     Comment: 2x a week    Drug use: Not Currently     Comment: history of    Sexual activity: Yes     Partners: Male     Birth control/protection: Post-Menopausal   Other Topics Concern    Not on file   Social History Narrative    Not on file     Social Determinants of Health     Financial Resource Strain: Not on file   Food Insecurity: Not on file   Transportation Needs: Not on file   Physical Activity: Not on file   Stress: Not on file   Social Connections: Not on file   Intimate Partner Violence: Not on file   Housing Stability: Not on file     No Known Allergies  Outpatient Encounter Medications as of  Pt has return OV late James   "11/10/2023   Medication Sig Dispense Refill    ciclopirox (PENLAC) 8 % solution APPLY TO CLEAN TOENAILS ONCE A DAY, CLEAN NAILS WITH ALCOHOL WIPE ONCE A WEEK.      desonide (DESOWEN) 0.05 % ointment APPLY TO AFFECTED AREA TWICE A DAY WHEN FLARING      ketoconazole (NIZORAL) 2 % shampoo WASH AFFECTED AREAS ON BODY ONCE A DAY AS NEEDED      XIIDRA 5 % Solution PLACE 1 DROP INTO BOTH EYES TWICE A DAY      atorvastatin (LIPITOR) 20 MG Tab Take 1 Tablet by mouth every day. 90 Tablet 3    diclofenac sodium (VOLTAREN) 1 % Gel APPLY TWICE DAILY AS NEEDED TO AFFECTED AREA      valACYclovir (VALTREX) 500 MG Tab Take 500 mg by mouth 1 time a day as needed.  3    Multiple Vitamin (MULTI-VITAMINS PO) Take  by mouth.      Ascorbic Acid (CORNELIO-C PO) Take  by mouth.      FIBER SELECT GUMMIES PO Take  by mouth.      vitamin D (CHOLECALCIFEROL) 1000 UNIT TABS Take 2,000 Units by mouth every day.      Calcium-Vitamin D (CALCIUM + D PO) Take  by mouth.       No facility-administered encounter medications on file as of 11/10/2023.     ROS           Objective     /78 (BP Location: Left arm, Patient Position: Sitting, BP Cuff Size: Adult)   Pulse 86   Resp 16   Ht 1.549 m (5' 1\")   Wt 49 kg (108 lb)   SpO2 97%   BMI 20.41 kg/m²     Physical Exam  Constitutional:       General: She is not in acute distress.     Appearance: She is not diaphoretic.   Eyes:      General: No scleral icterus.  Neck:      Vascular: No JVD.   Cardiovascular:      Rate and Rhythm: Normal rate.      Heart sounds: Normal heart sounds. No murmur heard.     No friction rub. No gallop.   Pulmonary:      Effort: No respiratory distress.      Breath sounds: No wheezing or rales.   Abdominal:      General: Bowel sounds are normal.      Palpations: Abdomen is soft.   Musculoskeletal:      Right lower leg: No edema.      Left lower leg: No edema.   Skin:     Findings: No rash.   Neurological:      Mental Status: She is alert. Mental status is at baseline. "   Psychiatric:         Mood and Affect: Mood normal.          We reviewed in person the most recent labs  Recent Results (from the past 5040 hour(s))   CBC WITHOUT DIFFERENTIAL    Collection Time: 10/25/23  4:22 AM   Result Value Ref Range    WBC 4.7 3.4 - 10.8 x10E3/uL    RBC 4.32 3.77 - 5.28 x10E6/uL    Hemoglobin 14.5 11.1 - 15.9 g/dL    Hematocrit 43.4 34.0 - 46.6 %     (H) 79 - 97 fL    MCH 33.6 (H) 26.6 - 33.0 pg    MCHC 33.4 31.5 - 35.7 g/dL    RDW 11.4 (L) 11.7 - 15.4 %    Platelet Count 253 150 - 450 x10E3/uL    Nucleated RBC CANCELED    BASIC METABOLIC PANEL (8)    Collection Time: 10/25/23  4:22 AM   Result Value Ref Range    Glucose 107 (H) 70 - 99 mg/dL    Bun 13 8 - 27 mg/dL    Creatinine 0.67 0.57 - 1.00 mg/dL    eGFR 97 >59 mL/min/1.73    Bun-Creatinine Ratio 19 12 - 28    Sodium 138 134 - 144 mmol/L    Potassium 3.7 3.5 - 5.2 mmol/L    Chloride 99 96 - 106 mmol/L    Co2 26 20 - 29 mmol/L    Calcium 9.6 8.7 - 10.3 mg/dL   LIPID PANEL    Collection Time: 10/25/23  4:22 AM   Result Value Ref Range    Cholesterol,Tot 197 100 - 199 mg/dL    Triglycerides 80 0 - 149 mg/dL     >39 mg/dL    VLDL Cholesterol Calc 14 5 - 40 mg/dL    LDL Chol Calc (NIH) 69 0 - 99 mg/dL    Comment: CANCELED    TSH    Collection Time: 10/25/23  4:22 AM   Result Value Ref Range    TSH 3.320 0.450 - 4.500 uIU/mL   VITAMIN D 25-HYDROXY    Collection Time: 10/25/23  4:22 AM   Result Value Ref Range    25-Hydroxy   Vitamin D 25 50.0 30.0 - 100.0 ng/mL   ALANINE AMINO-TRANS    Collection Time: 10/25/23  4:22 AM   Result Value Ref Range    ALT(SGPT) 34 (H) 0 - 32 IU/L           Assessment & Plan     1. Family history of atherosclerosis  US-TOTAL VASCULAR SCREENING (S/P)    Lipoprotein (a)    CRP HIGH SENSITIVE (CARDIAC)    NMR LIPOPROFILE    APOLIPOPROTEIN B      2. Family history of stroke            Medical Decision Making: Today's Assessment/Status/Plan:        It was my pleasure to meet with Ms. Murillo.    Blood  pressure is well controlled.  She will continue to monitor and eat hearty healthy diet.    She does not meet criteria for statin therapy her risk is really quite low encouraged her to stop it and see if that helps with the concerns about memory issues    We can do more sophisticated risk assessment but cannot believe any of this testing would just be overall profile    I will see Ms. Murillo back in 2 year time and encouraged her to follow up with us over the phone or electronically using my MyChart as issues arise.    It is my pleasure to participate in the care of Ms. Murillo.  Please do not hesitate to contact me with questions or concerns.    Claude Eisenberg MD PhD [facility]  Cardiologist [facility]    Please note that this dictation was created using voice recognition software. There may be errors I did not discover before finalizing the note.

## 2023-11-15 LAB
ALBUMIN SERPL-MCNC: 4.8 G/DL (ref 3.6–5.1)
ALBUMIN/GLOB SERPL: 2.1 (CALC) (ref 1–2.5)
ALP SERPL-CCNC: 66 U/L (ref 36–130)
ALT SERPL-CCNC: 46 U/L (ref 9–46)
AST SERPL-CCNC: 24 U/L (ref 10–40)
BILIRUB SERPL-MCNC: 0.6 MG/DL (ref 0.2–1.2)
BUN SERPL-MCNC: 19 MG/DL (ref 7–25)
BUN/CREAT SERPL: NORMAL (CALC) (ref 6–22)
CALCIUM SERPL-MCNC: 9.6 MG/DL (ref 8.6–10.3)
CHLORIDE SERPL-SCNC: 102 MMOL/L (ref 98–110)
CHOLEST SERPL-MCNC: 210 MG/DL
CHOLEST/HDLC SERPL: 5.5 (CALC)
CO2 SERPL-SCNC: 27 MMOL/L (ref 20–32)
CREAT SERPL-MCNC: 1.11 MG/DL (ref 0.6–1.29)
EGFRCR SERPLBLD CKD-EPI 2021: 84 ML/MIN/1.73M2
GLOBULIN SER CALC-MCNC: 2.3 G/DL (CALC) (ref 1.9–3.7)
GLUCOSE SERPL-MCNC: 98 MG/DL (ref 65–99)
HDLC SERPL-MCNC: 38 MG/DL
LDLC SERPL CALC-MCNC: 129 MG/DL (CALC)
NONHDLC SERPL-MCNC: 172 MG/DL (CALC)
POTASSIUM SERPL-SCNC: 4.2 MMOL/L (ref 3.5–5.3)
PROT SERPL-MCNC: 7.1 G/DL (ref 6.1–8.1)
SODIUM SERPL-SCNC: 138 MMOL/L (ref 135–146)
TRIGL SERPL-MCNC: 279 MG/DL

## 2023-11-20 ENCOUNTER — OFFICE VISIT (OUTPATIENT)
Dept: FAMILY MEDICINE | Facility: CLINIC | Age: 43
End: 2023-11-20
Payer: COMMERCIAL

## 2023-11-20 VITALS
BODY MASS INDEX: 34.89 KG/M2 | TEMPERATURE: 97.7 F | HEART RATE: 80 BPM | HEIGHT: 72 IN | DIASTOLIC BLOOD PRESSURE: 72 MMHG | SYSTOLIC BLOOD PRESSURE: 118 MMHG | RESPIRATION RATE: 16 BRPM | OXYGEN SATURATION: 97 % | WEIGHT: 257.6 LBS

## 2023-11-20 DIAGNOSIS — Z82.49 FAMILY HISTORY OF EARLY CAD: ICD-10-CM

## 2023-11-20 DIAGNOSIS — E78.2 MIXED HYPERLIPIDEMIA: Primary | ICD-10-CM

## 2023-11-20 DIAGNOSIS — I10 ESSENTIAL HYPERTENSION: ICD-10-CM

## 2023-11-20 PROCEDURE — 3008F BODY MASS INDEX DOCD: CPT | Performed by: STUDENT IN AN ORGANIZED HEALTH CARE EDUCATION/TRAINING PROGRAM

## 2023-11-20 PROCEDURE — 90480 ADMN SARSCOV2 VAC 1/ONLY CMP: CPT | Performed by: STUDENT IN AN ORGANIZED HEALTH CARE EDUCATION/TRAINING PROGRAM

## 2023-11-20 PROCEDURE — 99214 OFFICE O/P EST MOD 30 MIN: CPT | Mod: 25 | Performed by: STUDENT IN AN ORGANIZED HEALTH CARE EDUCATION/TRAINING PROGRAM

## 2023-11-20 PROCEDURE — 3078F DIAST BP <80 MM HG: CPT | Performed by: STUDENT IN AN ORGANIZED HEALTH CARE EDUCATION/TRAINING PROGRAM

## 2023-11-20 PROCEDURE — 3074F SYST BP LT 130 MM HG: CPT | Performed by: STUDENT IN AN ORGANIZED HEALTH CARE EDUCATION/TRAINING PROGRAM

## 2023-11-20 PROCEDURE — 91322 SARSCOV2 VAC 50 MCG/0.5ML IM: CPT | Performed by: STUDENT IN AN ORGANIZED HEALTH CARE EDUCATION/TRAINING PROGRAM

## 2023-11-20 ASSESSMENT — PATIENT HEALTH QUESTIONNAIRE - PHQ9: SUM OF ALL RESPONSES TO PHQ9 QUESTIONS 1 & 2: 0

## 2023-11-20 NOTE — ASSESSMENT & PLAN NOTE
Continue Ozempic 2 mg.  Encourage lifestyle.  Recommend protein-based, high-fiber diet and regular exercise.  Follow-up in 6 months

## 2023-11-20 NOTE — PROGRESS NOTES
Marion Beltrán D.O.  [facility]  [address] [facility]  [address] [place] [address]  [phone]      HISTORY OF PRESENT ILLNESS        Chief Complaint   Patient presents with    Follow-up     Patient states here to review blood work        HPI:  Andrew Thrasher is a 43 y.o. male who presents to discuss the following.    # HLD  Total chol 210, , HDL 38,   Smaller portion sizes- less than half of what he used to eat  Veges with most meals  Cut back on breads  Bagel most days    # BMI 34  Works for a [employer] company, so physical at work, but does incorporate walking and physical activity outside of work  On Ozempic for weight loss/HLD/HTN      Current Outpatient Medications on File Prior to Visit   Medication Sig    albuterol HFA (VENTOLIN HFA) 90 mcg/actuation inhaler INHALE 2 PUFFS EVERY 6 HOURS AS NEEDED FOR WHEEZING    fluticasone propion-salmeteroL (ADVAIR DISKUS) 250-50 mcg/dose diskus inhaler Inhale 1 puff 2 (two) times a day.    montelukast (SINGULAIR) 10 mg tablet Take 1 tablet (10 mg total) by mouth See admin instr. At Night    olmesartan-hydrochlorothiazide (BENICAR HCT) 40-12.5 mg per tablet TAKE 1 TABLET BY MOUTH EVERY DAY    OZEMPIC 2 mg/dose (8 mg/3 mL) subcutaneous injection INJECT 2MG SUBCUTANEOUSLY (UNDER THE SKIN) EVERY 7 DAYS    sildenafiL (VIAGRA) 100 mg tablet TAKE 1 TABLET BY MOUTH EVERY DAY AS NEEDED    ibuprofen 200 mg tablet Take 200 mg by mouth every 6 (six) hours as needed for mild pain.     No current facility-administered medications on file prior to visit.        ROS        All systems reviewed and negative except as otherwise stated in HPI   PHYSICAL EXAMINATION      Visit Vitals  /72 (BP Location: Right upper arm, Patient Position: Sitting)   Pulse 80   Temp 36.5 °C (97.7 °F) (Temporal)   Resp 16   Ht 1.829 m (6')   Wt 117 kg (257 lb 9.6 oz)   SpO2 97%   BMI 34.94 kg/m²        Body mass index is 34.94 kg/m².     Wt Readings from  Last 3 Encounters:   11/20/23 117 kg (257 lb 9.6 oz)   10/05/23 111 kg (245 lb)   05/23/23 116 kg (255 lb)      BMI Readings from Last 3 Encounters:   11/20/23 34.94 kg/m²   10/05/23 33.23 kg/m²   05/23/23 34.58 kg/m²        Physical Exam  Vitals reviewed.   Constitutional:       General: He is not in acute distress.     Appearance: Normal appearance. He is not ill-appearing or toxic-appearing.   Cardiovascular:      Rate and Rhythm: Normal rate and regular rhythm.      Heart sounds: No murmur heard.  Pulmonary:      Effort: Pulmonary effort is normal. No respiratory distress.      Breath sounds: No stridor. No wheezing, rhonchi or rales.   Musculoskeletal:      Right lower leg: No edema.      Left lower leg: No edema.   Neurological:      Mental Status: He is alert.          ASSESSMENT AND PLAN   Diagnoses and all orders for this visit:    Mixed hyperlipidemia (Primary)  Assessment & Plan:  Total chol 210, , HDL 38, . Cont lifestyle management and weight loss. Checking calcium score    Orders:  -     CT HEART CORONARY ARTERY CALCIUM SCORE WITHOUT IV CONTRAST; Future    Family history of early CAD  Assessment & Plan:  He is agreeable to calcium score. Discussed risk factor modification      BMI 34.0-34.9,adult  Assessment & Plan:  Continue Ozempic 2 mg.  Encourage lifestyle.  Recommend protein-based, high-fiber diet and regular exercise.  Follow-up in 6 months      Essential hypertension  Assessment & Plan:  Well-controlled on olmesartan-HCTZ 40-12.5 mg.  Continue current regimen.  Follow up in 6 months      Other orders  -     Moderna Fall 2023 Covid-19         Current Outpatient Medications:     albuterol HFA (VENTOLIN HFA) 90 mcg/actuation inhaler, INHALE 2 PUFFS EVERY 6 HOURS AS NEEDED FOR WHEEZING, Disp: 6.7 each, Rfl: 3    fluticasone propion-salmeteroL (ADVAIR DISKUS) 250-50 mcg/dose diskus inhaler, Inhale 1 puff 2 (two) times a day., Disp: 180 each, Rfl: 1    montelukast (SINGULAIR) 10 mg  tablet, Take 1 tablet (10 mg total) by mouth See admin instr. At Night, Disp: 90 tablet, Rfl: 0    olmesartan-hydrochlorothiazide (BENICAR HCT) 40-12.5 mg per tablet, TAKE 1 TABLET BY MOUTH EVERY DAY, Disp: 90 tablet, Rfl: 1    OZEMPIC 2 mg/dose (8 mg/3 mL) subcutaneous injection, INJECT 2MG SUBCUTANEOUSLY (UNDER THE SKIN) EVERY 7 DAYS, Disp: 9 mL, Rfl: 0    sildenafiL (VIAGRA) 100 mg tablet, TAKE 1 TABLET BY MOUTH EVERY DAY AS NEEDED, Disp: 10 tablet, Rfl: 6    ibuprofen 200 mg tablet, Take 200 mg by mouth every 6 (six) hours as needed for mild pain., Disp: , Rfl:      Return in about 6 months (around 5/20/2024).     I spent 30 minutes on this date of service performing the following activities: obtaining history, performing examination, entering orders, documenting, preparing for visit, obtaining / reviewing records, providing counseling and education and independently reviewing study/studies.      Marion Beltrán DO  11/20/2023

## 2023-12-12 DIAGNOSIS — J45.30 MILD PERSISTENT ASTHMATIC BRONCHITIS WITHOUT COMPLICATION: ICD-10-CM

## 2023-12-12 RX ORDER — FLUTICASONE PROPIONATE AND SALMETEROL 250; 50 UG/1; UG/1
1 POWDER RESPIRATORY (INHALATION) 2 TIMES DAILY
Qty: 180 EACH | Refills: 1 | Status: SHIPPED | OUTPATIENT
Start: 2023-12-12 | End: 2024-06-28

## 2024-01-04 ENCOUNTER — TELEPHONE (OUTPATIENT)
Dept: FAMILY MEDICINE | Facility: CLINIC | Age: 44
End: 2024-01-04
Payer: COMMERCIAL

## 2024-01-04 DIAGNOSIS — K76.0 FATTY LIVER: ICD-10-CM

## 2024-01-04 RX ORDER — SEMAGLUTIDE 2.68 MG/ML
INJECTION, SOLUTION SUBCUTANEOUS
Qty: 9 ML | Refills: 0 | Status: SHIPPED | OUTPATIENT
Start: 2024-01-04 | End: 2024-01-05

## 2024-01-05 ENCOUNTER — TELEPHONE (OUTPATIENT)
Dept: FAMILY MEDICINE | Facility: CLINIC | Age: 44
End: 2024-01-05
Payer: COMMERCIAL

## 2024-01-05 NOTE — TELEPHONE ENCOUNTER
As long as he has not missed doses of ozempic, can switch to wegovy 1.7 mg weekly for 4 weeks then can increase to 2.4 mg as tolerated

## 2024-01-05 NOTE — TELEPHONE ENCOUNTER
Patient called stating that his Ozempic got denied. He needs a prior authorization. [employer] sent over a form. Please advise.    Patient can be reached at [phone].    Thank you!  Sofia

## 2024-01-05 NOTE — TELEPHONE ENCOUNTER
Patient called stating that Wegovy is covered by insurance without prior authorization.    Thank you!  Sofia

## 2024-02-05 RX ORDER — SEMAGLUTIDE 1.7 MG/.75ML
INJECTION, SOLUTION SUBCUTANEOUS
Qty: 3 ML | Refills: 0 | Status: SHIPPED | OUTPATIENT
Start: 2024-02-05 | End: 2024-03-04

## 2024-03-04 RX ORDER — SEMAGLUTIDE 1.7 MG/.75ML
INJECTION, SOLUTION SUBCUTANEOUS
Qty: 3 ML | Refills: 0 | Status: SHIPPED | OUTPATIENT
Start: 2024-03-04 | End: 2024-04-05

## 2024-04-01 DIAGNOSIS — I10 ESSENTIAL HYPERTENSION: ICD-10-CM

## 2024-04-01 RX ORDER — OLMESARTAN MEDOXOMIL AND HYDROCHLOROTHIAZIDE 40/12.5 40; 12.5 MG/1; MG/1
1 TABLET ORAL DAILY
Qty: 90 TABLET | Refills: 0 | Status: SHIPPED | OUTPATIENT
Start: 2024-04-01 | End: 2024-06-28

## 2024-04-05 RX ORDER — SEMAGLUTIDE 1.7 MG/.75ML
1.7 INJECTION, SOLUTION SUBCUTANEOUS
Qty: 3 ML | Refills: 0 | Status: SHIPPED | OUTPATIENT
Start: 2024-04-05 | End: 2024-06-05

## 2024-05-11 DIAGNOSIS — N52.8 OTHER MALE ERECTILE DYSFUNCTION: ICD-10-CM

## 2024-05-13 RX ORDER — SILDENAFIL 100 MG/1
TABLET, FILM COATED ORAL
Qty: 10 TABLET | Refills: 6 | Status: SHIPPED | OUTPATIENT
Start: 2024-05-13 | End: 2024-09-10 | Stop reason: SDUPTHER

## 2024-05-20 ENCOUNTER — OFFICE VISIT (OUTPATIENT)
Dept: FAMILY MEDICINE | Facility: CLINIC | Age: 44
End: 2024-05-20
Payer: COMMERCIAL

## 2024-05-20 VITALS
WEIGHT: 251 LBS | DIASTOLIC BLOOD PRESSURE: 60 MMHG | OXYGEN SATURATION: 97 % | TEMPERATURE: 97.9 F | SYSTOLIC BLOOD PRESSURE: 110 MMHG | HEART RATE: 85 BPM | BODY MASS INDEX: 34.04 KG/M2 | RESPIRATION RATE: 16 BRPM

## 2024-05-20 DIAGNOSIS — E78.2 MIXED HYPERLIPIDEMIA: ICD-10-CM

## 2024-05-20 DIAGNOSIS — I10 ESSENTIAL HYPERTENSION: Primary | ICD-10-CM

## 2024-05-20 DIAGNOSIS — J45.30 MILD PERSISTENT ASTHMATIC BRONCHITIS WITHOUT COMPLICATION: ICD-10-CM

## 2024-05-20 PROBLEM — E78.1 HYPERTRIGLYCERIDEMIA: Status: RESOLVED | Noted: 2020-11-19 | Resolved: 2024-05-20

## 2024-05-20 PROCEDURE — 99213 OFFICE O/P EST LOW 20 MIN: CPT | Performed by: STUDENT IN AN ORGANIZED HEALTH CARE EDUCATION/TRAINING PROGRAM

## 2024-05-20 PROCEDURE — 3078F DIAST BP <80 MM HG: CPT | Performed by: STUDENT IN AN ORGANIZED HEALTH CARE EDUCATION/TRAINING PROGRAM

## 2024-05-20 PROCEDURE — 3074F SYST BP LT 130 MM HG: CPT | Performed by: STUDENT IN AN ORGANIZED HEALTH CARE EDUCATION/TRAINING PROGRAM

## 2024-05-20 PROCEDURE — 3008F BODY MASS INDEX DOCD: CPT | Performed by: STUDENT IN AN ORGANIZED HEALTH CARE EDUCATION/TRAINING PROGRAM

## 2024-05-20 NOTE — ASSESSMENT & PLAN NOTE
Well-controlled on olmesartan-HCTZ 40-12.5 mg.  Continue current regimen.  Follow up in 6 months for annual physical

## 2024-05-20 NOTE — PROGRESS NOTES
Marion Beltrán D.O.  [facility]  [address] [facility]  [address]  [place] [address]  [phone]      HISTORY OF PRESENT ILLNESS        Chief Complaint   Patient presents with    Follow-up     Pt presents for 6 mo f/u. No concerns.         HPI:  Andrew Thrasher is a 44 y.o. male who presents for BP follow up.     He has no concerns today.  He is on olmesartan-HCTZ 40-12.5 mg.  He denies chest pain or shortness of breath.  He does not check his blood pressures at home.  He has continued to work on his diet.  Has been trying to walk more.    Current Outpatient Medications on File Prior to Visit   Medication Sig    albuterol HFA (VENTOLIN HFA) 90 mcg/actuation inhaler INHALE 2 PUFFS EVERY 6 HOURS AS NEEDED FOR WHEEZING    fluticasone propion-salmeteroL (ADVAIR DISKUS) 250-50 mcg/dose diskus inhaler Inhale 1 puff 2 (two) times a day.    montelukast (SINGULAIR) 10 mg tablet Take 1 tablet (10 mg total) by mouth See admin instr. At Night    olmesartan-hydrochlorothiazide (BENICAR HCT) 40-12.5 mg per tablet TAKE 1 TABLET BY MOUTH EVERY DAY    semaglutide, weight loss, (WEGOVY) 1.7 mg/0.75 mL subcutaneous injection Inject 1.7 mg under the skin every (seven) 7 days.    sildenafiL (VIAGRA) 100 mg tablet TAKE 1 TABLET BY MOUTH EVERY DAY AS NEEDED    ibuprofen 200 mg tablet Take 200 mg by mouth every 6 (six) hours as needed for mild pain.     No current facility-administered medications on file prior to visit.      ROS        All systems reviewed and negative except as otherwise stated in HPI   PHYSICAL EXAMINATION      Visit Vitals  /60 (BP Location: Right upper arm, Patient Position: Sitting)   Pulse 85   Temp 36.6 °C (97.9 °F) (Temporal)   Resp 16   Wt 114 kg (251 lb)   SpO2 97%   BMI 34.04 kg/m²      Body mass index is 34.04 kg/m².     Wt Readings from Last 3 Encounters:   05/20/24 114 kg (251 lb)   11/20/23 117 kg (257 lb 9.6 oz)   10/05/23 111 kg (245 lb)      BMI Readings from Last 3  Encounters:   05/20/24 34.04 kg/m²   11/20/23 34.94 kg/m²   10/05/23 33.23 kg/m²      Physical Exam  Vitals reviewed.   Constitutional:       General: He is not in acute distress.     Appearance: Normal appearance. He is not ill-appearing or toxic-appearing.   Cardiovascular:      Rate and Rhythm: Normal rate and regular rhythm.      Heart sounds: No murmur heard.  Pulmonary:      Effort: Pulmonary effort is normal. No respiratory distress.      Breath sounds: No stridor. No wheezing, rhonchi or rales.   Musculoskeletal:      Right lower leg: No edema.      Left lower leg: No edema.   Neurological:      Mental Status: He is alert.        ASSESSMENT AND PLAN   Diagnoses and all orders for this visit:    Essential hypertension (Primary)  Assessment & Plan:  Well-controlled on olmesartan-HCTZ 40-12.5 mg.  Continue current regimen.  Follow up in 6 months for annual physical    Orders:  -     Comprehensive metabolic panel; Future    Mixed hyperlipidemia  Assessment & Plan:  In setting of lower CVD risk, recommend continued lifestyle interventions.  Calcium score ordered previously, although not performed.  Will recheck labs in 6 months and reevaluate at that time.    Orders:  -     Lipid panel; Future    Mild persistent asthmatic bronchitis without complication  Assessment & Plan:  Stable.  Continue current regimen           Current Outpatient Medications:     albuterol HFA (VENTOLIN HFA) 90 mcg/actuation inhaler, INHALE 2 PUFFS EVERY 6 HOURS AS NEEDED FOR WHEEZING, Disp: 6.7 each, Rfl: 3    fluticasone propion-salmeteroL (ADVAIR DISKUS) 250-50 mcg/dose diskus inhaler, Inhale 1 puff 2 (two) times a day., Disp: 180 each, Rfl: 1    montelukast (SINGULAIR) 10 mg tablet, Take 1 tablet (10 mg total) by mouth See admin instr. At Night, Disp: 90 tablet, Rfl: 0    olmesartan-hydrochlorothiazide (BENICAR HCT) 40-12.5 mg per tablet, TAKE 1 TABLET BY MOUTH EVERY DAY, Disp: 90 tablet, Rfl: 0    semaglutide, weight loss, (WEGOVY) 1.7  mg/0.75 mL subcutaneous injection, Inject 1.7 mg under the skin every (seven) 7 days., Disp: 3 mL, Rfl: 0    sildenafiL (VIAGRA) 100 mg tablet, TAKE 1 TABLET BY MOUTH EVERY DAY AS NEEDED, Disp: 10 tablet, Rfl: 6    ibuprofen 200 mg tablet, Take 200 mg by mouth every 6 (six) hours as needed for mild pain., Disp: , Rfl:      Return in about 6 months (around 11/20/2024) for annual physical or sooner as needed.     I spent 20 minutes on this date of service performing the following activities: obtaining history, performing examination, entering orders, documenting, preparing for visit, obtaining / reviewing records, providing counseling and education, and independently reviewing study/studies.    Marion Beltrán,   5/20/2024

## 2024-05-20 NOTE — ASSESSMENT & PLAN NOTE
In setting of lower CVD risk, recommend continued lifestyle interventions.  Calcium score ordered previously, although not performed.  Will recheck labs in 6 months and reevaluate at that time.

## 2024-06-05 ENCOUNTER — TELEPHONE (OUTPATIENT)
Dept: FAMILY MEDICINE | Facility: CLINIC | Age: 44
End: 2024-06-05
Payer: COMMERCIAL

## 2024-06-05 DIAGNOSIS — J45.30 MILD PERSISTENT ASTHMATIC BRONCHITIS WITHOUT COMPLICATION: ICD-10-CM

## 2024-06-05 RX ORDER — SEMAGLUTIDE 1.7 MG/.75ML
INJECTION, SOLUTION SUBCUTANEOUS
Qty: 3 ML | Refills: 0 | Status: SHIPPED | OUTPATIENT
Start: 2024-06-05

## 2024-06-05 RX ORDER — ALBUTEROL SULFATE 90 UG/1
INHALANT RESPIRATORY (INHALATION)
Qty: 6.7 EACH | Refills: 0 | Status: SHIPPED | OUTPATIENT
Start: 2024-06-05 | End: 2024-10-28 | Stop reason: SDUPTHER

## 2024-06-05 NOTE — TELEPHONE ENCOUNTER
albuterol HFA (VENTOLIN HFA) 90 mcg/actuation inhaler       [id number] - [place], [place] -  [address] [phone]

## 2024-06-28 DIAGNOSIS — I10 ESSENTIAL HYPERTENSION: ICD-10-CM

## 2024-06-28 DIAGNOSIS — J45.30 MILD PERSISTENT ASTHMATIC BRONCHITIS WITHOUT COMPLICATION: ICD-10-CM

## 2024-06-28 RX ORDER — OLMESARTAN MEDOXOMIL AND HYDROCHLOROTHIAZIDE 40/12.5 40; 12.5 MG/1; MG/1
1 TABLET ORAL DAILY
Qty: 90 TABLET | Refills: 0 | Status: SHIPPED | OUTPATIENT
Start: 2024-06-28 | End: 2024-09-19

## 2024-06-28 RX ORDER — FLUTICASONE PROPIONATE AND SALMETEROL 250; 50 UG/1; UG/1
1 POWDER RESPIRATORY (INHALATION) 2 TIMES DAILY
Qty: 180 EACH | Refills: 1 | Status: SHIPPED | OUTPATIENT
Start: 2024-06-28 | End: 2024-12-04 | Stop reason: SDUPTHER

## 2024-09-10 ENCOUNTER — OFFICE VISIT (OUTPATIENT)
Age: 44
End: 2024-09-10

## 2024-09-10 VITALS
RESPIRATION RATE: 17 BRPM | WEIGHT: 236 LBS | HEIGHT: 74 IN | SYSTOLIC BLOOD PRESSURE: 122 MMHG | BODY MASS INDEX: 30.29 KG/M2 | HEART RATE: 66 BPM | DIASTOLIC BLOOD PRESSURE: 78 MMHG

## 2024-09-10 DIAGNOSIS — M21.42 ACQUIRED FLAT FOOT, LEFT: ICD-10-CM

## 2024-09-10 DIAGNOSIS — M21.961 ACQUIRED DEFORMITY OF BOTH FEET: Primary | ICD-10-CM

## 2024-09-10 DIAGNOSIS — M25.572 ARTHRALGIA OF LEFT FOOT: ICD-10-CM

## 2024-09-10 DIAGNOSIS — M21.41 ACQUIRED FLAT FOOT, RIGHT: ICD-10-CM

## 2024-09-10 DIAGNOSIS — M21.962 ACQUIRED DEFORMITY OF BOTH FEET: Primary | ICD-10-CM

## 2024-09-10 DIAGNOSIS — A69.23 LYME ARTHRITIS (HCC): ICD-10-CM

## 2024-09-10 DIAGNOSIS — M54.16 RADICULOPATHY OF LUMBAR REGION: ICD-10-CM

## 2024-09-10 DIAGNOSIS — N52.8 OTHER MALE ERECTILE DYSFUNCTION: ICD-10-CM

## 2024-09-10 PROCEDURE — 99204 OFFICE O/P NEW MOD 45 MIN: CPT | Performed by: PODIATRIST

## 2024-09-10 RX ORDER — OLMESARTAN MEDOXOMIL AND HYDROCHLOROTHIAZIDE 40/12.5 40; 12.5 MG/1; MG/1
1 TABLET ORAL DAILY
COMMUNITY
Start: 2024-06-29

## 2024-09-10 RX ORDER — MELOXICAM 15 MG/1
15 TABLET ORAL DAILY
Qty: 30 TABLET | Refills: 0 | Status: SHIPPED | OUTPATIENT
Start: 2024-09-10 | End: 2024-10-10

## 2024-09-10 RX ORDER — OLMESARTAN MEDOXOMIL, AMLODIPINE AND HYDROCHLOROTHIAZIDE TABLET 20/5/12.5 MG 20; 5; 12.5 MG/1; MG/1; MG/1
TABLET ORAL
COMMUNITY

## 2024-09-10 RX ORDER — SILDENAFIL 100 MG/1
TABLET, FILM COATED ORAL
Qty: 10 TABLET | Refills: 6 | Status: SHIPPED | OUTPATIENT
Start: 2024-09-10 | End: 2024-11-12 | Stop reason: SDUPTHER

## 2024-09-10 RX ORDER — PREGABALIN 75 MG/1
75 CAPSULE ORAL 2 TIMES DAILY
Qty: 60 CAPSULE | Refills: 0 | Status: SHIPPED | OUTPATIENT
Start: 2024-09-10 | End: 2024-10-10

## 2024-09-10 RX ORDER — ALBUTEROL SULFATE 90 UG/1
AEROSOL, METERED RESPIRATORY (INHALATION)
COMMUNITY
Start: 1994-09-10

## 2024-09-10 RX ORDER — FLUTICASONE PROPIONATE AND SALMETEROL 50; 250 UG/1; UG/1
POWDER RESPIRATORY (INHALATION)
COMMUNITY

## 2024-09-10 NOTE — PROGRESS NOTES
Assessment/Plan: Arthralgia bilateral foot.  Metatarsalgia secondary radiculopathy.  Acquired pes planus bilateral.  History of low back pain.  Rule out inflammatory arthropathy.  Rule out Lyme's disease.  History of contusion toes left foot.  Pain.    Plan.  Chart reviewed.  Patient examined.  At this time we need to rule out inflammatory arthropathy.  Blood work ordered.  We will treat radiculopathy with Lyrica.  This will be 75 mg twice daily.  Patient would benefit from pronation control.  His feet been casted for custom molded foot orthotics.  These will control deformity and ease pain.  In addition we will add Mobic.  X-rays left foot ordered.  Aftercare instruction given.  Return for follow-up.         Diagnoses and all orders for this visit:    Acquired deformity of both feet  -     Device Prior Authorization; Future    Acquired flat foot, right  -     Device Prior Authorization; Future    Acquired flat foot, left  -     Device Prior Authorization; Future    Arthralgia of left foot  -     XR foot 3+ vw left; Future  -     meloxicam (MOBIC) 15 mg tablet; Take 1 tablet (15 mg total) by mouth daily    Radiculopathy of lumbar region  -     meloxicam (MOBIC) 15 mg tablet; Take 1 tablet (15 mg total) by mouth daily  -     pregabalin (LYRICA) 75 mg capsule; Take 1 capsule (75 mg total) by mouth 2 (two) times a day    Lyme arthritis (HCC)  -     Lyme Total AB W Reflex to IGM/IGG; Future  -     Rheumatoid factor quant (Reflex Only- Do Not Order); Future  -     ZULEIMA Screen w/ Reflex to Titer/Pattern; Future    Other orders  -     Advair Diskus 250-50 MCG/ACT inhaler  -     ProAir  (90 Base) MCG/ACT inhaler  -     olmesartan-hydrochlorothiazide (BENICAR HCT) 40-12.5 MG per tablet; Take 1 tablet by mouth daily  -     Olmesartan-amLODIPine-HCTZ 20-5-12.5 MG TABS          Subjective: Patient has several complaints but his biggest complaint is pain in the ball of his feet.  He gets this with ambulation and prolonged  standing.  This is both feet.  Patient has history of low back pain that required spinal fusion.  He has chronic low back pain.  He feels like he has stones in his feet.  There is family history of rheumatoid arthritis.    No Known Allergies      Current Outpatient Medications:     meloxicam (MOBIC) 15 mg tablet, Take 1 tablet (15 mg total) by mouth daily, Disp: 30 tablet, Rfl: 0    olmesartan-hydrochlorothiazide (BENICAR HCT) 40-12.5 MG per tablet, Take 1 tablet by mouth daily, Disp: , Rfl:     pregabalin (LYRICA) 75 mg capsule, Take 1 capsule (75 mg total) by mouth 2 (two) times a day, Disp: 60 capsule, Rfl: 0    ProAir  (90 Base) MCG/ACT inhaler, , Disp: , Rfl:     Advair Diskus 250-50 MCG/ACT inhaler, , Disp: , Rfl:     Olmesartan-amLODIPine-HCTZ 20-5-12.5 MG TABS, , Disp: , Rfl:     There is no problem list on file for this patient.         Patient ID: Edmond Leon is a 44 y.o. male.    HPI    The following portions of the patient's history were reviewed and updated as appropriate:     family history is not on file.      has no history on file for tobacco use, alcohol use, and drug use.    Vitals:    09/10/24 1642   BP: 122/78   Pulse: 66   Resp: 17       Review of Systems      Objective:  Patient's shoes and socks removed.   Foot Exam    General  General Appearance: appears stated age and healthy   Orientation: alert and oriented to person, place, and time   Affect: appropriate       Right Foot/Ankle     Inspection and Palpation  Tenderness: great toe metatarsophalangeal joint and metatarsals   Swelling: dorsum   Arch: pes planus  Hammertoes: fifth toe  Hallux limitus: yes    Neurovascular  Dorsalis pedis: 3+  Posterior tibial: 3+  Superficial peroneal nerve sensation: diminished  Deep peroneal nerve sensation: diminished    Muscle Strength  Ankle dorsiflexion: 5      Left Foot/Ankle      Inspection and Palpation  Tenderness: great toe metatarsophalangeal joint and metatarsals   Swelling: dorsum    Arch: pes planus  Hammertoes: fifth toe  Hallux limitus: yes    Neurovascular  Dorsalis pedis: 3+  Posterior tibial: 3+  Superficial peroneal nerve sensation: diminished  Deep peroneal nerve sensation: diminished    Muscle Strength  Ankle dorsiflexion: 5      Physical Exam  Vitals and nursing note reviewed.   Constitutional:       Appearance: Normal appearance.   Cardiovascular:      Rate and Rhythm: Normal rate and regular rhythm.      Pulses:           Dorsalis pedis pulses are 3+ on the right side and 3+ on the left side.        Posterior tibial pulses are 3+ on the right side and 3+ on the left side.   Feet:      Comments: Patient is pronated in stance and gait.  He has functional hallux limitus.  He has early hallux rigidus deformity, stage/grade 1.  Negative Tinel bilateral.  Decreased vibratory sensation bilateral foot.  Skin:     Capillary Refill: Capillary refill takes less than 2 seconds.   Neurological:      Mental Status: He is alert.   Psychiatric:         Mood and Affect: Mood normal.         Behavior: Behavior normal.         Thought Content: Thought content normal.         Judgment: Judgment normal.

## 2024-09-16 ENCOUNTER — HOSPITAL ENCOUNTER (OUTPATIENT)
Dept: RADIOLOGY | Facility: HOSPITAL | Age: 44
Discharge: HOME | End: 2024-09-16
Attending: STUDENT IN AN ORGANIZED HEALTH CARE EDUCATION/TRAINING PROGRAM

## 2024-09-16 ENCOUNTER — TELEPHONE (OUTPATIENT)
Dept: FAMILY MEDICINE | Facility: CLINIC | Age: 44
End: 2024-09-16
Payer: COMMERCIAL

## 2024-09-16 DIAGNOSIS — E78.2 MIXED HYPERLIPIDEMIA: ICD-10-CM

## 2024-09-16 PROCEDURE — 75571 CT HRT W/O DYE W/CA TEST: CPT

## 2024-09-16 NOTE — TELEPHONE ENCOUNTER
Calcium score reviewed.  Please advise patient that he has an elevated calcium score of 442, which puts him in the  percentile when compared to men his age. This result indicates he does have plaque in his coronary arteries, which puts him at risk for a heart attack. I would recommend he starts atorvastatin 20 mg and would recommend a target LDL of 70. We can discuss further at this OV in 2 months. If he is open to starting now, would send atorvastatin 20 mg and repeat CMP and lipid panel in 3 months. Please relay result to patient.

## 2024-09-18 RX ORDER — ATORVASTATIN CALCIUM 20 MG/1
20 TABLET, FILM COATED ORAL DAILY
Qty: 90 TABLET | Refills: 0 | Status: SHIPPED | OUTPATIENT
Start: 2024-09-18 | End: 2024-11-26

## 2024-09-18 NOTE — TELEPHONE ENCOUNTER
Pt notified of results and  provider recommendation. Pt VU and agrees with plan. Rx sent to pharm and pt has labs orders which he will complete in 3 months.

## 2024-09-19 DIAGNOSIS — I10 ESSENTIAL HYPERTENSION: ICD-10-CM

## 2024-09-19 RX ORDER — OLMESARTAN MEDOXOMIL AND HYDROCHLOROTHIAZIDE 40/12.5 40; 12.5 MG/1; MG/1
1 TABLET ORAL DAILY
Qty: 90 TABLET | Refills: 1 | Status: SHIPPED | OUTPATIENT
Start: 2024-09-19 | End: 2024-11-12 | Stop reason: SDUPTHER

## 2024-09-27 DIAGNOSIS — M54.16 RADICULOPATHY OF LUMBAR REGION: ICD-10-CM

## 2024-09-27 DIAGNOSIS — M25.572 ARTHRALGIA OF LEFT FOOT: ICD-10-CM

## 2024-09-27 RX ORDER — MELOXICAM 15 MG/1
15 TABLET ORAL DAILY
Qty: 30 TABLET | Refills: 5 | Status: SHIPPED | OUTPATIENT
Start: 2024-09-27

## 2024-09-30 ENCOUNTER — TELEPHONE (OUTPATIENT)
Age: 44
End: 2024-09-30

## 2024-09-30 NOTE — TELEPHONE ENCOUNTER
Lmom for patient to call back we will need to r/s his appt Dr cooper will be in the OR on 10/11/2024

## 2024-10-04 ENCOUNTER — HOSPITAL ENCOUNTER (OUTPATIENT)
Dept: RADIOLOGY | Facility: HOSPITAL | Age: 44
Discharge: HOME | End: 2024-10-04
Payer: COMMERCIAL

## 2024-10-04 ENCOUNTER — APPOINTMENT (OUTPATIENT)
Dept: LAB | Facility: CLINIC | Age: 44
End: 2024-10-04
Payer: COMMERCIAL

## 2024-10-04 DIAGNOSIS — M25.572 ARTHRALGIA OF LEFT FOOT: ICD-10-CM

## 2024-10-04 DIAGNOSIS — A69.23 LYME ARTHRITIS (HCC): ICD-10-CM

## 2024-10-04 LAB
ANA SER QL IA: NEGATIVE
B BURGDOR IGG+IGM SER QL IA: NEGATIVE

## 2024-10-04 PROCEDURE — 86038 ANTINUCLEAR ANTIBODIES: CPT

## 2024-10-04 PROCEDURE — 36415 COLL VENOUS BLD VENIPUNCTURE: CPT

## 2024-10-04 PROCEDURE — 86430 RHEUMATOID FACTOR TEST QUAL: CPT

## 2024-10-04 PROCEDURE — 73630 X-RAY EXAM OF FOOT: CPT

## 2024-10-04 PROCEDURE — 86431 RHEUMATOID FACTOR QUANT: CPT

## 2024-10-04 PROCEDURE — 86618 LYME DISEASE ANTIBODY: CPT

## 2024-10-07 LAB — RHEUMATOID FACT SER QL LA: NEGATIVE

## 2024-10-17 DIAGNOSIS — M54.16 RADICULOPATHY OF LUMBAR REGION: ICD-10-CM

## 2024-10-18 RX ORDER — PREGABALIN 75 MG/1
75 CAPSULE ORAL 2 TIMES DAILY
Qty: 60 CAPSULE | Refills: 0 | Status: SHIPPED | OUTPATIENT
Start: 2024-10-18

## 2024-10-28 DIAGNOSIS — J45.30 MILD PERSISTENT ASTHMATIC BRONCHITIS WITHOUT COMPLICATION: ICD-10-CM

## 2024-10-28 RX ORDER — ALBUTEROL SULFATE 90 UG/1
INHALANT RESPIRATORY (INHALATION)
Qty: 6.7 EACH | Refills: 0 | Status: SHIPPED | OUTPATIENT
Start: 2024-10-28 | End: 2024-11-19

## 2024-10-28 RX ORDER — MONTELUKAST SODIUM 10 MG/1
10 TABLET ORAL SEE ADMIN INSTRUCTIONS
Qty: 90 TABLET | Refills: 0 | Status: SHIPPED | OUTPATIENT
Start: 2024-10-28 | End: 2024-11-27

## 2024-11-12 DIAGNOSIS — N52.8 OTHER MALE ERECTILE DYSFUNCTION: ICD-10-CM

## 2024-11-12 DIAGNOSIS — I10 ESSENTIAL HYPERTENSION: ICD-10-CM

## 2024-11-13 ENCOUNTER — OFFICE VISIT (OUTPATIENT)
Age: 44
End: 2024-11-13
Payer: COMMERCIAL

## 2024-11-13 VITALS
DIASTOLIC BLOOD PRESSURE: 81 MMHG | HEART RATE: 78 BPM | HEIGHT: 74 IN | SYSTOLIC BLOOD PRESSURE: 129 MMHG | BODY MASS INDEX: 30.29 KG/M2 | WEIGHT: 236 LBS | RESPIRATION RATE: 17 BRPM

## 2024-11-13 DIAGNOSIS — M25.572 ARTHRALGIA OF LEFT FOOT: ICD-10-CM

## 2024-11-13 DIAGNOSIS — M21.961 ACQUIRED DEFORMITY OF BOTH FEET: Primary | ICD-10-CM

## 2024-11-13 DIAGNOSIS — M21.962 ACQUIRED DEFORMITY OF BOTH FEET: Primary | ICD-10-CM

## 2024-11-13 DIAGNOSIS — M54.16 RADICULOPATHY OF LUMBAR REGION: ICD-10-CM

## 2024-11-13 DIAGNOSIS — M21.42 ACQUIRED FLAT FOOT, LEFT: ICD-10-CM

## 2024-11-13 DIAGNOSIS — M21.41 ACQUIRED FLAT FOOT, RIGHT: ICD-10-CM

## 2024-11-13 PROCEDURE — 99213 OFFICE O/P EST LOW 20 MIN: CPT | Performed by: PODIATRIST

## 2024-11-13 RX ORDER — SILDENAFIL 100 MG/1
TABLET, FILM COATED ORAL
Qty: 10 TABLET | Refills: 3 | Status: SHIPPED | OUTPATIENT
Start: 2024-11-13 | End: 2025-03-06

## 2024-11-13 RX ORDER — OLMESARTAN MEDOXOMIL AND HYDROCHLOROTHIAZIDE 40/12.5 40; 12.5 MG/1; MG/1
1 TABLET ORAL DAILY
Qty: 90 TABLET | Refills: 0 | Status: SHIPPED | OUTPATIENT
Start: 2024-11-13 | End: 2025-02-03

## 2024-11-13 RX ORDER — PREGABALIN 100 MG/1
100 CAPSULE ORAL 2 TIMES DAILY
Qty: 60 CAPSULE | Refills: 2 | Status: SHIPPED | OUTPATIENT
Start: 2024-11-13 | End: 2025-02-11

## 2024-11-13 NOTE — PROGRESS NOTES
Assessment/Plan:  Pain upon ambulation.  Arthralgia left first MPJ.  Acquired pes planus.  Radiculopathy.    Plan.  Chart reviewed.  PCP notes reviewed.  X-rays reviewed with patient.  At this time because patient is doing well we will increase Lyrica dosing to 100 mg twice daily.  He will use orthotics daily.  Advil if patient has intermittent pain of left big toe joint.  Arthrocentesis as needed.  Aftercare instruction given.  Return as needed.       Diagnoses and all orders for this visit:    Acquired deformity of both feet    Acquired flat foot, left    Acquired flat foot, right    Arthralgia of left foot    Radiculopathy of lumbar region  -     pregabalin (LYRICA) 100 mg capsule; Take 1 capsule (100 mg total) by mouth 2 (two) times a day          Subjective: Patient is doing significant better.  He has no pain in the big toe joint of the left foot.  His other symptoms are approximately 80% resolved.    No Known Allergies      Current Outpatient Medications:     pregabalin (LYRICA) 100 mg capsule, Take 1 capsule (100 mg total) by mouth 2 (two) times a day, Disp: 60 capsule, Rfl: 2    Advair Diskus 250-50 MCG/ACT inhaler, , Disp: , Rfl:     meloxicam (MOBIC) 15 mg tablet, TAKE 1 TABLET BY MOUTH EVERY DAY, Disp: 30 tablet, Rfl: 5    Olmesartan-amLODIPine-HCTZ 20-5-12.5 MG TABS, , Disp: , Rfl:     olmesartan-hydrochlorothiazide (BENICAR HCT) 40-12.5 MG per tablet, Take 1 tablet by mouth daily, Disp: , Rfl:     pregabalin (LYRICA) 75 mg capsule, TAKE 1 CAPSULE BY MOUTH TWO TIMES DAILY, Disp: 60 capsule, Rfl: 0    ProAir  (90 Base) MCG/ACT inhaler, , Disp: , Rfl:     There is no problem list on file for this patient.         Patient ID: Edmond Leon is a 44 y.o. male.    HPI    The following portions of the patient's history were reviewed and updated as appropriate:     family history is not on file.      has no history on file for tobacco use, alcohol use, and drug use.    Vitals:    11/13/24 1635   BP:  129/81   Pulse: 78   Resp: 17       Review of Systems      Objective:  Patient's shoes and socks removed.   Foot ExamPhysical Exam      General  General Appearance: appears stated age and healthy   Orientation: alert and oriented to person, place, and time   Affect: appropriate         Right Foot/Ankle      Inspection and Palpation  Tenderness: great toe metatarsophalangeal joint and metatarsals   Swelling: dorsum   Arch: pes planus  Hammertoes: fifth toe  Hallux limitus: yes     Neurovascular  Dorsalis pedis: 3+  Posterior tibial: 3+  Superficial peroneal nerve sensation: diminished  Deep peroneal nerve sensation: diminished     Muscle Strength  Ankle dorsiflexion: 5        Left Foot/Ankle       Inspection and Palpation  Tenderness: great toe metatarsophalangeal joint and metatarsals   Swelling: dorsum   Arch: pes planus  Hammertoes: fifth toe  Hallux limitus: yes     Neurovascular  Dorsalis pedis: 3+  Posterior tibial: 3+  Superficial peroneal nerve sensation: diminished  Deep peroneal nerve sensation: diminished     Muscle Strength  Ankle dorsiflexion: 5        Physical Exam  Vitals and nursing note reviewed.   Constitutional:       Appearance: Normal appearance.   Cardiovascular:      Rate and Rhythm: Normal rate and regular rhythm.      Pulses:           Dorsalis pedis pulses are 3+ on the right side and 3+ on the left side.        Posterior tibial pulses are 3+ on the right side and 3+ on the left side.   Feet:      Comments: Patient is pronated in stance and gait.  He has functional hallux limitus.  He has early hallux rigidus deformity, stage/grade 1.  Negative Tinel bilateral.  Decreased vibratory sensation bilateral foot.  Skin:     Capillary Refill: Capillary refill takes less than 2 seconds.   Neurological:      Mental Status: He is alert.   Psychiatric:         Mood and Affect: Mood normal.         Behavior: Behavior normal.         Thought Content: Thought content normal.         Judgment: Judgment  normal.

## 2024-11-19 DIAGNOSIS — J45.30 MILD PERSISTENT ASTHMATIC BRONCHITIS WITHOUT COMPLICATION: ICD-10-CM

## 2024-11-19 RX ORDER — ALBUTEROL SULFATE 90 UG/1
INHALANT RESPIRATORY (INHALATION)
Qty: 6.7 G | Refills: 1 | Status: SHIPPED | OUTPATIENT
Start: 2024-11-19 | End: 2025-03-29 | Stop reason: SDUPTHER

## 2024-11-21 ENCOUNTER — OFFICE VISIT (OUTPATIENT)
Dept: FAMILY MEDICINE | Facility: CLINIC | Age: 44
End: 2024-11-21
Payer: COMMERCIAL

## 2024-11-21 VITALS
DIASTOLIC BLOOD PRESSURE: 76 MMHG | SYSTOLIC BLOOD PRESSURE: 132 MMHG | OXYGEN SATURATION: 98 % | HEART RATE: 83 BPM | HEIGHT: 72 IN | BODY MASS INDEX: 34.27 KG/M2 | WEIGHT: 253 LBS | TEMPERATURE: 98.3 F | RESPIRATION RATE: 16 BRPM

## 2024-11-21 DIAGNOSIS — I10 ESSENTIAL HYPERTENSION: ICD-10-CM

## 2024-11-21 DIAGNOSIS — Z11.4 ENCOUNTER FOR SCREENING FOR HIV: ICD-10-CM

## 2024-11-21 DIAGNOSIS — E78.2 MIXED HYPERLIPIDEMIA: ICD-10-CM

## 2024-11-21 DIAGNOSIS — I25.10 CORONARY ARTERY DISEASE INVOLVING NATIVE HEART WITHOUT ANGINA PECTORIS, UNSPECIFIED VESSEL OR LESION TYPE: ICD-10-CM

## 2024-11-21 DIAGNOSIS — Z00.00 ENCOUNTER FOR GENERAL ADULT MEDICAL EXAMINATION WITHOUT ABNORMAL FINDINGS: Primary | ICD-10-CM

## 2024-11-21 DIAGNOSIS — Z01.84 IMMUNITY STATUS TESTING: ICD-10-CM

## 2024-11-21 PROBLEM — Z01.810 PREOP CARDIOVASCULAR EXAM: Status: RESOLVED | Noted: 2018-11-19 | Resolved: 2024-11-21

## 2024-11-21 PROCEDURE — 99396 PREV VISIT EST AGE 40-64: CPT | Mod: 25 | Performed by: STUDENT IN AN ORGANIZED HEALTH CARE EDUCATION/TRAINING PROGRAM

## 2024-11-21 PROCEDURE — 3078F DIAST BP <80 MM HG: CPT | Performed by: STUDENT IN AN ORGANIZED HEALTH CARE EDUCATION/TRAINING PROGRAM

## 2024-11-21 PROCEDURE — 3008F BODY MASS INDEX DOCD: CPT | Performed by: STUDENT IN AN ORGANIZED HEALTH CARE EDUCATION/TRAINING PROGRAM

## 2024-11-21 PROCEDURE — 90656 IIV3 VACC NO PRSV 0.5 ML IM: CPT | Performed by: STUDENT IN AN ORGANIZED HEALTH CARE EDUCATION/TRAINING PROGRAM

## 2024-11-21 PROCEDURE — 90471 IMMUNIZATION ADMIN: CPT | Performed by: STUDENT IN AN ORGANIZED HEALTH CARE EDUCATION/TRAINING PROGRAM

## 2024-11-21 PROCEDURE — 3075F SYST BP GE 130 - 139MM HG: CPT | Performed by: STUDENT IN AN ORGANIZED HEALTH CARE EDUCATION/TRAINING PROGRAM

## 2024-11-21 RX ORDER — OLMESARTAN MEDOXOMIL, AMLODIPINE AND HYDROCHLOROTHIAZIDE TABLET 20/5/12.5 MG 20; 5; 12.5 MG/1; MG/1; MG/1
TABLET ORAL
COMMUNITY
End: 2024-11-21

## 2024-11-21 RX ORDER — MELOXICAM 15 MG/1
15 TABLET ORAL DAILY
COMMUNITY
Start: 2024-11-13

## 2024-11-21 RX ORDER — PREGABALIN 75 MG/1
75 CAPSULE ORAL 2 TIMES DAILY
COMMUNITY
Start: 2024-10-18

## 2024-11-21 SDOH — ECONOMIC STABILITY: TRANSPORTATION INSECURITY
IN THE PAST 12 MONTHS, HAS THE LACK OF TRANSPORTATION KEPT YOU FROM MEDICAL APPOINTMENTS OR FROM GETTING MEDICATIONS?: NO

## 2024-11-21 SDOH — ECONOMIC STABILITY: FOOD INSECURITY: WITHIN THE PAST 12 MONTHS, YOU WORRIED THAT YOUR FOOD WOULD RUN OUT BEFORE YOU GOT MONEY TO BUY MORE.: NEVER TRUE

## 2024-11-21 SDOH — ECONOMIC STABILITY: FOOD INSECURITY: WITHIN THE PAST 12 MONTHS, THE FOOD YOU BOUGHT JUST DIDN'T LAST AND YOU DIDN'T HAVE MONEY TO GET MORE.: NEVER TRUE

## 2024-11-21 SDOH — ECONOMIC STABILITY: INCOME INSECURITY: IN THE LAST 12 MONTHS, WAS THERE A TIME WHEN YOU WERE NOT ABLE TO PAY THE MORTGAGE OR RENT ON TIME?: NO

## 2024-11-21 SDOH — ECONOMIC STABILITY: TRANSPORTATION INSECURITY
IN THE PAST 12 MONTHS, HAS LACK OF TRANSPORTATION KEPT YOU FROM MEETINGS, WORK, OR FROM GETTING THINGS NEEDED FOR DAILY LIVING?: NO

## 2024-11-21 ASSESSMENT — PATIENT HEALTH QUESTIONNAIRE - PHQ9: SUM OF ALL RESPONSES TO PHQ9 QUESTIONS 1 & 2: 0

## 2024-11-21 ASSESSMENT — SOCIAL DETERMINANTS OF HEALTH (SDOH): IN THE PAST 12 MONTHS, HAS THE ELECTRIC, GAS, OIL, OR WATER COMPANY THREATENED TO SHUT OFF SERVICE IN YOUR HOME?: NO

## 2024-11-21 NOTE — ASSESSMENT & PLAN NOTE
CT calcium ordered due to family history. Calcium score in 9/2024 found to be 442. Started atorvastatin 20 mg. Recheck labs in 12/2024.

## 2024-11-21 NOTE — ASSESSMENT & PLAN NOTE
BP is reasonably controlled on olmesartan-HCTZ 40-12.5 mg.  Continue current regimen and f/u in 6 months. Would consider adding back amlodipine if BP's trend up.

## 2024-11-21 NOTE — ASSESSMENT & PLAN NOTE
Andrew Thrasher is a 44 y.o. male who presents today for annual physical. Age appropriate screening and routine maintenance reviewed and updated today.

## 2024-11-21 NOTE — PROGRESS NOTES
"ESTABLISHED PATIENT PHYSICAL EXAM    Marion Beltrán D.O.  [facility] [place]  [address] [facility]  [address] [place] [address]  [phone]          Chief Complaint   Patient presents with    Annual Exam      HPI:  Andrew Thrasher is a 44 y.o. male here for annual physical.    He has no concerns.    Discussed today the results of CT calcium score on 9/16/24.   \"1. Composite Agatston coronary artery calcium score of 442, which is between the  90 and 100 percentile for males between the ages of 40 and 44, as per Colin 2001  Database. This correlates with \"extensive atherosclerotic plaque\" with \"high  likelihood of at least one significant coronary narrowing\".  Was started on atorvastatin 20 mg at that time  He denies cp/sob    # PMHx  HTN- on olmesartan-hctz. At one point, was on olmesartan-hctz-amlodipine.   HLD/CAD- started atorvastatin 9/2024. Due for f/u labs in 1 month  Asthma- well controlled with rare use of albuterol    Immunizations: agreeable to flu, declines covid  Colonoscopy: will be due in April 2025    Diet: has cut out fast food  Occupation:   Has a girlfriend and 2 sons    PAST MEDICAL AND SURGICAL HISTORY        Past Medical History:   Diagnosis Date    Arthritis     Asthma     Heartburn     Hypertension     Lumbar pain with radiation down both legs     Lumbar radiculopathy     Lumbar stenosis     Numbness and tingling of both feet      Past Surgical History   Procedure Laterality Date    Back surgery  2018    Back surgery  01/19/2022    Elbow surgery Right     Eye surgery Right     cataract    Knee arthroscopy Right     L5-S1 Posterior Lumbar Decompression Posterior Interbody Fusion Instrumentation Cage Allograft/Autograft L4-5 Decompression N/A 11/21/2018    Performed by John Cavanaugh MD at  OR    Shoulder arthroscopy Bilateral     [place] tooth extraction       MEDICATIONS        Current Outpatient Medications on File Prior to Visit   Medication Sig    " albuterol HFA 90 mcg/actuation inhaler INHALE 2 PUFFS BY MOUTH EVERY 6 HOURS AS NEEDED FOR WHEEZING    atorvastatin (LIPITOR) 20 mg tablet Take 1 tablet (20 mg total) by mouth daily.    fluticasone propion-salmeteroL (ADVAIR DISKUS) 250-50 mcg/dose diskus inhaler INHALE 1 PUFF TWICE A DAY    meloxicam (MOBIC) 15 mg tablet Take 15 mg by mouth daily.    montelukast (SINGULAIR) 10 mg tablet Take 1 tablet (10 mg total) by mouth See admin instr. At Night    olmesartan-hydrochlorothiazide (BENICAR HCT) 40-12.5 mg per tablet Take 1 tablet by mouth daily.    pregabalin (LYRICA) 75 mg capsule Take 75 mg by mouth 2 times daily.    sildenafiL (VIAGRA) 100 mg tablet TAKE 1 TABLET BY MOUTH EVERY DAY AS NEEDED    WEGOVY 1.7 mg/0.75 mL subcutaneous injection INJECT 1.7MG SUBCUTANEOUSLY (UNDER THE SKIN) EVERY 7 DAYS (Patient not taking: Reported on 11/21/2024)     No current facility-administered medications on file prior to visit.      ALLERGIES        No known allergies  FAMILY HISTORY        Family History   Problem Relation Name Age of Onset    Hyperlipidemia Biological Mother      Hypertension Biological Mother      Stroke Biological Mother      Heart attack Biological Mother      Hypertension Biological Father      Arthritis Biological Father      Asthma Biological Brother      Hypertension Paternal Grandmother      Other Paternal Grandmother          complications from hip fracture    Throat cancer Maternal Grandmother      Parkinsonism Maternal Grandfather       SOCIAL/ TOBACCO HISTORY        Social History     Tobacco Use    Smoking status: Never    Smokeless tobacco: Never   Vaping Use    Vaping status: Never Used   Substance Use Topics    Alcohol use: Yes     Comment: rarely    Drug use: No     REVIEW OF SYSTEMS        All systems reviewed and negative except as otherwise stated in HPI   PHYSICAL EXAMINATION      Visit Vitals  /76   Pulse 83   Temp 36.8 °C (98.3 °F) (Temporal)   Resp 16   Ht 1.829 m (6')   Wt 115  kg (253 lb)   SpO2 98%   BMI 34.31 kg/m²      Body mass index is 34.31 kg/m².     Wt Readings from Last 3 Encounters:   11/21/24 115 kg (253 lb)   05/20/24 114 kg (251 lb)   11/20/23 117 kg (257 lb 9.6 oz)      BMI Readings from Last 3 Encounters:   11/21/24 34.31 kg/m²   05/20/24 34.04 kg/m²   11/20/23 34.94 kg/m²      Physical Exam  Vitals reviewed.   Constitutional:       General: He is not in acute distress.     Appearance: Normal appearance. He is not ill-appearing or toxic-appearing.   HENT:      Right Ear: Tympanic membrane and ear canal normal. There is no impacted cerumen.      Left Ear: Tympanic membrane and ear canal normal. There is no impacted cerumen.      Mouth/Throat:      Mouth: Mucous membranes are moist.      Pharynx: Oropharynx is clear. No oropharyngeal exudate or posterior oropharyngeal erythema.   Cardiovascular:      Rate and Rhythm: Normal rate and regular rhythm.      Pulses: Normal pulses.      Heart sounds: No murmur heard.  Pulmonary:      Effort: Pulmonary effort is normal. No respiratory distress.      Breath sounds: No wheezing, rhonchi or rales.   Abdominal:      General: There is no distension.      Palpations: Abdomen is soft. There is no mass.      Tenderness: There is no abdominal tenderness. There is no guarding or rebound.      Hernia: No hernia is present.   Musculoskeletal:      Right lower leg: No edema.      Left lower leg: No edema.   Neurological:      Mental Status: He is alert.   Psychiatric:         Mood and Affect: Mood normal.         Behavior: Behavior normal.        ASSESSMENT AND PLAN   Diagnoses and all orders for this visit:    Encounter for general adult medical examination without abnormal findings (Primary)  Assessment & Plan:  Andrew Thrasher is a 44 y.o. male who presents today for annual physical. Age appropriate screening and routine maintenance reviewed and updated today.       Essential hypertension  Assessment & Plan:  BP is reasonably controlled on  olmesartan-HCTZ 40-12.5 mg.  Continue current regimen and f/u in 6 months. Would consider adding back amlodipine if BP's trend up.    Orders:  -     Comprehensive metabolic panel; Future    Mixed hyperlipidemia  Assessment & Plan:  CT calcium ordered due to family history. Calcium score in 9/2024 found to be 442. Started atorvastatin 20 mg. Recheck labs in 12/2024.     Orders:  -     Lipid panel; Future    Coronary artery disease involving native heart without angina pectoris, unspecified vessel or lesion type  Assessment & Plan:  CT calcium ordered due to family history. Calcium score in 9/2024 found to be 442. Started atorvastatin 20 mg. Recheck labs in 12/2024. Referred to cardiology for further evaluation.    Orders:  -     Ambulatory referral to Cardiology; Future  -     Direct Access Colonoscopy [facility]    Encounter for screening for HIV  -     HIV 1,2 AB P24 AG; Future    Immunity status testing  -     Hepatitis B surface antigen; Future  -     Hepatitis B surface antibody; Future  -     Hepatitis B core antibody, total; Future    Other orders  -     Influenza vaccine trivalent preservative free 6 mons and older IM       Current Outpatient Medications:     albuterol HFA 90 mcg/actuation inhaler, INHALE 2 PUFFS BY MOUTH EVERY 6 HOURS AS NEEDED FOR WHEEZING, Disp: 6.7 g, Rfl: 1    atorvastatin (LIPITOR) 20 mg tablet, Take 1 tablet (20 mg total) by mouth daily., Disp: 90 tablet, Rfl: 0    fluticasone propion-salmeteroL (ADVAIR DISKUS) 250-50 mcg/dose diskus inhaler, INHALE 1 PUFF TWICE A DAY, Disp: 180 each, Rfl: 1    meloxicam (MOBIC) 15 mg tablet, Take 15 mg by mouth daily., Disp: , Rfl:     montelukast (SINGULAIR) 10 mg tablet, Take 1 tablet (10 mg total) by mouth See admin instr. At Night, Disp: 90 tablet, Rfl: 0    olmesartan-hydrochlorothiazide (BENICAR HCT) 40-12.5 mg per tablet, Take 1 tablet by mouth daily., Disp: 90 tablet, Rfl: 0    pregabalin (LYRICA) 75 mg capsule, Take 75 mg by mouth 2  times daily., Disp: , Rfl:     sildenafiL (VIAGRA) 100 mg tablet, TAKE 1 TABLET BY MOUTH EVERY DAY AS NEEDED, Disp: 10 tablet, Rfl: 3    WEGOVY 1.7 mg/0.75 mL subcutaneous injection, INJECT 1.7MG SUBCUTANEOUSLY (UNDER THE SKIN) EVERY 7 DAYS (Patient not taking: Reported on 11/21/2024), Disp: 3 mL, Rfl: 0    Return in about 6 months (around 5/21/2025) for BP follow up.     Marion Beltrán,   11/21/2024

## 2024-11-21 NOTE — ASSESSMENT & PLAN NOTE
CT calcium ordered due to family history. Calcium score in 9/2024 found to be 442. Started atorvastatin 20 mg. Recheck labs in 12/2024. Referred to cardiology for further evaluation.

## 2024-11-26 RX ORDER — ATORVASTATIN CALCIUM 20 MG/1
20 TABLET, FILM COATED ORAL DAILY
Qty: 90 TABLET | Refills: 1 | Status: SHIPPED | OUTPATIENT
Start: 2024-11-26 | End: 2025-01-07 | Stop reason: SDUPTHER

## 2024-12-04 DIAGNOSIS — J45.30 MILD PERSISTENT ASTHMATIC BRONCHITIS WITHOUT COMPLICATION: ICD-10-CM

## 2024-12-04 RX ORDER — FLUTICASONE PROPIONATE AND SALMETEROL 250; 50 UG/1; UG/1
1 POWDER RESPIRATORY (INHALATION) 2 TIMES DAILY
Qty: 180 EACH | Refills: 1 | Status: SHIPPED | OUTPATIENT
Start: 2024-12-04 | End: 2025-01-02

## 2025-01-02 DIAGNOSIS — J45.30 MILD PERSISTENT ASTHMATIC BRONCHITIS WITHOUT COMPLICATION: ICD-10-CM

## 2025-01-02 RX ORDER — FLUTICASONE PROPIONATE AND SALMETEROL 250; 50 UG/1; UG/1
1 POWDER RESPIRATORY (INHALATION) 2 TIMES DAILY
Qty: 180 EACH | Refills: 1 | Status: SHIPPED | OUTPATIENT
Start: 2025-01-02 | End: 2025-01-07 | Stop reason: SDUPTHER

## 2025-01-07 DIAGNOSIS — J45.30 MILD PERSISTENT ASTHMATIC BRONCHITIS WITHOUT COMPLICATION: ICD-10-CM

## 2025-01-07 RX ORDER — FLUTICASONE PROPIONATE AND SALMETEROL 250; 50 UG/1; UG/1
1 POWDER RESPIRATORY (INHALATION) 2 TIMES DAILY
Qty: 180 EACH | Refills: 1 | Status: SHIPPED | OUTPATIENT
Start: 2025-01-07 | End: 2025-02-26 | Stop reason: SDUPTHER

## 2025-01-07 RX ORDER — ATORVASTATIN CALCIUM 20 MG/1
20 TABLET, FILM COATED ORAL DAILY
Qty: 90 TABLET | Refills: 1 | Status: SHIPPED | OUTPATIENT
Start: 2025-01-07

## 2025-02-01 DIAGNOSIS — I10 ESSENTIAL HYPERTENSION: ICD-10-CM

## 2025-02-03 RX ORDER — OLMESARTAN MEDOXOMIL AND HYDROCHLOROTHIAZIDE 40/12.5 40; 12.5 MG/1; MG/1
1 TABLET ORAL DAILY
Qty: 90 TABLET | Refills: 1 | Status: SHIPPED | OUTPATIENT
Start: 2025-02-03 | End: 2025-02-26 | Stop reason: SDUPTHER

## 2025-02-26 DIAGNOSIS — J45.30 MILD PERSISTENT ASTHMATIC BRONCHITIS WITHOUT COMPLICATION: ICD-10-CM

## 2025-02-26 DIAGNOSIS — I10 ESSENTIAL HYPERTENSION: ICD-10-CM

## 2025-02-26 RX ORDER — OLMESARTAN MEDOXOMIL AND HYDROCHLOROTHIAZIDE 40/12.5 40; 12.5 MG/1; MG/1
1 TABLET ORAL DAILY
Qty: 90 TABLET | Refills: 1 | Status: SHIPPED | OUTPATIENT
Start: 2025-02-26

## 2025-02-26 RX ORDER — FLUTICASONE PROPIONATE AND SALMETEROL 250; 50 UG/1; UG/1
1 POWDER RESPIRATORY (INHALATION) 2 TIMES DAILY
Qty: 180 EACH | Refills: 1 | Status: SHIPPED | OUTPATIENT
Start: 2025-02-26

## 2025-03-06 DIAGNOSIS — N52.8 OTHER MALE ERECTILE DYSFUNCTION: ICD-10-CM

## 2025-03-06 RX ORDER — SILDENAFIL 100 MG/1
TABLET, FILM COATED ORAL
Qty: 10 TABLET | Refills: 3 | Status: SHIPPED | OUTPATIENT
Start: 2025-03-06 | End: 2025-03-29 | Stop reason: SDUPTHER

## 2025-03-29 DIAGNOSIS — J45.30 MILD PERSISTENT ASTHMATIC BRONCHITIS WITHOUT COMPLICATION: ICD-10-CM

## 2025-03-29 DIAGNOSIS — N52.8 OTHER MALE ERECTILE DYSFUNCTION: ICD-10-CM

## 2025-03-31 RX ORDER — SILDENAFIL 100 MG/1
TABLET, FILM COATED ORAL
Qty: 10 TABLET | Refills: 3 | Status: SHIPPED | OUTPATIENT
Start: 2025-03-31

## 2025-03-31 RX ORDER — ALBUTEROL SULFATE 90 UG/1
INHALANT RESPIRATORY (INHALATION)
Qty: 6.7 G | Refills: 3 | Status: SHIPPED | OUTPATIENT
Start: 2025-03-31

## 2025-04-23 DIAGNOSIS — J45.30 MILD PERSISTENT ASTHMATIC BRONCHITIS WITHOUT COMPLICATION: ICD-10-CM

## 2025-04-23 RX ORDER — MONTELUKAST SODIUM 10 MG/1
10 TABLET ORAL NIGHTLY
Qty: 90 TABLET | Refills: 1 | Status: SHIPPED | OUTPATIENT
Start: 2025-04-23

## 2025-07-02 ENCOUNTER — TELEPHONE (OUTPATIENT)
Dept: SCHEDULING | Facility: CLINIC | Age: 45
End: 2025-07-02
Payer: COMMERCIAL

## 2025-07-02 ENCOUNTER — TELEPHONE (OUTPATIENT)
Dept: FAMILY MEDICINE | Facility: CLINIC | Age: 45
End: 2025-07-02
Payer: COMMERCIAL

## 2025-07-02 DIAGNOSIS — J45.30 MILD PERSISTENT ASTHMATIC BRONCHITIS WITHOUT COMPLICATION: ICD-10-CM

## 2025-07-02 LAB
ALBUMIN SERPL-MCNC: 4.8 G/DL (ref 3.6–5.1)
ALBUMIN/GLOB SERPL: 2.2 (CALC) (ref 1–2.5)
ALP SERPL-CCNC: 71 U/L (ref 36–130)
ALT SERPL-CCNC: 89 U/L (ref 9–46)
AST SERPL-CCNC: 41 U/L (ref 10–40)
BILIRUB SERPL-MCNC: 0.9 MG/DL (ref 0.2–1.2)
BUN SERPL-MCNC: 17 MG/DL (ref 7–25)
BUN/CREAT SERPL: ABNORMAL (CALC) (ref 6–22)
CALCIUM SERPL-MCNC: 9.3 MG/DL (ref 8.6–10.3)
CHLORIDE SERPL-SCNC: 101 MMOL/L (ref 98–110)
CHOLEST SERPL-MCNC: 177 MG/DL
CHOLEST/HDLC SERPL: 4.4 (CALC)
CO2 SERPL-SCNC: 26 MMOL/L (ref 20–32)
CREAT SERPL-MCNC: 1.1 MG/DL (ref 0.6–1.29)
EGFRCR SERPLBLD CKD-EPI 2021: 84 ML/MIN/1.73M2
GLOBULIN SER CALC-MCNC: 2.2 G/DL (CALC) (ref 1.9–3.7)
GLUCOSE SERPL-MCNC: 123 MG/DL (ref 65–99)
HBV CORE AB SERPL QL IA: NORMAL
HBV SURFACE AB SER QL IA: NORMAL
HBV SURFACE AG SERPL QL IA: NORMAL
HDLC SERPL-MCNC: 40 MG/DL
HIV 1+2 AB+HIV1 P24 AG SERPL QL IA: NORMAL
HIV 1+2 AB+HIV1 P24 AG SERPL QL IA: NORMAL
LDLC SERPL CALC-MCNC: 98 MG/DL (CALC)
NONHDLC SERPL-MCNC: 137 MG/DL (CALC)
POTASSIUM SERPL-SCNC: 4 MMOL/L (ref 3.5–5.3)
PROT SERPL-MCNC: 7 G/DL (ref 6.1–8.1)
SODIUM SERPL-SCNC: 137 MMOL/L (ref 135–146)
TRIGL SERPL-MCNC: 294 MG/DL

## 2025-07-02 RX ORDER — ALBUTEROL SULFATE 90 UG/1
2 INHALANT RESPIRATORY (INHALATION) EVERY 6 HOURS PRN
Qty: 6.7 G | Refills: 1 | Status: SHIPPED | OUTPATIENT
Start: 2025-07-02 | End: 2025-07-02 | Stop reason: SDUPTHER

## 2025-07-02 RX ORDER — ALBUTEROL SULFATE 90 UG/1
2 INHALANT RESPIRATORY (INHALATION) EVERY 6 HOURS PRN
Qty: 1 EACH | Refills: 1 | Status: SHIPPED | OUTPATIENT
Start: 2025-07-02

## 2025-07-02 NOTE — TELEPHONE ENCOUNTER
New Patient Appointment Request    Name of caller: Andrew    Reason for Visit: cardiac eval, abn calcium score    Insurance: [employer]        Recent Cardiac Test/Procedures: abn calcium score 9/16/2024        Referred by: DR Marion Beltrán      Primary Care Physician: DR Marion Beltrán      Previous Cardiologist name and phone number: no    Best contact number: [phone]     Additional notes/History: Appnt 10/2. Please call if something opens sooner. ty   [email or web address]   Copyright   Copyright © 2024 UpToDate, Inc. and its affiliates and/or licensors. All rights reserved.  At 9 years old, children who have outgrown the booster seat may use the adult safety belt fastened correctly.   If you have an active [facility] account, please look for your well child questionnaire to come to your [facility] account before your next well child visit.

## 2025-07-03 ENCOUNTER — OFFICE VISIT (OUTPATIENT)
Dept: FAMILY MEDICINE | Facility: CLINIC | Age: 45
End: 2025-07-03
Payer: COMMERCIAL

## 2025-07-03 VITALS
DIASTOLIC BLOOD PRESSURE: 76 MMHG | WEIGHT: 266 LBS | SYSTOLIC BLOOD PRESSURE: 126 MMHG | BODY MASS INDEX: 36.03 KG/M2 | OXYGEN SATURATION: 97 % | RESPIRATION RATE: 16 BRPM | HEIGHT: 72 IN | TEMPERATURE: 98 F | HEART RATE: 91 BPM

## 2025-07-03 DIAGNOSIS — J45.30 MILD PERSISTENT ASTHMATIC BRONCHITIS WITHOUT COMPLICATION: ICD-10-CM

## 2025-07-03 DIAGNOSIS — K76.0 METABOLIC DYSFUNCTION-ASSOCIATED STEATOTIC LIVER DISEASE (MASLD): ICD-10-CM

## 2025-07-03 DIAGNOSIS — R73.01 IFG (IMPAIRED FASTING GLUCOSE): ICD-10-CM

## 2025-07-03 DIAGNOSIS — E78.2 MIXED HYPERLIPIDEMIA: Primary | ICD-10-CM

## 2025-07-03 DIAGNOSIS — I10 ESSENTIAL HYPERTENSION: ICD-10-CM

## 2025-07-03 PROBLEM — L91.0 HYPERTROPHIC SCAR OF SKIN: Status: RESOLVED | Noted: 2021-09-15 | Resolved: 2025-07-03

## 2025-07-03 PROBLEM — L91.8 SKIN TAG: Status: RESOLVED | Noted: 2019-05-28 | Resolved: 2025-07-03

## 2025-07-03 PROBLEM — M75.120 FULL THICKNESS ROTATOR CUFF TEAR: Status: RESOLVED | Noted: 2025-02-24 | Resolved: 2025-07-03

## 2025-07-03 PROCEDURE — 3008F BODY MASS INDEX DOCD: CPT | Performed by: STUDENT IN AN ORGANIZED HEALTH CARE EDUCATION/TRAINING PROGRAM

## 2025-07-03 PROCEDURE — 99214 OFFICE O/P EST MOD 30 MIN: CPT | Mod: 25 | Performed by: STUDENT IN AN ORGANIZED HEALTH CARE EDUCATION/TRAINING PROGRAM

## 2025-07-03 PROCEDURE — 3078F DIAST BP <80 MM HG: CPT | Performed by: STUDENT IN AN ORGANIZED HEALTH CARE EDUCATION/TRAINING PROGRAM

## 2025-07-03 PROCEDURE — 3074F SYST BP LT 130 MM HG: CPT | Performed by: STUDENT IN AN ORGANIZED HEALTH CARE EDUCATION/TRAINING PROGRAM

## 2025-07-03 PROCEDURE — 90471 IMMUNIZATION ADMIN: CPT | Performed by: STUDENT IN AN ORGANIZED HEALTH CARE EDUCATION/TRAINING PROGRAM

## 2025-07-03 PROCEDURE — 90739 HEPB VACC 2/4 DOSE ADULT IM: CPT | Performed by: STUDENT IN AN ORGANIZED HEALTH CARE EDUCATION/TRAINING PROGRAM

## 2025-07-03 RX ORDER — ATORVASTATIN CALCIUM 40 MG/1
40 TABLET, FILM COATED ORAL DAILY
Qty: 90 TABLET | Refills: 1 | Status: SHIPPED | OUTPATIENT
Start: 2025-07-03 | End: 2025-12-30

## 2025-07-03 NOTE — ASSESSMENT & PLAN NOTE
BP is reasonably controlled on olmesartan-HCTZ 40-12.5 mg.  Continue current regimen and encourage lifestyle changes. Would consider adding back amlodipine if BP's trend up. Reevaluate at follow up in 3 months

## 2025-07-03 NOTE — PROGRESS NOTES
Marion Beltrán D.O.  [facility]  [address] [facility]  [address]  [place] [address]  [phone]      HISTORY OF PRESENT ILLNESS                Consent obtained from patient and all parties present in the room? yes    I have obtained the consent of everyone present in the room to make an audio recording of this visit to assist me in documenting the encounter in the EMR.     Chief Complaint   Patient presents with    Blood Pressure Check        Andrew Thrasher is a 45 y.o. male who presents to discuss the following.    History of Present Illness  The patient presents for a follow-up of his blood pressure and to review his labs.    Labs reviewed    AST 41, ALT 89  LDL 98,   Hep B sAb NR    # HTN  On olmesartan-hctz 40-12.5 mg  He monitors his blood pressure at home, typically around 128/80. Diastolic values range from 76 to 78. He is on olmesartan HCTZ.    # CAD/HLD  CT calcium 9/2024 found to be 442.   Statin 20 mg started 11/21/24  LDL is 98, improved but above the target of <70 due to elevated calcium score.  He reports recent late-night snacking but has since stopped and is returning to a healthier lifestyle.   Has appt with Dr. Sterling scheduled in 3 months    # Elevated liver enzymes, elevated FBS  Liver enzymes have increased: ALT 89 (previously 46) and AST 41 (previously 24), possibly due to insulin resistance and weight gain.   Gained 13 lb since last visit  Has known history OF MASLD    Agreeable to hep B shot    Current Outpatient Medications on File Prior to Visit   Medication Sig    albuterol HFA 90 mcg/actuation inhaler Inhale 2 puffs every 6 (six) hours as needed for wheezing.    fluticasone propion-salmeteroL (ADVAIR DISKUS) 250-50 mcg/dose diskus inhaler Inhale 1 puff 2 (two) times a day.    montelukast (SINGULAIR) 10 mg tablet TAKE 1 TABLET BY MOUTH NIGHTLY    olmesartan-hydrochlorothiazide (BENICAR HCT) 40-12.5 mg per tablet Take 1 tablet by mouth daily.     sildenafiL (VIAGRA) 100 mg tablet TAKE 1 TABLET BY MOUTH EVERY DAY AS NEEDED     No current facility-administered medications on file prior to visit.      ROS  All systems reviewed and negative except as otherwise stated in HPI.    PHYSICAL EXAMINATION    Visit Vitals  /76   Pulse 91   Temp 36.7 °C (98 °F) (Temporal)   Resp 16   Ht 1.829 m (6')   Wt 121 kg (266 lb)   SpO2 97%   BMI 36.08 kg/m²      Body mass index is 36.08 kg/m².     Wt Readings from Last 3 Encounters:   07/03/25 121 kg (266 lb)   11/21/24 115 kg (253 lb)   05/20/24 114 kg (251 lb)      BMI Readings from Last 3 Encounters:   07/03/25 36.08 kg/m²   11/21/24 34.31 kg/m²   05/20/24 34.04 kg/m²      Physical Exam    Physical Exam  Vitals reviewed.   Constitutional:       General: He is not in acute distress.     Appearance: Normal appearance. He is not ill-appearing or toxic-appearing.   Cardiovascular:      Rate and Rhythm: Normal rate and regular rhythm.      Heart sounds: No murmur heard.  Pulmonary:      Effort: Pulmonary effort is normal. No respiratory distress.      Breath sounds: No stridor. No wheezing, rhonchi or rales.   Abdominal:      General: There is no distension.      Palpations: Abdomen is soft. There is no mass.      Tenderness: There is no abdominal tenderness. There is no guarding or rebound.      Hernia: No hernia is present.   Musculoskeletal:      Right lower leg: No edema.      Left lower leg: No edema.   Neurological:      Mental Status: He is alert.       Results  Labs   - Fasting blood sugar: 123 mg/dL   - ALT: 89 U/L, previously 46 U/L   - AST: 41 U/L, previously 24 U/L   - LDL cholesterol: 98 mg/dL    ASSESSMENT AND PLAN  Assessment & Plan    Diagnoses and all orders for this visit:    Mixed hyperlipidemia (Primary)  Assessment & Plan:  - LDL 98, target <70 due to elevated calcium score  - Increase atorvastatin from 20 mg to 40 mg  - Repeat CMP in 1 month to monitor liver enzymes  - Repeat CMP and lipid panel in 3  months  - Has appt with Dr. Sterling scheduled in 3 months      Orders:  -     Comprehensive metabolic panel; Future  -     Comprehensive metabolic panel; Future  -     Lipid panel; Future  -     atorvastatin (LIPITOR) 40 mg tablet; Take 1 tablet (40 mg total) by mouth daily.    Essential hypertension  Assessment & Plan:  BP is reasonably controlled on olmesartan-HCTZ 40-12.5 mg.  Continue current regimen and encourage lifestyle changes. Would consider adding back amlodipine if BP's trend up. Reevaluate at follow up in 3 months      IFG (impaired fasting glucose)  Assessment & Plan:  - Fasting blood sugar 123  - Return to healthy diet, exercise  - A1c in 3 months    Orders:  -     Hemoglobin A1c; Future    Metabolic dysfunction-associated steatotic liver disease (MASLD)  Assessment & Plan:  - ALT 89, AST 41, increased almost certainly due to weight gain  - Encourage weight loss, dietary changes, exercise  - Repeat labs in a month to monitor liver enzymes after increasing statin  - Repeat labs in 3 months  - Consider ordering elastography at f/u    Orders:  -     CBC and Differential; Future    Mild persistent asthmatic bronchitis without complication  Assessment & Plan:  Stable.  Continue current regimen      Other orders  -     Hepatitis B vaccine - Adjuvant CPG (HEPSILAV) IM      Current Outpatient Medications:     albuterol HFA 90 mcg/actuation inhaler, Inhale 2 puffs every 6 (six) hours as needed for wheezing., Disp: 1 each, Rfl: 1    atorvastatin (LIPITOR) 40 mg tablet, Take 1 tablet (40 mg total) by mouth daily., Disp: 90 tablet, Rfl: 1    fluticasone propion-salmeteroL (ADVAIR DISKUS) 250-50 mcg/dose diskus inhaler, Inhale 1 puff 2 (two) times a day., Disp: 180 each, Rfl: 1    montelukast (SINGULAIR) 10 mg tablet, TAKE 1 TABLET BY MOUTH NIGHTLY, Disp: 90 tablet, Rfl: 1    olmesartan-hydrochlorothiazide (BENICAR HCT) 40-12.5 mg per tablet, Take 1 tablet by mouth daily., Disp: 90 tablet, Rfl: 1    sildenafiL  (VIAGRA) 100 mg tablet, TAKE 1 TABLET BY MOUTH EVERY DAY AS NEEDED, Disp: 10 tablet, Rfl: 3     Return in about 3 months (around 10/3/2025) for Review labs and BP follow up.     Marion Beltrán, DO  7/3/2025    Attestation

## 2025-07-03 NOTE — ASSESSMENT & PLAN NOTE
- LDL 98, target <70 due to elevated calcium score  - Increase atorvastatin from 20 mg to 40 mg  - Repeat CMP in 1 month to monitor liver enzymes  - Repeat CMP and lipid panel in 3 months  - Has appt with Dr. Sterling scheduled in 3 months

## 2025-07-03 NOTE — ASSESSMENT & PLAN NOTE
- ALT 89, AST 41, increased almost certainly due to weight gain  - Encourage weight loss, dietary changes, exercise  - Repeat labs in a month to monitor liver enzymes after increasing statin  - Repeat labs in 3 months  - Consider ordering elastography at f/u

## 2025-08-12 ENCOUNTER — DOCUMENTATION (OUTPATIENT)
Dept: SURGERY | Facility: CLINIC | Age: 45
End: 2025-08-12
Payer: COMMERCIAL

## 2025-08-12 DIAGNOSIS — Z12.11 ENCOUNTER FOR SCREENING COLONOSCOPY: Primary | ICD-10-CM

## 2025-08-13 RX ORDER — SILDENAFIL 100 MG/1
100 TABLET, FILM COATED ORAL DAILY PRN
Qty: 10 TABLET | Refills: 3 | Status: SHIPPED | OUTPATIENT
Start: 2025-08-13

## 2025-08-19 DIAGNOSIS — I10 ESSENTIAL HYPERTENSION: ICD-10-CM

## 2025-08-19 DIAGNOSIS — J45.30 MILD PERSISTENT ASTHMATIC BRONCHITIS WITHOUT COMPLICATION: ICD-10-CM

## 2025-08-19 RX ORDER — FLUTICASONE PROPIONATE AND SALMETEROL 250; 50 UG/1; UG/1
POWDER RESPIRATORY (INHALATION)
Qty: 180 EACH | Refills: 1 | Status: SHIPPED | OUTPATIENT
Start: 2025-08-19

## 2025-08-19 RX ORDER — OLMESARTAN MEDOXOMIL AND HYDROCHLOROTHIAZIDE 40/12.5 40; 12.5 MG/1; MG/1
1 TABLET ORAL DAILY
Qty: 90 TABLET | Refills: 1 | Status: SHIPPED | OUTPATIENT
Start: 2025-08-19

## 2025-08-25 DIAGNOSIS — J45.30 MILD PERSISTENT ASTHMATIC BRONCHITIS WITHOUT COMPLICATION: ICD-10-CM

## 2025-08-25 RX ORDER — ALBUTEROL SULFATE 90 UG/1
2 INHALANT RESPIRATORY (INHALATION) EVERY 6 HOURS PRN
Qty: 6.7 G | Refills: 1 | Status: SHIPPED | OUTPATIENT
Start: 2025-08-25

## 2025-08-26 ENCOUNTER — OFFICE VISIT (OUTPATIENT)
Dept: CARDIOLOGY | Facility: CLINIC | Age: 45
End: 2025-08-26
Payer: COMMERCIAL

## 2025-08-26 VITALS
DIASTOLIC BLOOD PRESSURE: 76 MMHG | OXYGEN SATURATION: 97 % | WEIGHT: 248 LBS | RESPIRATION RATE: 16 BRPM | HEIGHT: 72 IN | BODY MASS INDEX: 33.59 KG/M2 | HEART RATE: 75 BPM | SYSTOLIC BLOOD PRESSURE: 118 MMHG

## 2025-08-26 DIAGNOSIS — I10 ESSENTIAL HYPERTENSION: Primary | ICD-10-CM

## 2025-08-26 DIAGNOSIS — E78.2 MIXED HYPERLIPIDEMIA: ICD-10-CM

## 2025-08-26 DIAGNOSIS — K76.0 METABOLIC DYSFUNCTION-ASSOCIATED STEATOTIC LIVER DISEASE (MASLD): ICD-10-CM

## 2025-08-26 DIAGNOSIS — R93.1 AGATSTON CAC SCORE, >400: ICD-10-CM

## 2025-08-26 DIAGNOSIS — Z82.49 FAMILY HISTORY OF EARLY CAD: ICD-10-CM

## 2025-08-26 DIAGNOSIS — R06.09 DOE (DYSPNEA ON EXERTION): ICD-10-CM

## 2025-08-26 PROBLEM — Z12.11 ENCOUNTER FOR SCREENING COLONOSCOPY: Status: RESOLVED | Noted: 2025-08-12 | Resolved: 2025-08-26

## 2025-08-26 LAB
ATRIAL RATE: 81
P AXIS: 56
PR INTERVAL: 114
QRS DURATION: 112
QT INTERVAL: 396
QTC CALCULATION(BAZETT): 460
R AXIS: 35
T WAVE AXIS: 21
VENTRICULAR RATE: 81

## 2025-08-26 PROCEDURE — 93000 ELECTROCARDIOGRAM COMPLETE: CPT | Performed by: SPECIALIST

## 2025-08-26 PROCEDURE — 3078F DIAST BP <80 MM HG: CPT | Performed by: SPECIALIST

## 2025-08-26 PROCEDURE — 99204 OFFICE O/P NEW MOD 45 MIN: CPT | Performed by: SPECIALIST

## 2025-08-26 PROCEDURE — 3008F BODY MASS INDEX DOCD: CPT | Performed by: SPECIALIST

## 2025-08-26 PROCEDURE — 3074F SYST BP LT 130 MM HG: CPT | Performed by: SPECIALIST

## 2025-08-26 RX ORDER — ASPIRIN 81 MG/1
81 TABLET ORAL DAILY
Qty: 90 TABLET | Refills: 3 | Status: SHIPPED | OUTPATIENT
Start: 2025-08-26 | End: 2026-02-22

## 2025-08-29 LAB
ALBUMIN SERPL-MCNC: 5 G/DL (ref 3.6–5.1)
ALBUMIN/GLOB SERPL: 2.3 (CALC) (ref 1–2.5)
ALP SERPL-CCNC: 71 U/L (ref 36–130)
ALT SERPL-CCNC: 118 U/L (ref 9–46)
AST SERPL-CCNC: 48 U/L (ref 10–40)
BILIRUB SERPL-MCNC: 0.8 MG/DL (ref 0.2–1.2)
BUN SERPL-MCNC: 27 MG/DL (ref 7–25)
BUN/CREAT SERPL: 25 (CALC) (ref 6–22)
CALCIUM SERPL-MCNC: 9.5 MG/DL (ref 8.6–10.3)
CHLORIDE SERPL-SCNC: 105 MMOL/L (ref 98–110)
CO2 SERPL-SCNC: 26 MMOL/L (ref 20–32)
CREAT SERPL-MCNC: 1.1 MG/DL (ref 0.6–1.29)
EGFRCR SERPLBLD CKD-EPI 2021: 84 ML/MIN/1.73M2
GLOBULIN SER CALC-MCNC: 2.2 G/DL (CALC) (ref 1.9–3.7)
GLUCOSE SERPL-MCNC: 107 MG/DL (ref 65–139)
POTASSIUM SERPL-SCNC: 4.3 MMOL/L (ref 3.5–5.3)
PROT SERPL-MCNC: 7.2 G/DL (ref 6.1–8.1)
SODIUM SERPL-SCNC: 137 MMOL/L (ref 135–146)

## (undated) DEVICE — SPONGE DRESSING DRAIN STERILE EXCILON 2S

## (undated) DEVICE — DRESSING SPONGE ALL GAUZE 4X4 STER 10PK

## (undated) DEVICE — PAD GROUND ELECTROSURGICAL W/CORD

## (undated) DEVICE — BLADE BONE MILL DISP MEDIUM

## (undated) DEVICE — DRESSING AQUACEL AG 6IN

## (undated) DEVICE — Device

## (undated) DEVICE — DRAIN SINGLE ROUND W/TROCAR 3/16

## (undated) DEVICE — MANIFOLD FOUR PORT NEPTUNE

## (undated) DEVICE — GAUZE XEROFORM 1X8 NON-STERILE PACKET

## (undated) DEVICE — SURGIFLO HEMOSTATIC MATRIX 8ML

## (undated) DEVICE — GLOVE SZ 7.5 LINER PROTEXIS PI BL

## (undated) DEVICE — TRAY URINE METER FOLEY SILVER 16 FR 5CC 2 W

## (undated) DEVICE — SUTURE VICRYL PLUS 2-0 VCP869H

## (undated) DEVICE — PACK OR TOWEL

## (undated) DEVICE — GLOVE PROTEXIS PI ORTHO 7.5

## (undated) DEVICE — *T* 4.0MM PRECISION ROUND BUR

## (undated) DEVICE — LABEL MEDICATION NEUO ORTHO

## (undated) DEVICE — SYRINGE DISP LUER-LOK  3 CC

## (undated) DEVICE — SOLN DURAPREP WAND

## (undated) DEVICE — PACK BASIC I

## (undated) DEVICE — SUTURE VICRYL 1 J196H CP UNDYED 27IN

## (undated) DEVICE — COVER LIGHTHANDLE (STERILE SINGLE PA

## (undated) DEVICE — ***USE 138743*** SUTURE ETHILON 3-0 ON PSL 30IN

## (undated) DEVICE — EVACUATOR RELIAVAC STRL MD 100CC

## (undated) DEVICE — ***USE 121496***PACK SPINE LUMBAR RMH

## (undated) DEVICE — SUTURE ETHILON  2-0   664H